# Patient Record
Sex: MALE | Race: WHITE | Employment: OTHER | URBAN - METROPOLITAN AREA
[De-identification: names, ages, dates, MRNs, and addresses within clinical notes are randomized per-mention and may not be internally consistent; named-entity substitution may affect disease eponyms.]

---

## 2017-08-10 ENCOUNTER — OP HISTORICAL/CONVERTED ENCOUNTER (OUTPATIENT)
Dept: OTHER | Age: 67
End: 2017-08-10

## 2018-05-29 ENCOUNTER — ED HISTORICAL/CONVERTED ENCOUNTER (OUTPATIENT)
Dept: OTHER | Age: 68
End: 2018-05-29

## 2018-08-16 ENCOUNTER — OFFICE VISIT (OUTPATIENT)
Dept: ENDOCRINOLOGY | Age: 68
End: 2018-08-16

## 2018-08-16 VITALS
TEMPERATURE: 98.7 F | OXYGEN SATURATION: 95 % | SYSTOLIC BLOOD PRESSURE: 139 MMHG | HEIGHT: 68 IN | HEART RATE: 56 BPM | BODY MASS INDEX: 34.57 KG/M2 | DIASTOLIC BLOOD PRESSURE: 69 MMHG | WEIGHT: 228.1 LBS | RESPIRATION RATE: 16 BRPM

## 2018-08-16 DIAGNOSIS — D35.02 ADRENAL ADENOMA, LEFT: Primary | ICD-10-CM

## 2018-08-16 RX ORDER — POLYETHYLENE GLYCOL 3350 17 G/17G
34 POWDER, FOR SOLUTION ORAL DAILY
COMMUNITY
End: 2020-06-20

## 2018-08-16 RX ORDER — FLUOXETINE HYDROCHLORIDE 40 MG/1
40 CAPSULE ORAL DAILY
COMMUNITY

## 2018-08-16 RX ORDER — PANTOPRAZOLE SODIUM 40 MG/1
40 TABLET, DELAYED RELEASE ORAL DAILY
COMMUNITY

## 2018-08-16 RX ORDER — BISMUTH SUBSALICYLATE 262 MG
1 TABLET,CHEWABLE ORAL DAILY
Status: ON HOLD | COMMUNITY
End: 2020-10-23

## 2018-08-16 RX ORDER — TRAZODONE HYDROCHLORIDE 100 MG/1
200 TABLET ORAL
COMMUNITY

## 2018-08-16 NOTE — PROGRESS NOTES
[facility]               Adam Crespo MD        [address] 78 444 81 66 Fax [phone]       Patient Information  Date:8/18/2018  Name : Lorraine Alvarez 77 y.o.     YOB: 1950         Referred by: Meggan Kelley DO       Chief Complaint   Patient presents with   Klondike Shaker New Patient     referred by Dr. Umair Garza for Adrenal problem       History of Present Illness: Lorraine Alvarez is a 77 y.o. male   He was referred for evaluation of left adrenal nodule with was found incidentally on CT scan. He also was found to have aortic aneurysm. He has hypertension which is controlled on medications, sleep apnea and uses CPAP machine  No hypokalemia, has palpitations intermittently but no spells of severe hypertension, chest pain, sweating and headaches. Has difficulty losing weight, gradually been gaining weight. Reports low libido    Complains of joint pain, sciatica, no chest pain, shortness of breath, blurred vision    Past Medical History:   Diagnosis Date    CAD (coronary artery disease)     COPD (chronic obstructive pulmonary disease) (HCC)     Hypertension     Lung nodule     DIONISIO (obstructive sleep apnea)     Sciatica      Past Surgical History:   Procedure Laterality Date    HX ANGIOPLASTY      HX CHOLECYSTECTOMY      HX COLONOSCOPY  02/2016    HX REFRACTIVE SURGERY  12/2016     Current Outpatient Prescriptions   Medication Sig    lisinopril (PRINIVIL, ZESTRIL) 40 mg tablet TAKE 1 TABLET BY MOUTH EVERY DAY    polyethylene glycol (MIRALAX) 17 gram packet Take 34 g by mouth daily.  FLUoxetine (PROZAC) 40 mg capsule Take  by mouth daily.  pantoprazole (PROTONIX) 40 mg tablet Take 40 mg by mouth daily.  traZODone (DESYREL) 100 mg tablet Take 100 mg by mouth nightly.  vitamin e (E GEMS) 1,000 unit capsule Take 1,000 Units by mouth daily.  multivitamin (ONE A DAY) tablet Take 1 Tab by mouth daily.     spironolactone (ALDACTONE) 25 mg tablet Take 1 Tab by mouth two (2) times a day.  pravastatin (PRAVACHOL) 40 mg tablet TAKE 1 TABLET BY MOUTH EVERY DAY    omeprazole (PRILOSEC) 10 mg capsule TAKE 2 CAPSULES BY MOUTH TWICE DAILY    omega-3 fatty acids-vitamin e (FISH OIL) 1,000 mg Cap Take 2 Caps by mouth two (2) times a day.  ASPIRIN CHILDRENS PO Take  by mouth.  temazepam (RESTORIL) 30 mg capsule Take 1 Cap by mouth nightly. No current facility-administered medications for this visit. Allergies   Allergen Reactions    Hydrochlorothiazide Other (comments)     kidney problem    Zestril [Lisinopril] Cough         Review of Systems:  All 10 systems reviewed and are negative other than mentioned in HPI    Physical Examination:  Blood pressure 139/69, pulse (!) 56, temperature 98.7 °F (37.1 °C), temperature source Oral, resp. rate 16, height 5' 8.25\" (1.734 m), weight 228 lb 1.6 oz (103.5 kg), SpO2 95 %. Body mass index is 34.43 kg/(m^2). - General: pleasant, no distress, good eye contact  - HEENT: no exopthalmos, no periorbital edema, no scleral/conjunctival injection, EOMI, no lid lag or stare  - Neck: supple, no thyromegaly, lymph nodes, or carotid bruits, no supraclavicular or dorsocervical fat pads  - Cardiovascular: regular, normal rate, normal S1 and S2, no murmurs  - Respiratory: clear to auscultation bilaterally  - Gastrointestinal: soft, nontender, nondistended,BS +  - Musculoskeletal: no proximal muscle weakness in upper or lower extremities  - Integumentary: no acanthosis nigricans, no abnormal abdominal striae, no rashes, no edema  - Neurological: Alert and oriented, no tremor  - Psychiatric: normal mood and affect    Data Reviewed:     Assessment/Plan:     1. Adrenal adenoma, left        Left adrenal adenoma incidentally found on CT scan.   3 cm per PCPs notes  Could not get the CT report     We will screen for 3 potential endocrine conditions that occur due to excess hormone production from the adrenal gland: hyperaldosteronism, Cushings syndrome, and pheochromocytoma. He is off spironolactone for the last 4 weeks    - will need a repeat CT scan in 6-12 months to document stability in size of adenoma, if no change at that time, no need for further repeat imaging. Excess hormone secretion may develop in 20% of patients with previously documented nonfunctional adrenal tumors  during follow up. Annual biochemical evaluation for hormone hypersecretion for upto to 5 yrs is recommended especially if the tumor size is > 3 cm. A routine follow up imaging is not required if non contrast CT attenuation value is < 10 HU,although a one time follow up scan in 6 - 12 month is reassuring. If the size is < 4 cm and the noncontrast attenuation is > 10 HU a repeat CT study in 3-6 months and then yearly for 2 years is recommended. There is no good evidence supporting continued radiological survelliance. About 25% - 30% of adenomas are lipid poor and have attenuation values > 10 HU. In that case contrast wash out can be used to evaluate the nature of the adrenal mass. Thank you for allowing me to participate in the care of this patient. Janna Simpson MD      Patient Instructions   Instructions for salivary cortisol test    1. Do not brush teeth or floss  before collecting specimen. 2. Do not eat or drink for 30 minutes prior to specimen collection. 3. 24 hours before collection - do not use any creams or lotion that contains steroids such as         hydrocortisone or use any steroid inhalers. These products may contaminate the absorbent. 4. Avoid activities that may cause gum bleeding before collection      Night 1- Put the absorbent pad under your tongue between 11p-midnight for four (4) minutes, label with name, date of birth, collection date and collection time.  Place pad in the freezer.     Night 5- Put the absorbent pad under your tongue between 11p-midnight for four (4) minutes, label with name, date of birth, collection date and collection time. Place pad in the freezer.      Absorbent pads are to remain frozen until you [facility] orders or our lab in the office. Follow-up Disposition:  Return in about 9 months (around 5/16/2019). Patient /caregiver verbalized understanding . Voice-recognition software was used to generate this report, which may result in some phonetic-based errors in the grammar and contents. Even though attempts were made to correct all the mistakes, some may have been missed and remained in the body of the report.

## 2018-08-16 NOTE — MR AVS SNAPSHOT
[address] [address] 
[phone] Patient: Heri Mckay MRN:  [id number] Visit Information Date & Time Provider Department Dept. Phone Encounter #  
 8/16/2018 11:00 AM Nilo Carcamo MD [facility] Diabetes & Endocrinology [phone] [id number] Follow-up Instructions Return in about 9 months (around 5/16/2019). Upcoming Health Maintenance Date Due Hepatitis C Screening 1950 DTaP/Tdap/Td series (1 - Tdap) 6/1/1969 ZOSTER VACCINE AGE 60> 4/1/2008 FOBT Q 1 YEAR AGE 50-75 1/10/2012 GLAUCOMA SCREENING Q2Y 6/1/2013 Pneumococcal 65+ Low/Medium Risk (1 of 2 - PCV13) 6/1/2013 Influenza Age 5 to Adult 8/1/2018 Allergies as of 8/16/2018  Review Complete On: 8/16/2018 By: Nilo Carcamo MD  
  
 Severity Noted Reaction Type Reactions Hydrochlorothiazide  08/09/2010    Other (comments)  
 kidney problem Zestril [Lisinopril]  08/09/2010    Cough Current Immunizations  Never Reviewed No immunizations on file. Not reviewed this visit You Were Diagnosed With   
  
 Codes Comments Adrenal adenoma, left    -  Primary ICD-10-CM: D35.02 
ICD-9-CM: 227.0 Vitals BP Pulse Temp Resp Height(growth percentile) Weight(growth percentile) 139/69 (BP 1 Location: Left arm, BP Patient Position: Sitting) (!) 56 98.7 °F (37.1 °C) (Oral) 16 5' 8.25\" (1.734 m) 228 lb 1.6 oz (103.5 kg) SpO2 BMI Smoking Status 95% 34.43 kg/m2 Current Every Day Smoker Vitals History BMI and BSA Data Body Mass Index Body Surface Area 34.43 kg/m 2 2.23 m 2 Preferred Pharmacy Pharmacy Name Phone [address] [facility] [address] Your Updated Medication List  
  
   
This list is accurate as of 8/16/18 12:00 PM.  Always use your most recent med list.  
  
  
  
  
 ASPIRIN CHILDRENS PO Take  by mouth. FISH OIL 1,000 mg Cap Generic drug:  omega-3 fatty acids-vitamin e Take 2 Caps by mouth two (2) times a day. FLUoxetine 40 mg capsule Commonly known as:  PROzac Take  by mouth daily. MIRALAX 17 gram packet Generic drug:  polyethylene glycol Take 34 g by mouth daily. multivitamin tablet Commonly known as:  ONE A DAY Take 1 Tab by mouth daily. omeprazole 10 mg capsule Commonly known as:  PRILOSEC  
TAKE 2 CAPSULES BY MOUTH TWICE DAILY pantoprazole 40 mg tablet Commonly known as:  PROTONIX Take 40 mg by mouth daily. pravastatin 40 mg tablet Commonly known as:  PRAVACHOL  
TAKE 1 TABLET BY MOUTH EVERY DAY  
  
 spironolactone 25 mg tablet Commonly known as:  ALDACTONE Take 1 Tab by mouth two (2) times a day. temazepam 30 mg capsule Commonly known as:  RESTORIL Take 1 Cap by mouth nightly. traZODone 100 mg tablet Commonly known as:  Chema Awkward Take 100 mg by mouth nightly. vitamin e 1,000 unit capsule Commonly known as:  E GEMS Take 1,000 Units by mouth daily. Follow-up Instructions Return in about 9 months (around 5/16/2019). Patient Instructions Instructions for salivary cortisol test 
 
1. Do not brush teeth or floss  before collecting specimen. 2. Do not eat or drink for 30 minutes prior to specimen collection. 3. 24 hours before collection - do not use any creams or lotion that contains steroids such as         hydrocortisone or use any steroid inhalers. These products may contaminate the absorbent. 4. Avoid activities that may cause gum bleeding before collection Night 1- Put the absorbent pad under your tongue between 11p-midnight for four (4) minutes, label with name, date of birth, collection date and collection time.  Place pad in the freezer. 
  
Night 5- Put the absorbent pad under your tongue between 11p-midnight for four (4) minutes, label with name, date of birth, collection date and collection time. Place pad in the freezer.  
  
Absorbent pads are to remain frozen until you can drop them off to [facility] with orders or our lab in the office. Introducing [facility]! [facility] introduces [employer] patient portal. Now you can access parts of your medical record, email your doctor's office, and request medication refills online. 1. In your internet browser, go to [email or web address] 2. Click on the First Time User? Click Here link in the Sign In box. You will see the New Member Sign Up page. 3. Enter your [employer] Access Code exactly as it appears below. You will not need to use this code after youve completed the sign-up process. If you do not sign up before the expiration date, you must request a new code. · [employer] Access Code: [email or web address] Expires: 11/14/2018 12:00 PM 
 
4. Enter the last four digits of your Social Security Number (xxxx) and Date of Birth (mm/dd/yyyy) as indicated and click Submit. You will be taken to the next sign-up page. 5. Create a [employer] ID. This will be your [employer] login ID and cannot be changed, so think of one that is secure and easy to remember. 6. Create a [employer] password. You can change your password at any time. 7. Enter your Password Reset Question and Answer. This can be used at a later time if you forget your password. 8. Enter your e-mail address. You will receive e-mail notification when new information is available in [address] 9. Click Sign Up. You can now view and download portions of your medical record. 10. Click the Download Summary menu link to download a portable copy of your medical information. If you have questions, please visit the Frequently Asked Questions section of the [employer] website. Remember, [employer] is NOT to be used for urgent needs. For medical emergencies, dial 911. Now available from your iPhone and Android! Please provide this summary of care documentation to your next provider. Your primary care clinician is listed as Jakob Alfonso. If you have any questions after today's visit, please call [phone].

## 2018-08-16 NOTE — PATIENT INSTRUCTIONS
Instructions for salivary cortisol test    1. Do not brush teeth or floss  before collecting specimen. 2. Do not eat or drink for 30 minutes prior to specimen collection. 3. 24 hours before collection - do not use any creams or lotion that contains steroids such as         hydrocortisone or use any steroid inhalers. These products may contaminate the absorbent. 4. Avoid activities that may cause gum bleeding before collection      Night 1- Put the absorbent pad under your tongue between 11p-midnight for four (4) minutes, label with name, date of birth, collection date and collection time. Place pad in the freezer.     Night 5- Put the absorbent pad under your tongue between 11p-midnight for four (4) minutes, label with name, date of birth, collection date and collection time. Place pad in the freezer.      Absorbent pads are to remain frozen until you can drop them off to [facility] with orders or our lab in the office.

## 2018-08-16 NOTE — LETTER
8/18/2018 5:47 PM 
 
Patient:  Brooke Loza YOB: 1950 Date of Visit: 8/16/2018 Dear Michael Chaparro, [address] [place], [place] [address]Katalina Curiel [address] [address] VIA Facsimile: [phone] 
 : Thank you for referring Mr. Hiral Ritchie to me for evaluation/treatment. Below are the relevant portions of my assessment and plan of care. If you have questions, please do not hesitate to call me. I look forward to following Mr. Chrissie Kang along with you. Sincerely, Denise Raza MD

## 2018-08-18 RX ORDER — LISINOPRIL 40 MG/1
TABLET ORAL
Qty: 90 TAB | Refills: 3 | COMMUNITY
Start: 2018-08-16

## 2018-08-24 ENCOUNTER — TELEPHONE (OUTPATIENT)
Dept: ENDOCRINOLOGY | Age: 68
End: 2018-08-24

## 2018-08-24 NOTE — TELEPHONE ENCOUNTER
----- Message from Bear Courtney MD sent at 8/23/2018  9:43 PM EDT -----  Tests are all normal except one level which is cortisol  Need late-night salivary cortisol ×2

## 2018-08-24 NOTE — TELEPHONE ENCOUNTER
Per Dr. Otto Matias, informed pt of result note, as noted above. Pt verbalized understanding and stated he has started the salivary cortisol test and will bring it in next week. No further questions or concerns at this time.

## 2018-09-06 ENCOUNTER — OP HISTORICAL/CONVERTED ENCOUNTER (OUTPATIENT)
Dept: OTHER | Age: 68
End: 2018-09-06

## 2018-09-27 ENCOUNTER — OP HISTORICAL/CONVERTED ENCOUNTER (OUTPATIENT)
Dept: OTHER | Age: 68
End: 2018-09-27

## 2019-05-15 ENCOUNTER — OP HISTORICAL/CONVERTED ENCOUNTER (OUTPATIENT)
Dept: OTHER | Age: 69
End: 2019-05-15

## 2019-12-02 ENCOUNTER — ED HISTORICAL/CONVERTED ENCOUNTER (OUTPATIENT)
Dept: OTHER | Age: 69
End: 2019-12-02

## 2020-06-18 ENCOUNTER — OFFICE VISIT (OUTPATIENT)
Dept: ENDOCRINOLOGY | Age: 70
End: 2020-06-18

## 2020-06-18 VITALS
DIASTOLIC BLOOD PRESSURE: 78 MMHG | RESPIRATION RATE: 18 BRPM | HEART RATE: 56 BPM | HEIGHT: 68 IN | SYSTOLIC BLOOD PRESSURE: 159 MMHG | OXYGEN SATURATION: 95 % | TEMPERATURE: 96.9 F | BODY MASS INDEX: 31.37 KG/M2 | WEIGHT: 207 LBS

## 2020-06-18 DIAGNOSIS — I10 ESSENTIAL HYPERTENSION: ICD-10-CM

## 2020-06-18 DIAGNOSIS — G47.33 OSA (OBSTRUCTIVE SLEEP APNEA): Primary | ICD-10-CM

## 2020-06-18 DIAGNOSIS — D35.02 ADRENAL ADENOMA, LEFT: ICD-10-CM

## 2020-06-18 RX ORDER — CLOPIDOGREL BISULFATE 75 MG/1
75 TABLET ORAL DAILY
COMMUNITY

## 2020-06-18 RX ORDER — DILTIAZEM HYDROCHLORIDE 180 MG/1
180 CAPSULE, COATED, EXTENDED RELEASE ORAL DAILY
COMMUNITY

## 2020-06-18 NOTE — PATIENT INSTRUCTIONS
Instructions for salivary cortisol test 
 
1. Do not brush teeth or floss  before collecting specimen. 2. Do not eat or drink for 30 minutes prior to specimen collection. 3. 24 hours before collection - do not use any creams or lotion that contains steroids such as         hydrocortisone or use any steroid inhalers. These products may contaminate the absorbent. 4. Avoid activities that may cause gum bleeding before collection Night 1- Put the absorbent pad under your tongue between 11p-midnight for four (4) minutes, label with name, date of birth, collection date and collection time. Place pad in the freezer. 
  
Night 5- Put the absorbent pad under your tongue between 11p-midnight for four (4) minutes, label with name, date of birth, collection date and collection time. Place pad in the freezer.  
  
Absorbent pads are to remain frozen until you can drop them off to [facility] with orders or our lab in the office.  
 
 
 
 
Blood test in the morning before 9 AM

## 2020-06-18 NOTE — PROGRESS NOTES
Jacob Mays MD      Patient Information  Date:6/18/2020  Name : Oracio Wise 65 y.o.     YOB: 1950         Referred by: Lee Hall DO       Chief Complaint   Patient presents with    Adrenal Problem     LV 2018       History of Present Illness: Oracio Wise is a 65 y.o. male   He was initially referred for evaluation of left adrenal nodule with was found incidentally on CT scan. He was last seen in 2018, had some labs, did not complete the work-up    He has hypertension which is controlled on medications, sleep apnea and uses CPAP machine  No hypokalemia, has palpitations intermittently but no spells of severe hypertension, chest pain, sweating and headaches. Joint pain      No chest pain, shortness of breath, fever, cough    Past Medical History:   Diagnosis Date    CAD (coronary artery disease)     COPD [facility]chronic obstructive pulmonary disease) ([facility] [address]     Hypertension     Lung nodule     DIONISIO (obstructive sleep apnea)     Sciatica      Past Surgical History:   Procedure Laterality Date    HX ANGIOPLASTY      HX BACK SURGERY  02/19/2020    lower back    HX CHOLECYSTECTOMY      HX COLONOSCOPY  02/2016    HX REFRACTIVE SURGERY  12/2016     Current Outpatient Medications   Medication Sig    lisinopril (PRINIVIL, ZESTRIL) 40 mg tablet TAKE 1 TABLET BY MOUTH EVERY DAY    polyethylene glycol (MIRALAX) 17 gram packet Take 34 g by mouth daily.  FLUoxetine (PROZAC) 40 mg capsule Take  by mouth daily.  pantoprazole (PROTONIX) 40 mg tablet Take 40 mg by mouth daily.  traZODone (DESYREL) 100 mg tablet Take 100 mg by mouth nightly.  vitamin e (E GEMS) 1,000 unit capsule Take 1,000 Units by mouth daily.  multivitamin (ONE A DAY) tablet Take 1 Tab by mouth daily.  temazepam (RESTORIL) 30 mg capsule Take 1 Cap by mouth nightly.  spironolactone (ALDACTONE) 25 mg tablet Take 1 Tab by mouth two (2) times a day.     pravastatin (PRAVACHOL) 40 mg tablet TAKE 1 TABLET BY MOUTH EVERY DAY    omeprazole (PRILOSEC) 10 mg capsule TAKE 2 CAPSULES BY MOUTH TWICE DAILY    omega-3 fatty acids-vitamin e (FISH OIL) 1,000 mg Cap Take 2 Caps by mouth two (2) times a day.  ASPIRIN CHILDRENS PO Take  by mouth. No current facility-administered medications for this visit. Allergies   Allergen Reactions    Hydrochlorothiazide Other (comments)     kidney problem    Zestril [Lisinopril] Cough         Review of Systems: Per HPI    Physical Examination:  Blood pressure 173/83, pulse (!) 56, temperature (!) 96.6 °F (35.9 °C), temperature source Oral, resp. rate 18, height 5' 8.25\" (1.734 m), weight 207 lb (93.9 kg), SpO2 95 %. Body mass index is 31.24 kg/m². - General: pleasant, no distress, good eye contact  - HEENT: no exophthalmos, no periorbital edema, EOMI  - Neck: No visible thyromegaly  - RS: Normal respiratory effort  - Musculoskeletal: no tremors  - Neurological: alert and oriented  - Psychiatric: normal mood and affect  - Skin: Normal color  -     Data Reviewed:     Assessment/Plan:     1. Adrenal adenoma, left        Left adrenal adenoma incidentally found on CT scan-3 cm in 2018 2018 plasma metanephrines, aldosterone, renin normal, cortisol was elevated, did not complete late-night salivary cortisol  No excess weight gain    Labs soon  Discussed need for follow-up CT abdomen    Excess hormone secretion may develop in 20% of patients with previously documented nonfunctional adrenal tumors  during follow up. Annual biochemical evaluation for hormone hypersecretion for upto to 5 yrs is recommended especially if the tumor size is > 3 cm. Hypertension: Not at goal        Obstructive sleep apnea: On CPAP          Thank you for allowing me to participate in the care of this patient. Chuckie Gaines MD      There are no Patient Instructions on file for this visit.           Patient /caregiver verbalized understanding [employer] Voice-recognition software was used to generate this report, which may result in some phonetic-based errors in the grammar and contents. Even though attempts were made to correct all the mistakes, some may have been missed and remained in the body of the report.
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
## 2020-06-18 NOTE — LETTER
6/20/20 Patient: Rena Juarez YOB: 1950 Date of Visit: 6/18/2020 Natasha Pedersen, [address] [address] VIA Facsimile: [phone] Dear Natasha Pedersen, DO, Thank you for referring Mr. Aidee Larson to [address] [place] [address] for evaluation. My notes for this consultation are attached. If you have questions, please do not hesitate to call me. I look forward to following your patient along with you. Sincerely, Janell Estes MD

## 2020-06-20 PROBLEM — G47.33 OSA (OBSTRUCTIVE SLEEP APNEA): Status: ACTIVE | Noted: 2020-06-20

## 2020-07-16 NOTE — PROGRESS NOTES
Adrenal hormones are all normal except 1 which is very minimally off which is the saliva test.  1 of the saliva test is normal 1 is abnormal, so we need to do a different kind of blood test to rule out    Take Dexamethasone 1 tablet at 11pm the night before and go to the lab for blood  draw between 7:30-8:30 the next morning. It  is very important for the blood to be drawn before 8:30 AM    A.m. cortisol, dexamethasone levels

## 2020-07-17 ENCOUNTER — TELEPHONE (OUTPATIENT)
Dept: ENDOCRINOLOGY | Age: 70
End: 2020-07-17

## 2020-07-17 DIAGNOSIS — D35.02 ADRENAL ADENOMA, LEFT: Primary | ICD-10-CM

## 2020-07-17 DIAGNOSIS — D35.02 ADRENAL ADENOMA, LEFT: ICD-10-CM

## 2020-07-17 RX ORDER — DEXAMETHASONE 1 MG/1
TABLET ORAL
Qty: 1 TAB | Refills: 0 | Status: ON HOLD | OUTPATIENT
Start: 2020-07-17 | End: 2020-10-23

## 2020-07-17 NOTE — TELEPHONE ENCOUNTER
----- Message from Raimundo Menjivar MD sent at 7/16/2020  3:16 PM EDT -----  Adrenal hormones are all normal except 1 which is very minimally off which is the saliva test.  1 of the saliva test is normal 1 is abnormal, so we need to do a different kind of blood test to rule out    Take Dexamethasone 1 tablet at 11pm the night before and go to the lab for blood  draw between 7:30-8:30 the next morning. It  is very important for the blood to be drawn before 8:30 AM    A.m. cortisol, dexamethasone levels

## 2020-07-17 NOTE — TELEPHONE ENCOUNTER
Per Dr. Geraldine Olsen, informed pt of result note, as noted above. Pt verbalized understanding with no further questions or concerns at this time. Order placed for pt per verbal order with read back from Dr. Geraldine Olsen 07/17/20    Lab slips mailed with instructions.

## 2020-07-30 LAB
CORTIS AM PEAK SERPL-MCNC: 1.6 UG/DL (ref 6.2–19.4)
DEXAMETHASONE SERPL-MCNC: 533 NG/DL

## 2020-08-03 ENCOUNTER — TELEPHONE (OUTPATIENT)
Dept: ENDOCRINOLOGY | Age: 70
End: 2020-08-03

## 2020-08-03 NOTE — TELEPHONE ENCOUNTER
----- Message from Jaquan Kumar MD sent at 7/31/2020  9:20 AM EDT -----  No evidence of excess secretion from the nodule    We monitor next year again

## 2020-08-03 NOTE — TELEPHONE ENCOUNTER
Per Dr. Lori Solano, informed pt of result note, as noted above. Pt verbalized understanding with no further questions or concerns at this time.

## 2020-10-15 ENCOUNTER — HOSPITAL ENCOUNTER (OUTPATIENT)
Dept: GENERAL RADIOLOGY | Age: 70
Discharge: HOME | End: 2020-10-15
Attending: ORTHOPAEDIC SURGERY
Payer: MEDICARE

## 2020-10-15 ENCOUNTER — HOSPITAL ENCOUNTER (OUTPATIENT)
Dept: PREADMISSION TESTING | Age: 70
Discharge: HOME | End: 2020-10-15
Payer: MEDICARE

## 2020-10-15 VITALS
HEIGHT: 67 IN | WEIGHT: 199.08 LBS | RESPIRATION RATE: 16 BRPM | OXYGEN SATURATION: 96 % | DIASTOLIC BLOOD PRESSURE: 77 MMHG | HEART RATE: 51 BPM | BODY MASS INDEX: 31.25 KG/M2 | SYSTOLIC BLOOD PRESSURE: 166 MMHG | TEMPERATURE: 98 F

## 2020-10-15 LAB
ABO + RH BLD: NORMAL
ANION GAP SERPL CALC-SCNC: 2 MMOL/L (ref 5–15)
APPEARANCE UR: ABNORMAL
APTT PPP: 29.3 SEC (ref 23–35.7)
ATRIAL RATE: 54 BPM
BACTERIA URNS QL MICRO: NEGATIVE /HPF
BILIRUB UR QL: NEGATIVE
BLOOD GROUP ANTIBODIES SERPL: NEGATIVE
BUN SERPL-MCNC: 12 MG/DL (ref 6–20)
BUN/CREAT SERPL: 11 (ref 12–20)
CA-I BLD-MCNC: 9.8 MG/DL (ref 8.5–10.1)
CALCULATED P AXIS, ECG09: 77 DEGREES
CALCULATED R AXIS, ECG10: 40 DEGREES
CALCULATED T AXIS, ECG11: 32 DEGREES
CHLORIDE SERPL-SCNC: 105 MMOL/L (ref 97–108)
CO2 SERPL-SCNC: 33 MMOL/L (ref 21–32)
COLOR UR: ABNORMAL
CREAT SERPL-MCNC: 1.06 MG/DL (ref 0.7–1.3)
DIAGNOSIS, 93000: NORMAL
ERYTHROCYTE [DISTWIDTH] IN BLOOD BY AUTOMATED COUNT: 14 % (ref 11.5–14.5)
GLUCOSE SERPL-MCNC: 96 MG/DL (ref 65–100)
GLUCOSE UR STRIP.AUTO-MCNC: NEGATIVE MG/DL
HCT VFR BLD AUTO: 45.4 % (ref 36.6–50.3)
HGB BLD-MCNC: 15.3 G/DL (ref 12.1–17)
HGB UR QL STRIP: NEGATIVE
INR PPP: 0.9 (ref 0.9–1.1)
KETONES UR QL STRIP.AUTO: NEGATIVE MG/DL
LEUKOCYTE ESTERASE UR QL STRIP.AUTO: NEGATIVE
MCH RBC QN AUTO: 34.5 PG (ref 26–34)
MCHC RBC AUTO-ENTMCNC: 33.7 G/DL (ref 30–36.5)
MCV RBC AUTO: 102.3 FL (ref 80–99)
MUCOUS THREADS URNS QL MICRO: ABNORMAL /LPF
NITRITE UR QL STRIP.AUTO: NEGATIVE
P-R INTERVAL, ECG05: 328 MS
PH UR STRIP: 5 [PH] (ref 5–8)
PLATELET # BLD AUTO: 243 K/UL (ref 150–400)
PMV BLD AUTO: 9.9 FL (ref 8.9–12.9)
POTASSIUM SERPL-SCNC: 4 MMOL/L (ref 3.5–5.1)
PROT UR STRIP-MCNC: NEGATIVE MG/DL
PROTHROMBIN TIME: 12.5 SEC (ref 11.9–14.7)
Q-T INTERVAL, ECG07: 446 MS
QRS DURATION, ECG06: 88 MS
QTC CALCULATION (BEZET), ECG08: 422 MS
RBC # BLD AUTO: 4.44 M/UL (ref 4.1–5.7)
RBC #/AREA URNS HPF: ABNORMAL /HPF (ref 0–5)
SODIUM SERPL-SCNC: 140 MMOL/L (ref 136–145)
SP GR UR REFRACTOMETRY: 1.03 (ref 1–1.03)
SPECIMEN EXP DATE BLD: NORMAL
THERAPEUTIC RANGE,PTTT: NORMAL SEC (ref 68–109)
UROBILINOGEN UR QL STRIP.AUTO: 2 EU/DL (ref 0.1–1)
VENTRICULAR RATE, ECG03: 54 BPM
WBC # BLD AUTO: 8.4 K/UL (ref 4.1–11.1)
WBC URNS QL MICRO: ABNORMAL /HPF (ref 0–4)

## 2020-10-15 PROCEDURE — 36415 COLL VENOUS BLD VENIPUNCTURE: CPT

## 2020-10-15 PROCEDURE — 71046 X-RAY EXAM CHEST 2 VIEWS: CPT

## 2020-10-15 PROCEDURE — 85027 COMPLETE CBC AUTOMATED: CPT

## 2020-10-15 PROCEDURE — 93005 ELECTROCARDIOGRAM TRACING: CPT

## 2020-10-15 PROCEDURE — 85610 PROTHROMBIN TIME: CPT

## 2020-10-15 PROCEDURE — 87086 URINE CULTURE/COLONY COUNT: CPT

## 2020-10-15 PROCEDURE — 85730 THROMBOPLASTIN TIME PARTIAL: CPT

## 2020-10-15 PROCEDURE — 81001 URINALYSIS AUTO W/SCOPE: CPT

## 2020-10-15 PROCEDURE — 80048 BASIC METABOLIC PNL TOTAL CA: CPT

## 2020-10-15 PROCEDURE — 86900 BLOOD TYPING SEROLOGIC ABO: CPT

## 2020-10-15 NOTE — PERIOP NOTES
36 Dr. Nunn Left office called, spoke with Nilay Kumar, surgical scheduler, made aware of urinalysis , urobilinogen 2.0, and urine culture not ready as yet and also re: previous note of patient taking aspirin and plavix daily. Karl Haas communicated with Dr. Shin Green, stated patient can continue aspirin as usual and is to hold plavix for 7 days prior to surgery and she will call patient to instruct.

## 2020-10-15 NOTE — PERIOP NOTES
1000 Dr. Camacho [place] office called, with permission given by patient during PAT visit, requested most recent office note, ekg and any cardiac test results to be faxed to PAT dept. 12 Dr. Keenan Cisneros office, left a message for Vicente Bo, surgical scheduler re: pt stated takes plavix 75mg and 1/2 of 81mg aspirin daily, to make Dr. Kayce Mensah aware and call patient with any instructions. 18 Dr. Toma Ahumada called, made aware of patient's history, ekg done today in PAT , ekg done 2/6/2020 by Dr. Katy Benítez, stress test report done 4/1/2019 by Dr. Katy Benítez and office note by Dr. Katy Benítez done 8/25/2020. No new orders given, stated ok to proceed with surgery as planned.

## 2020-10-16 LAB
BACTERIA SPEC CULT: ABNORMAL
BACTERIA SPEC CULT: ABNORMAL
BACTERIA SPEC CULT: NORMAL
SPECIAL REQUESTS,SREQ: ABNORMAL
SPECIAL REQUESTS,SREQ: NORMAL

## 2020-10-19 ENCOUNTER — HOSPITAL ENCOUNTER (OUTPATIENT)
Dept: PREADMISSION TESTING | Age: 70
Discharge: HOME | End: 2020-10-19
Payer: MEDICARE

## 2020-10-19 LAB — SARS-COV-2, COV2: NORMAL

## 2020-10-19 PROCEDURE — 87635 SARS-COV-2 COVID-19 AMP PRB: CPT

## 2020-10-21 LAB — SARS-COV-2, COV2NT: NOT DETECTED

## 2020-10-21 RX ORDER — CEFAZOLIN SODIUM IN 0.9 % NACL 2 G/100 ML
2 PLASTIC BAG, INJECTION (ML) INTRAVENOUS ONCE
Status: CANCELLED | OUTPATIENT
Start: 2020-10-22 | End: 2020-10-22

## 2020-10-23 ENCOUNTER — APPOINTMENT (OUTPATIENT)
Dept: GENERAL RADIOLOGY | Age: 70
DRG: 460 | End: 2020-10-23
Attending: ORTHOPAEDIC SURGERY
Payer: MEDICARE

## 2020-10-23 ENCOUNTER — ANESTHESIA (OUTPATIENT)
Dept: SURGERY | Age: 70
DRG: 460 | End: 2020-10-23
Payer: MEDICARE

## 2020-10-23 ENCOUNTER — HOSPITAL ENCOUNTER (INPATIENT)
Age: 70
LOS: 1 days | Discharge: HOME | DRG: 460 | End: 2020-10-24
Attending: ORTHOPAEDIC SURGERY | Admitting: ORTHOPAEDIC SURGERY
Payer: MEDICARE

## 2020-10-23 ENCOUNTER — ANESTHESIA EVENT (OUTPATIENT)
Dept: SURGERY | Age: 70
DRG: 460 | End: 2020-10-23
Payer: MEDICARE

## 2020-10-23 DIAGNOSIS — M54.16 LUMBAR RADICULOPATHY: Primary | ICD-10-CM

## 2020-10-23 PROBLEM — M71.38 SYNOVIAL CYST OF LUMBAR FACET JOINT: Status: ACTIVE | Noted: 2020-10-23

## 2020-10-23 PROBLEM — M43.17 ACQUIRED SPONDYLOLISTHESIS OF LUMBOSACRAL REGION: Status: ACTIVE | Noted: 2020-10-23

## 2020-10-23 LAB
GLUCOSE BLD STRIP.AUTO-MCNC: 186 MG/DL (ref 65–100)
GLUCOSE BLD STRIP.AUTO-MCNC: 88 MG/DL (ref 65–100)
PERFORMED BY, TECHID: ABNORMAL
PERFORMED BY, TECHID: NORMAL

## 2020-10-23 PROCEDURE — 76060000037 HC ANESTHESIA 3 TO 3.5 HR: Performed by: ORTHOPAEDIC SURGERY

## 2020-10-23 PROCEDURE — 76000 FLUOROSCOPY <1 HR PHYS/QHP: CPT

## 2020-10-23 PROCEDURE — 0QH004Z INSERTION OF INTERNAL FIXATION DEVICE INTO LUMBAR VERTEBRA, OPEN APPROACH: ICD-10-PCS | Performed by: ORTHOPAEDIC SURGERY

## 2020-10-23 PROCEDURE — 36415 COLL VENOUS BLD VENIPUNCTURE: CPT

## 2020-10-23 PROCEDURE — 77010033678 HC OXYGEN DAILY

## 2020-10-23 PROCEDURE — 74011250636 HC RX REV CODE- 250/636: Performed by: NURSE ANESTHETIST, CERTIFIED REGISTERED

## 2020-10-23 PROCEDURE — 77030038156 HC CRD BPLR DISP CARF -A: Performed by: ORTHOPAEDIC SURGERY

## 2020-10-23 PROCEDURE — C1713 ANCHOR/SCREW BN/BN,TIS/BN: HCPCS | Performed by: ORTHOPAEDIC SURGERY

## 2020-10-23 PROCEDURE — 0SG00AJ FUSION OF LUMBAR VERTEBRAL JOINT WITH INTERBODY FUSION DEVICE, POSTERIOR APPROACH, ANTERIOR COLUMN, OPEN APPROACH: ICD-10-PCS | Performed by: ORTHOPAEDIC SURGERY

## 2020-10-23 PROCEDURE — 77030041699: Performed by: ORTHOPAEDIC SURGERY

## 2020-10-23 PROCEDURE — 77030039464 HC TU IRR ENDO DISP MSNX -B: Performed by: ORTHOPAEDIC SURGERY

## 2020-10-23 PROCEDURE — 77030031139 HC SUT VCRL2 J&J -A: Performed by: ORTHOPAEDIC SURGERY

## 2020-10-23 PROCEDURE — 82962 GLUCOSE BLOOD TEST: CPT

## 2020-10-23 PROCEDURE — 77030011265 HC ELECTRD BLD HEX COVD -A: Performed by: ORTHOPAEDIC SURGERY

## 2020-10-23 PROCEDURE — 94762 N-INVAS EAR/PLS OXIMTRY CONT: CPT

## 2020-10-23 PROCEDURE — 77030018673: Performed by: ORTHOPAEDIC SURGERY

## 2020-10-23 PROCEDURE — 76010000173 HC OR TIME 3 TO 3.5 HR INTENSV-TIER 1: Performed by: ORTHOPAEDIC SURGERY

## 2020-10-23 PROCEDURE — 76210000016 HC OR PH I REC 1 TO 1.5 HR: Performed by: ORTHOPAEDIC SURGERY

## 2020-10-23 PROCEDURE — 77030028271 HC SRGFL HEMSTAT MTRX KT J&J -C: Performed by: ORTHOPAEDIC SURGERY

## 2020-10-23 PROCEDURE — 77030034475 HC MISC IMPL SPN: Performed by: ORTHOPAEDIC SURGERY

## 2020-10-23 PROCEDURE — 00NY0ZZ RELEASE LUMBAR SPINAL CORD, OPEN APPROACH: ICD-10-PCS | Performed by: ORTHOPAEDIC SURGERY

## 2020-10-23 PROCEDURE — 77030041703 HC SLV COMPR DVT ARJO -B: Performed by: ORTHOPAEDIC SURGERY

## 2020-10-23 PROCEDURE — 74011250636 HC RX REV CODE- 250/636: Performed by: ORTHOPAEDIC SURGERY

## 2020-10-23 PROCEDURE — 74011000250 HC RX REV CODE- 250: Performed by: NURSE ANESTHETIST, CERTIFIED REGISTERED

## 2020-10-23 PROCEDURE — 01NB0ZZ RELEASE LUMBAR NERVE, OPEN APPROACH: ICD-10-PCS | Performed by: ORTHOPAEDIC SURGERY

## 2020-10-23 PROCEDURE — 72100 X-RAY EXAM L-S SPINE 2/3 VWS: CPT

## 2020-10-23 PROCEDURE — 77030039461 HC BLD SURG BN MSNX -E: Performed by: ORTHOPAEDIC SURGERY

## 2020-10-23 PROCEDURE — 74011250637 HC RX REV CODE- 250/637: Performed by: ORTHOPAEDIC SURGERY

## 2020-10-23 PROCEDURE — 77030011264 HC ELECTRD BLD EXT COVD -A: Performed by: ORTHOPAEDIC SURGERY

## 2020-10-23 PROCEDURE — 74011250636 HC RX REV CODE- 250/636: Performed by: ANESTHESIOLOGY

## 2020-10-23 PROCEDURE — 77030005515 HC CATH URETH FOL14 BARD -B: Performed by: ORTHOPAEDIC SURGERY

## 2020-10-23 PROCEDURE — 0SB20ZZ EXCISION OF LUMBAR VERTEBRAL DISC, OPEN APPROACH: ICD-10-PCS | Performed by: ORTHOPAEDIC SURGERY

## 2020-10-23 PROCEDURE — 74011000250 HC RX REV CODE- 250: Performed by: ORTHOPAEDIC SURGERY

## 2020-10-23 PROCEDURE — 2709999900 HC NON-CHARGEABLE SUPPLY: Performed by: ORTHOPAEDIC SURGERY

## 2020-10-23 PROCEDURE — 74011250636 HC RX REV CODE- 250/636

## 2020-10-23 PROCEDURE — 77030034479 HC ADH SKN CLSR PRINEO J&J -B: Performed by: ORTHOPAEDIC SURGERY

## 2020-10-23 PROCEDURE — 77030008480 HC STYL W/CAP J&J -C: Performed by: ORTHOPAEDIC SURGERY

## 2020-10-23 PROCEDURE — 88304 TISSUE EXAM BY PATHOLOGIST: CPT

## 2020-10-23 PROCEDURE — 65270000029 HC RM PRIVATE

## 2020-10-23 DEVICE — GRAFT BNE SUB 5ML HCT P IN CORTICOCANCELLOUS CELLULAR BNE: Type: IMPLANTABLE DEVICE | Site: SPINE LUMBAR | Status: FUNCTIONAL

## 2020-10-23 DEVICE — SCREW SPNL L45MM DIA7MM TI POLYAX EXT TAB FOR MINIMALLY: Type: IMPLANTABLE DEVICE | Site: SPINE LUMBAR | Status: FUNCTIONAL

## 2020-10-23 DEVICE — SCREW SPNL L45MM DIA6MM TI POLYAX EXT TAB FOR MINIMALLY: Type: IMPLANTABLE DEVICE | Site: SPINE LUMBAR | Status: FUNCTIONAL

## 2020-10-23 DEVICE — ROD SPNL L45MM POST PEDCL TI LORDOSED VIPER 2: Type: IMPLANTABLE DEVICE | Site: SPINE LUMBAR | Status: FUNCTIONAL

## 2020-10-23 DEVICE — SET SCR SPNL TI SGL INNR FOR VIPER 2 MINIMALLY INVASIVE: Type: IMPLANTABLE DEVICE | Site: SPINE LUMBAR | Status: FUNCTIONAL

## 2020-10-23 RX ORDER — OXYCODONE HYDROCHLORIDE 5 MG/1
5 TABLET ORAL
Status: DISCONTINUED | OUTPATIENT
Start: 2020-10-23 | End: 2020-10-24 | Stop reason: HOSPADM

## 2020-10-23 RX ORDER — PROPOFOL 10 MG/ML
INJECTION, EMULSION INTRAVENOUS AS NEEDED
Status: DISCONTINUED | OUTPATIENT
Start: 2020-10-23 | End: 2020-10-23 | Stop reason: HOSPADM

## 2020-10-23 RX ORDER — FACIAL-BODY WIPES
10 EACH TOPICAL DAILY PRN
Status: DISCONTINUED | OUTPATIENT
Start: 2020-10-25 | End: 2020-10-24 | Stop reason: HOSPADM

## 2020-10-23 RX ORDER — NEOSTIGMINE METHYLSULFATE 5 MG/5 ML
SYRINGE (ML) INTRAVENOUS AS NEEDED
Status: DISCONTINUED | OUTPATIENT
Start: 2020-10-23 | End: 2020-10-23 | Stop reason: HOSPADM

## 2020-10-23 RX ORDER — BUPIVACAINE HYDROCHLORIDE 2.5 MG/ML
INJECTION, SOLUTION EPIDURAL; INFILTRATION; INTRACAUDAL AS NEEDED
Status: DISCONTINUED | OUTPATIENT
Start: 2020-10-23 | End: 2020-10-23 | Stop reason: HOSPADM

## 2020-10-23 RX ORDER — OXYCODONE AND ACETAMINOPHEN 5; 325 MG/1; MG/1
2 TABLET ORAL AS NEEDED
Status: DISCONTINUED | OUTPATIENT
Start: 2020-10-23 | End: 2020-10-23 | Stop reason: HOSPADM

## 2020-10-23 RX ORDER — DIAZEPAM 5 MG/1
5 TABLET ORAL
Status: CANCELLED | OUTPATIENT
Start: 2020-10-23

## 2020-10-23 RX ORDER — CLOPIDOGREL BISULFATE 75 MG/1
75 TABLET ORAL DAILY
Status: DISCONTINUED | OUTPATIENT
Start: 2020-10-23 | End: 2020-10-24 | Stop reason: HOSPADM

## 2020-10-23 RX ORDER — ONDANSETRON 2 MG/ML
4 INJECTION INTRAMUSCULAR; INTRAVENOUS AS NEEDED
Status: DISCONTINUED | OUTPATIENT
Start: 2020-10-23 | End: 2020-10-23 | Stop reason: HOSPADM

## 2020-10-23 RX ORDER — DEXAMETHASONE SODIUM PHOSPHATE 4 MG/ML
INJECTION, SOLUTION INTRA-ARTICULAR; INTRALESIONAL; INTRAMUSCULAR; INTRAVENOUS; SOFT TISSUE AS NEEDED
Status: DISCONTINUED | OUTPATIENT
Start: 2020-10-23 | End: 2020-10-23 | Stop reason: HOSPADM

## 2020-10-23 RX ORDER — ROCURONIUM BROMIDE 10 MG/ML
INJECTION, SOLUTION INTRAVENOUS AS NEEDED
Status: DISCONTINUED | OUTPATIENT
Start: 2020-10-23 | End: 2020-10-23 | Stop reason: HOSPADM

## 2020-10-23 RX ORDER — GUAIFENESIN 100 MG/5ML
40.5 LIQUID (ML) ORAL DAILY
Status: DISCONTINUED | OUTPATIENT
Start: 2020-10-23 | End: 2020-10-24 | Stop reason: HOSPADM

## 2020-10-23 RX ORDER — B-COMPLEX WITH VITAMIN C
1 TABLET ORAL DAILY
COMMUNITY

## 2020-10-23 RX ORDER — AMOXICILLIN 250 MG
1 CAPSULE ORAL 2 TIMES DAILY
Status: DISCONTINUED | OUTPATIENT
Start: 2020-10-23 | End: 2020-10-24 | Stop reason: HOSPADM

## 2020-10-23 RX ORDER — ONDANSETRON 4 MG/1
4 TABLET, ORALLY DISINTEGRATING ORAL
Status: DISCONTINUED | OUTPATIENT
Start: 2020-10-23 | End: 2020-10-24 | Stop reason: HOSPADM

## 2020-10-23 RX ORDER — SODIUM CHLORIDE 0.9 % (FLUSH) 0.9 %
5-40 SYRINGE (ML) INJECTION AS NEEDED
Status: DISCONTINUED | OUTPATIENT
Start: 2020-10-23 | End: 2020-10-24 | Stop reason: HOSPADM

## 2020-10-23 RX ORDER — ONDANSETRON 2 MG/ML
INJECTION INTRAMUSCULAR; INTRAVENOUS AS NEEDED
Status: DISCONTINUED | OUTPATIENT
Start: 2020-10-23 | End: 2020-10-23 | Stop reason: HOSPADM

## 2020-10-23 RX ORDER — CEFAZOLIN SODIUM 2 G/100ML
2 INJECTION, SOLUTION INTRAVENOUS ONCE
Status: COMPLETED | OUTPATIENT
Start: 2020-10-23 | End: 2020-10-23

## 2020-10-23 RX ORDER — NABUMETONE 750 MG/1
750 TABLET, FILM COATED ORAL 2 TIMES DAILY
COMMUNITY

## 2020-10-23 RX ORDER — POLYETHYLENE GLYCOL 3350 17 G/17G
17 POWDER, FOR SOLUTION ORAL DAILY
Status: DISCONTINUED | OUTPATIENT
Start: 2020-10-24 | End: 2020-10-24 | Stop reason: HOSPADM

## 2020-10-23 RX ORDER — SODIUM CHLORIDE, SODIUM LACTATE, POTASSIUM CHLORIDE, CALCIUM CHLORIDE 600; 310; 30; 20 MG/100ML; MG/100ML; MG/100ML; MG/100ML
INJECTION, SOLUTION INTRAVENOUS
Status: DISCONTINUED | OUTPATIENT
Start: 2020-10-23 | End: 2020-10-23

## 2020-10-23 RX ORDER — EPHEDRINE SULFATE/0.9% NACL/PF 50 MG/5 ML
SYRINGE (ML) INTRAVENOUS AS NEEDED
Status: DISCONTINUED | OUTPATIENT
Start: 2020-10-23 | End: 2020-10-23 | Stop reason: HOSPADM

## 2020-10-23 RX ORDER — SODIUM CHLORIDE 0.9 % (FLUSH) 0.9 %
5-40 SYRINGE (ML) INJECTION EVERY 8 HOURS
Status: DISCONTINUED | OUTPATIENT
Start: 2020-10-23 | End: 2020-10-24 | Stop reason: HOSPADM

## 2020-10-23 RX ORDER — SODIUM CHLORIDE 0.9 % (FLUSH) 0.9 %
5-40 SYRINGE (ML) INJECTION AS NEEDED
Status: DISCONTINUED | OUTPATIENT
Start: 2020-10-23 | End: 2020-10-23 | Stop reason: HOSPADM

## 2020-10-23 RX ORDER — FLUOXETINE HYDROCHLORIDE 20 MG/1
40 CAPSULE ORAL DAILY
Status: DISCONTINUED | OUTPATIENT
Start: 2020-10-23 | End: 2020-10-24 | Stop reason: HOSPADM

## 2020-10-23 RX ORDER — FENTANYL CITRATE 50 UG/ML
INJECTION, SOLUTION INTRAMUSCULAR; INTRAVENOUS AS NEEDED
Status: DISCONTINUED | OUTPATIENT
Start: 2020-10-23 | End: 2020-10-23 | Stop reason: HOSPADM

## 2020-10-23 RX ORDER — ACETAMINOPHEN 325 MG/1
650 TABLET ORAL EVERY 6 HOURS
Status: DISCONTINUED | OUTPATIENT
Start: 2020-10-23 | End: 2020-10-24 | Stop reason: HOSPADM

## 2020-10-23 RX ORDER — PANTOPRAZOLE SODIUM 40 MG/1
40 TABLET, DELAYED RELEASE ORAL DAILY
Status: DISCONTINUED | OUTPATIENT
Start: 2020-10-23 | End: 2020-10-24 | Stop reason: HOSPADM

## 2020-10-23 RX ORDER — NALOXONE HYDROCHLORIDE 0.4 MG/ML
0.4 INJECTION, SOLUTION INTRAMUSCULAR; INTRAVENOUS; SUBCUTANEOUS AS NEEDED
Status: DISCONTINUED | OUTPATIENT
Start: 2020-10-23 | End: 2020-10-24 | Stop reason: HOSPADM

## 2020-10-23 RX ORDER — DILTIAZEM HYDROCHLORIDE 180 MG/1
180 CAPSULE, EXTENDED RELEASE ORAL DAILY
Status: DISCONTINUED | OUTPATIENT
Start: 2020-10-23 | End: 2020-10-24 | Stop reason: HOSPADM

## 2020-10-23 RX ORDER — VITAMIN E CAP 100 UNIT 100 UNIT
200 CAP ORAL DAILY
Status: DISCONTINUED | OUTPATIENT
Start: 2020-10-24 | End: 2020-10-24 | Stop reason: HOSPADM

## 2020-10-23 RX ORDER — B-COMPLEX WITH VITAMIN C
1 TABLET ORAL DAILY
Status: DISCONTINUED | OUTPATIENT
Start: 2020-10-23 | End: 2020-10-24 | Stop reason: HOSPADM

## 2020-10-23 RX ORDER — HYDROMORPHONE HYDROCHLORIDE 1 MG/ML
0.5 INJECTION, SOLUTION INTRAMUSCULAR; INTRAVENOUS; SUBCUTANEOUS
Status: DISCONTINUED | OUTPATIENT
Start: 2020-10-23 | End: 2020-10-23 | Stop reason: HOSPADM

## 2020-10-23 RX ORDER — GLYCOPYRROLATE 0.2 MG/ML
INJECTION INTRAMUSCULAR; INTRAVENOUS AS NEEDED
Status: DISCONTINUED | OUTPATIENT
Start: 2020-10-23 | End: 2020-10-23 | Stop reason: HOSPADM

## 2020-10-23 RX ORDER — DEXMEDETOMIDINE HYDROCHLORIDE 100 UG/ML
INJECTION, SOLUTION INTRAVENOUS AS NEEDED
Status: DISCONTINUED | OUTPATIENT
Start: 2020-10-23 | End: 2020-10-23 | Stop reason: HOSPADM

## 2020-10-23 RX ORDER — MORPHINE SULFATE 2 MG/ML
2 INJECTION, SOLUTION INTRAMUSCULAR; INTRAVENOUS
Status: DISPENSED | OUTPATIENT
Start: 2020-10-23 | End: 2020-10-24

## 2020-10-23 RX ORDER — LIDOCAINE HYDROCHLORIDE 20 MG/ML
INJECTION, SOLUTION EPIDURAL; INFILTRATION; INTRACAUDAL; PERINEURAL AS NEEDED
Status: DISCONTINUED | OUTPATIENT
Start: 2020-10-23 | End: 2020-10-23 | Stop reason: HOSPADM

## 2020-10-23 RX ORDER — DIPHENHYDRAMINE HYDROCHLORIDE 50 MG/ML
12.5 INJECTION, SOLUTION INTRAMUSCULAR; INTRAVENOUS AS NEEDED
Status: DISCONTINUED | OUTPATIENT
Start: 2020-10-23 | End: 2020-10-23 | Stop reason: HOSPADM

## 2020-10-23 RX ORDER — SUCCINYLCHOLINE CHLORIDE 20 MG/ML
INJECTION INTRAMUSCULAR; INTRAVENOUS AS NEEDED
Status: DISCONTINUED | OUTPATIENT
Start: 2020-10-23 | End: 2020-10-23 | Stop reason: HOSPADM

## 2020-10-23 RX ORDER — CHLORPHENIRAMINE MALEATE 4 MG
1 TABLET ORAL DAILY
Status: DISCONTINUED | OUTPATIENT
Start: 2020-10-24 | End: 2020-10-24 | Stop reason: HOSPADM

## 2020-10-23 RX ORDER — DIAZEPAM 10 MG/2ML
INJECTION INTRAMUSCULAR
Status: COMPLETED
Start: 2020-10-23 | End: 2020-10-23

## 2020-10-23 RX ORDER — NABUMETONE 750 MG/1
750 TABLET, FILM COATED ORAL 2 TIMES DAILY
Status: DISCONTINUED | OUTPATIENT
Start: 2020-10-23 | End: 2020-10-24 | Stop reason: HOSPADM

## 2020-10-23 RX ORDER — PRAVASTATIN SODIUM 40 MG/1
40 TABLET ORAL DAILY
Status: DISCONTINUED | OUTPATIENT
Start: 2020-10-23 | End: 2020-10-24 | Stop reason: HOSPADM

## 2020-10-23 RX ORDER — SODIUM CHLORIDE, SODIUM LACTATE, POTASSIUM CHLORIDE, CALCIUM CHLORIDE 600; 310; 30; 20 MG/100ML; MG/100ML; MG/100ML; MG/100ML
25 INJECTION, SOLUTION INTRAVENOUS CONTINUOUS
Status: DISCONTINUED | OUTPATIENT
Start: 2020-10-23 | End: 2020-10-23 | Stop reason: HOSPADM

## 2020-10-23 RX ORDER — BISMUTH SUBSALICYLATE 262 MG
1 TABLET,CHEWABLE ORAL
Status: DISCONTINUED | OUTPATIENT
Start: 2020-10-24 | End: 2020-10-23 | Stop reason: SDUPTHER

## 2020-10-23 RX ORDER — DIAZEPAM 10 MG/2ML
5 INJECTION INTRAMUSCULAR
Status: DISPENSED | OUTPATIENT
Start: 2020-10-23 | End: 2020-10-24

## 2020-10-23 RX ORDER — SODIUM CHLORIDE 9 MG/ML
125 INJECTION, SOLUTION INTRAVENOUS CONTINUOUS
Status: DISPENSED | OUTPATIENT
Start: 2020-10-23 | End: 2020-10-24

## 2020-10-23 RX ORDER — MIDAZOLAM HYDROCHLORIDE 1 MG/ML
INJECTION, SOLUTION INTRAMUSCULAR; INTRAVENOUS AS NEEDED
Status: DISCONTINUED | OUTPATIENT
Start: 2020-10-23 | End: 2020-10-23 | Stop reason: HOSPADM

## 2020-10-23 RX ORDER — FENTANYL CITRATE 50 UG/ML
25 INJECTION, SOLUTION INTRAMUSCULAR; INTRAVENOUS
Status: DISCONTINUED | OUTPATIENT
Start: 2020-10-23 | End: 2020-10-23 | Stop reason: HOSPADM

## 2020-10-23 RX ORDER — OXYCODONE HYDROCHLORIDE 10 MG/1
10 TABLET ORAL
Status: DISCONTINUED | OUTPATIENT
Start: 2020-10-23 | End: 2020-10-24 | Stop reason: HOSPADM

## 2020-10-23 RX ORDER — LISINOPRIL 40 MG/1
40 TABLET ORAL DAILY
Status: DISCONTINUED | OUTPATIENT
Start: 2020-10-23 | End: 2020-10-24 | Stop reason: HOSPADM

## 2020-10-23 RX ADMIN — PROPOFOL 150 MG: 10 INJECTION, EMULSION INTRAVENOUS at 07:38

## 2020-10-23 RX ADMIN — PRAVASTATIN SODIUM 40 MG: 40 TABLET ORAL at 12:33

## 2020-10-23 RX ADMIN — ASPIRIN 81 MG CHEWABLE TABLET 40.5 MG: 81 TABLET CHEWABLE at 12:32

## 2020-10-23 RX ADMIN — SODIUM CHLORIDE, POTASSIUM CHLORIDE, SODIUM LACTATE AND CALCIUM CHLORIDE: 600; 310; 30; 20 INJECTION, SOLUTION INTRAVENOUS at 09:05

## 2020-10-23 RX ADMIN — PANTOPRAZOLE SODIUM 40 MG: 40 TABLET, DELAYED RELEASE ORAL at 12:32

## 2020-10-23 RX ADMIN — HYDROMORPHONE HYDROCHLORIDE 0.5 MG: 1 INJECTION, SOLUTION INTRAMUSCULAR; INTRAVENOUS; SUBCUTANEOUS at 11:13

## 2020-10-23 RX ADMIN — MIDAZOLAM HYDROCHLORIDE 2 MG: 2 INJECTION, SOLUTION INTRAMUSCULAR; INTRAVENOUS at 07:31

## 2020-10-23 RX ADMIN — DILTIAZEM HYDROCHLORIDE 180 MG: 180 CAPSULE, EXTENDED RELEASE ORAL at 14:59

## 2020-10-23 RX ADMIN — FENTANYL CITRATE 50 MCG: 50 INJECTION, SOLUTION INTRAMUSCULAR; INTRAVENOUS at 10:05

## 2020-10-23 RX ADMIN — OXYCODONE 5 MG: 5 TABLET ORAL at 20:05

## 2020-10-23 RX ADMIN — DIAZEPAM 5 MG: 5 INJECTION, SOLUTION INTRAMUSCULAR; INTRAVENOUS at 20:07

## 2020-10-23 RX ADMIN — MORPHINE SULFATE 2 MG: 2 INJECTION, SOLUTION INTRAMUSCULAR; INTRAVENOUS at 14:58

## 2020-10-23 RX ADMIN — ROCURONIUM BROMIDE 30 MG: 10 SOLUTION INTRAVENOUS at 08:27

## 2020-10-23 RX ADMIN — LIDOCAINE HYDROCHLORIDE 40 MG: 20 INJECTION, SOLUTION EPIDURAL; INFILTRATION; INTRACAUDAL; PERINEURAL at 07:37

## 2020-10-23 RX ADMIN — SENNOSIDES AND DOCUSATE SODIUM 1 TABLET: 8.6; 5 TABLET ORAL at 12:32

## 2020-10-23 RX ADMIN — Medication 10 ML: at 14:58

## 2020-10-23 RX ADMIN — SODIUM CHLORIDE 125 ML/HR: 9 INJECTION, SOLUTION INTRAVENOUS at 12:36

## 2020-10-23 RX ADMIN — DEXMEDETOMIDINE HYDROCHLORIDE 10 MCG: 100 INJECTION, SOLUTION INTRAVENOUS at 07:50

## 2020-10-23 RX ADMIN — CEFAZOLIN SODIUM 2 G: 2 INJECTION, SOLUTION INTRAVENOUS at 08:04

## 2020-10-23 RX ADMIN — DIAZEPAM 5 MG: 5 INJECTION, SOLUTION INTRAMUSCULAR; INTRAVENOUS at 11:30

## 2020-10-23 RX ADMIN — DEXMEDETOMIDINE HYDROCHLORIDE 10 MCG: 100 INJECTION, SOLUTION INTRAVENOUS at 10:24

## 2020-10-23 RX ADMIN — Medication 10 MG: at 09:00

## 2020-10-23 RX ADMIN — ACETAMINOPHEN 650 MG: 325 TABLET, FILM COATED ORAL at 12:32

## 2020-10-23 RX ADMIN — FENTANYL CITRATE 100 MCG: 50 INJECTION, SOLUTION INTRAMUSCULAR; INTRAVENOUS at 07:36

## 2020-10-23 RX ADMIN — ACETAMINOPHEN 650 MG: 325 TABLET, FILM COATED ORAL at 18:12

## 2020-10-23 RX ADMIN — NABUMETONE 750 MG: 750 TABLET, FILM COATED ORAL at 21:00

## 2020-10-23 RX ADMIN — CLOPIDOGREL BISULFATE 75 MG: 75 TABLET ORAL at 12:32

## 2020-10-23 RX ADMIN — FENTANYL CITRATE 50 MCG: 50 INJECTION, SOLUTION INTRAMUSCULAR; INTRAVENOUS at 10:27

## 2020-10-23 RX ADMIN — DEXAMETHASONE SODIUM PHOSPHATE 4 MG: 4 INJECTION, SOLUTION INTRA-ARTICULAR; INTRALESIONAL; INTRAMUSCULAR; INTRAVENOUS; SOFT TISSUE at 08:20

## 2020-10-23 RX ADMIN — GLYCOPYRROLATE 0.4 MG: 0.2 INJECTION, SOLUTION INTRAMUSCULAR; INTRAVENOUS at 10:22

## 2020-10-23 RX ADMIN — SENNOSIDES AND DOCUSATE SODIUM 1 TABLET: 8.6; 5 TABLET ORAL at 20:05

## 2020-10-23 RX ADMIN — ROCURONIUM BROMIDE 20 MG: 10 SOLUTION INTRAVENOUS at 07:47

## 2020-10-23 RX ADMIN — Medication 1 TABLET: at 09:00

## 2020-10-23 RX ADMIN — ONDANSETRON 4 MG: 2 INJECTION INTRAMUSCULAR; INTRAVENOUS at 08:20

## 2020-10-23 RX ADMIN — SODIUM CHLORIDE, POTASSIUM CHLORIDE, SODIUM LACTATE AND CALCIUM CHLORIDE 25 ML/HR: 600; 310; 30; 20 INJECTION, SOLUTION INTRAVENOUS at 06:42

## 2020-10-23 RX ADMIN — DEXMEDETOMIDINE HYDROCHLORIDE 10 MCG: 100 INJECTION, SOLUTION INTRAVENOUS at 09:08

## 2020-10-23 RX ADMIN — LISINOPRIL 40 MG: 40 TABLET ORAL at 14:59

## 2020-10-23 RX ADMIN — Medication 10 MG: at 09:06

## 2020-10-23 RX ADMIN — Medication 10 MG: at 08:55

## 2020-10-23 RX ADMIN — SUCCINYLCHOLINE CHLORIDE 100 MG: 20 INJECTION, SOLUTION INTRAMUSCULAR; INTRAVENOUS at 07:39

## 2020-10-23 RX ADMIN — SUGAMMADEX 200 MG: 100 INJECTION, SOLUTION INTRAVENOUS at 10:48

## 2020-10-23 RX ADMIN — Medication 3 MG: at 10:22

## 2020-10-23 RX ADMIN — FLUOXETINE 40 MG: 20 CAPSULE ORAL at 12:32

## 2020-10-23 NOTE — PERIOP NOTES
Skin assessment completed with Dianne HODGE RN on patient's arrival to [facility].   No open wounds or sores noted, patient does have a bruise on right lower leg and BUE

## 2020-10-23 NOTE — ANESTHESIA PREPROCEDURE EVALUATION
Relevant Problems   No relevant active problems       Anesthetic History   No history of anesthetic complications            Review of Systems / Medical History  Patient summary reviewed, nursing notes reviewed and pertinent labs reviewed    Pulmonary    COPD    Sleep apnea           Neuro/Psych         Psychiatric history     Cardiovascular    Hypertension          Past MI, CAD, cardiac stents and hyperlipidemia      Comments: 10/15/2020 11:43 AM - Cory, Card Result In     Component  Value  Ref Range & Units  Status   Ventricular Rate  54  BPM  Final   Atrial Rate  54  BPM  Final   P-R Interval  328  ms  Final   QRS Duration  88  ms  Final   Q-T Interval  446  ms  Final   QTC Calculation (Bezet)  422  ms  Final   Calculated P Axis  77  degrees  Final   Calculated R Axis  40  degrees  Final   Calculated T Axis  32  degrees  Final   Diagnosis    Final   Sinus bradycardia with 1st degree A-V block   Otherwise normal ECG   No previous ECGs available   Confirmed by JESSICA CASTRO, María Ferrera ([id number]) on 10/15/2020 11:43:28 AM   Result History     EKG, 12 LEAD, INITIAL on 10/15/2020        GI/Hepatic/Renal     GERD           Endo/Other    Diabetes: type 2    Obesity     Other Findings   Comments: Allergies  Hydrochlorothiazide, Zestril (Lisinopril)  Ht: 5' 6.93\" (170 cm)  Weight: 91.2 kg (201 lb)  BMI: 31.55 kg/m²  CrCl:   67.7 mL/min    Procedure  Right L4 - L5 Minimally Invasive Transforaminal Interbody Fusion w/Cage, Percutaneous Pedicle Screw Instrumentation      CAD (coronary artery disease)  HTN (hypertension)  COPD (chronic obstructive pulmonary disease) (HCC)  Sleep apnea  MI (myocardial infarction) ([facility] [address]  DIONISIO (obstructive sleep apnea)  High cholesterol    Depression    Obesity    Tobacco dependence    Adrenal adenoma, left               Physical Exam    Airway  Mallampati: II  TM Distance: 4 - 6 cm  Neck ROM: normal range of motion   Mouth opening: Normal     Cardiovascular    Rhythm: regular  Rate: normal Dental  No notable dental hx       Pulmonary  Breath sounds clear to auscultation               Abdominal  GI exam deferred       Other Findings   Comments: Results for Francisco Torre (MRN [id number]) as of 10/23/2020 07:11    10/15/2020 10:10  WBC: 8.4  RBC: 4.44  HGB: 15.3  HCT: 45.4  MCV: 102.3 (H)  MCH: 34.5 (H)  MCHC: 33.7  RDW: 14.0  PLATELET: 777  MPV: 9.9    INR: 0.9  Prothrombin time: 12.5  aPTT: 29.3    Sodium: 140  Potassium: 4.0  Chloride: 105  CO2: 33 (H)  Anion gap: 2 (L)  Glucose: 96  BUN: 12  Creatinine: 1.06  BUN/Creatinine ratio: 11 (L)  Calcium: 9.8  GFR est non-AA: >60  GFR est AA: >60         Anesthetic Plan    ASA: 3  Anesthesia type: general          Induction: Intravenous  Anesthetic plan and risks discussed with: Patient and Family

## 2020-10-23 NOTE — OP NOTES
PREOPERATIVE DIAGNOSIS  L4-5 spondylolisthesis and right lateral recess facet cyst with stenosis, right leg radiculopathy    POSTOPERATIVE DIAGNOSIS  L4-5 spondylolisthesis and right lateral recess facet cyst with stenosis, right leg radiculopathy    PROCEDURE PERFORMED  1. L4-5 right facetectomy, resection of L4-5 facet cyst (28914)  2. Mini-invasive transforaminal interbody fusion with autograft and allograft (56282)  3. L4-5 cage reconstruction (75861)  4. L4-5 percutaneous non-segmental pedicle screw instrumentation (42736)    Cheryl Hsu MD    ASSISTANT  Dennie Ken, PA-C    ESTIMATED BLOOD LOSS  004 cc     COMPLICATIONS  None    DISPOSITION  Stable to the recovery room after extubation    IMPLANTS  1. DePuy Synthes Concorde Bullet 11 mm x 12 mm x 27 mm lordotic cage at L4-5  2. Vivigen 5 cc allograft  3. DePuy Viper Prime screws, 6 mm x 45 mm at right L4 and left L5, 7 mm x 45 mm at left L4 and right L5    INDICATIONS FOR PROCEDURE  Mr. Gabriel Shields is a 55-year-old man with right L4-5 lateral recess stenosis due to large facet cyst and new spondylolisthesis that occurred several months after L4-5 laminectomy. He failed conservative treatment for his issues including low back pain and right leg radicular symptoms with weakness. I recommended mini-invasive right transforaminal interbody fusion at this level with percutaneous screws. After a thorough discussion of the attendant benefits and risks of surgery, the patient consented to the proposed procedure. DETAILS OF OPERATIVE INTERVENTION  The patient was identified in the holding area and the operative site was marked. He was taken to the OR and placed in the prone position on the Antonio table after intubation. All bony prominences were adequately padded. His back was prepped and draped in the usual sterile fashion. The appropriate time out procedure was called and carried out.  The patient received Ancef 2 g IV within 1 hour before incision. The paramedian incisions were centered over the pedicles of L4 and L5 bilaterally and taken through skin and subcutaneous tissue. The fascia was incised in line with the skin incision on each side and the wounds were packed to obtain hemostasis. The sequential dilators were used to place the spotlight retractor at the right L4-5 level. Facetectomy was then carried out with an osteotome to remove the inferior articular process of L4 and then the superior articular process of L5 after identifying the superior edge of the L5 pedicle. The disc space was identified and the thecal sac was carefully retracted. The exiting L4 root and thecal sac were decompressed thoroughly by undercutting the facet hypertrophy. The medial edge of the L5 superior articular process was then carefully dissected away from the thecal sac and facet cyst.  The facet cyst was identified in the right lateral recess and trimmed away from the thecal sac in pieces. The medial wall of the cyst was very adherent to the thecal sac so it was thin but not completely removed. Adequate decompression was felt to be achieved. The facet cyst material and wall were sent for pathologic evaluation. The disc space was entered with a knife followed by a dilator. Sequential dilation and shaving was then carried out. The debris from the disc shaving was removed with pituitary rongeurs. The disc space was prepared with sizers to select the above indicated cage and a rasp to decorticate the endplates. The cage was packed with morselized local bone autograft. The disc space was packed with Brigido Mailman followed by the remaining local bone. The cage was impacted into place. X-rays confirmed acceptable placement of the cage. Pedicle cannulation was carried out using fluoroscopic visualization with the DePuy Viper Prime system to place the screws into each pedicle without violating the medial or inferior walls.  After confirming adequate cannulation of the pedicles, the screws were seated into the pedicles. During insertion of the left L4 screw, it was felt that the purchase was not as robust so a 7 mm screw was applied in the same hole. The left L5 pedicle was somewhat smaller so a 6 mm x 45 mm screw was used. The right L5 pedicle was again large so a 7 mm screw was used. Viper Prime screws from DePuy were applied bilaterally. Shine length was measured and appropriate length rods were reduced into the screw heads. End caps were applied. The end caps were final tightened into place. The screw tabs were then broken off using standard technique. All hardware position was confirmed to be appropriate on final fluoroscopic views. The wounds were irrigated with sterile saline and closed after establishing strict hemostasis. Closure was accomplished with #1 Vicryl for fascial closure, 2-0 Vicryl for subcutaneous tissue closure and 3-0 Monocryl subcuticular stitch followed by Dermabond Prineo for skin closure and sealing. My PA assisted with patient positioning, prepping, draping, root retraction and application of screws as well as cleaning and passing of instruments. He assisted with cage packing and application of the screws on his side as well as shine placement and endcap tightening. He accomplished the irrigation and closure of the wounds independently. The patient was then turned to the supine position and transported to recovery room in stable condition after extubation. He tolerated the procedure well. No complications were encountered.

## 2020-10-23 NOTE — PROGRESS NOTES
Skin assessment performed with Shiraz Toth RN. Bruise noted on right lower leg anterior and bilateral arms. Skin is intact. No mepliex applied.

## 2020-10-24 VITALS
TEMPERATURE: 98.3 F | OXYGEN SATURATION: 97 % | BODY MASS INDEX: 31.55 KG/M2 | SYSTOLIC BLOOD PRESSURE: 139 MMHG | RESPIRATION RATE: 20 BRPM | HEART RATE: 58 BPM | WEIGHT: 201 LBS | DIASTOLIC BLOOD PRESSURE: 64 MMHG

## 2020-10-24 LAB
ANION GAP SERPL CALC-SCNC: 3 MMOL/L (ref 5–15)
BUN SERPL-MCNC: 11 MG/DL (ref 6–20)
BUN/CREAT SERPL: 12 (ref 12–20)
CA-I BLD-MCNC: 9.2 MG/DL (ref 8.5–10.1)
CHLORIDE SERPL-SCNC: 104 MMOL/L (ref 97–108)
CO2 SERPL-SCNC: 31 MMOL/L (ref 21–32)
CREAT SERPL-MCNC: 0.9 MG/DL (ref 0.7–1.3)
GLUCOSE SERPL-MCNC: 117 MG/DL (ref 65–100)
HGB BLD-MCNC: 12.7 G/DL (ref 12.1–17)
POTASSIUM SERPL-SCNC: 3.9 MMOL/L (ref 3.5–5.1)
SODIUM SERPL-SCNC: 138 MMOL/L (ref 136–145)

## 2020-10-24 PROCEDURE — 97161 PT EVAL LOW COMPLEX 20 MIN: CPT | Performed by: PHYSICAL THERAPIST

## 2020-10-24 PROCEDURE — 74011250636 HC RX REV CODE- 250/636: Performed by: ORTHOPAEDIC SURGERY

## 2020-10-24 PROCEDURE — 36415 COLL VENOUS BLD VENIPUNCTURE: CPT

## 2020-10-24 PROCEDURE — 85018 HEMOGLOBIN: CPT

## 2020-10-24 PROCEDURE — 97530 THERAPEUTIC ACTIVITIES: CPT

## 2020-10-24 PROCEDURE — 80048 BASIC METABOLIC PNL TOTAL CA: CPT

## 2020-10-24 PROCEDURE — 97116 GAIT TRAINING THERAPY: CPT | Performed by: PHYSICAL THERAPIST

## 2020-10-24 PROCEDURE — 74011250637 HC RX REV CODE- 250/637: Performed by: ORTHOPAEDIC SURGERY

## 2020-10-24 PROCEDURE — 97165 OT EVAL LOW COMPLEX 30 MIN: CPT

## 2020-10-24 RX ORDER — CYCLOBENZAPRINE HCL 10 MG
5 TABLET ORAL
Qty: 30 TAB | Refills: 0 | Status: SHIPPED | OUTPATIENT
Start: 2020-10-24

## 2020-10-24 RX ORDER — OXYCODONE HYDROCHLORIDE 5 MG/1
5 TABLET ORAL
Qty: 40 TAB | Refills: 0 | Status: SHIPPED | OUTPATIENT
Start: 2020-10-24 | End: 2020-11-07

## 2020-10-24 RX ADMIN — SODIUM CHLORIDE 125 ML/HR: 9 INJECTION, SOLUTION INTRAVENOUS at 06:23

## 2020-10-24 RX ADMIN — MORPHINE SULFATE 2 MG: 2 INJECTION, SOLUTION INTRAMUSCULAR; INTRAVENOUS at 00:12

## 2020-10-24 RX ADMIN — OXYCODONE 5 MG: 5 TABLET ORAL at 09:16

## 2020-10-24 RX ADMIN — PRAVASTATIN SODIUM 40 MG: 40 TABLET ORAL at 09:16

## 2020-10-24 RX ADMIN — Medication 1 TABLET: at 09:16

## 2020-10-24 RX ADMIN — CLOPIDOGREL BISULFATE 75 MG: 75 TABLET ORAL at 09:16

## 2020-10-24 RX ADMIN — ACETAMINOPHEN 650 MG: 325 TABLET, FILM COATED ORAL at 00:12

## 2020-10-24 RX ADMIN — PANTOPRAZOLE SODIUM 40 MG: 40 TABLET, DELAYED RELEASE ORAL at 09:16

## 2020-10-24 RX ADMIN — LISINOPRIL 40 MG: 40 TABLET ORAL at 09:16

## 2020-10-24 RX ADMIN — ASPIRIN 81 MG CHEWABLE TABLET 40.5 MG: 81 TABLET CHEWABLE at 09:16

## 2020-10-24 RX ADMIN — SENNOSIDES AND DOCUSATE SODIUM 1 TABLET: 8.6; 5 TABLET ORAL at 09:16

## 2020-10-24 RX ADMIN — FLUOXETINE 40 MG: 20 CAPSULE ORAL at 09:16

## 2020-10-24 RX ADMIN — TRAZODONE HYDROCHLORIDE 200 MG: 150 TABLET ORAL at 00:11

## 2020-10-24 RX ADMIN — Medication 10 ML: at 00:14

## 2020-10-24 RX ADMIN — POLYETHYLENE GLYCOL 3350 17 G: 17 POWDER, FOR SOLUTION ORAL at 09:15

## 2020-10-24 RX ADMIN — OXYCODONE 5 MG: 5 TABLET ORAL at 06:22

## 2020-10-24 RX ADMIN — ACETAMINOPHEN 650 MG: 325 TABLET, FILM COATED ORAL at 06:21

## 2020-10-24 NOTE — PROGRESS NOTES
Problem: Mobility Impaired (Adult and Pediatric)  Goal: *Acute Goals and Plan of Care (Insert Text)  Description: Pt will be I with LE HEP in 7 days. Pt will perform bed mobility with SBA in 7 days. Pt will perform transfers with SBA  in 7 days. Pt will amb 50 feet with LRAD safely with mod I in 7 days. Outcome: Not Met     Problem: Patient Education: Go to Patient Education Activity  Goal: Patient/Family Education  Outcome: Not Met     PHYSICAL THERAPY EVALUATION  Patient: Gorden Kawasaki (32 y.o. male)  Date: 10/24/2020  Primary Diagnosis: Lumbar radiculopathy [M54.16]  Closed wedge compression fracture of T12 vertebra with routine healing, subsequent encounter [S22.080D]  Acquired spondylolisthesis of lumbosacral region [M43.17]  Synovial cyst of lumbar facet joint [M71.38]  Lumbar radiculopathy [M54.16]  Procedure(s) (LRB):  Right L4 - L5 Minimally Invasive Transforaminal Interbody Fusion w/Cage, Percutaneous Pedicle Screw Instrumentation, Autograft, & Allograft (Right) 1 Day Post-Op   Precautions: Spinal       ASSESSMENT  Pt is 66 y/o male came to [facility]  and adm 10/23/2020 for L4-L5 right facectomy, restionof L4-L5 facet cyst, fusion with allograft and cage reconstruction with percutaneous nonsegmental pedicle screw instrumentation (10/23/2020 by Dr. Gretchen Quiñones). Pt has hx of COPD, sleep apnea, HTN, MI, CAD s/p cardiac stent placement, HLD, GERD, Dm type II. Per pt report, pt lives with family in 1 story home with ramp to enter and is independent for self care and functional transfers/mobility (has shower chair, rollator and cane however does not use). Pt educated on back precautions and log rolling technique with pt demonstrating and verbalizing understanding. Pt currently functioning at baseline independence for self care and functional transfers/mobility thus will D/C after PT eval. Recommend discharge to home independently when medically appropriate.   He did report that he has some baseline weakness in R knee but is happy with how he functions at this time. PT edu him that in the future if he would like therapy to increase the strength of R knee, he is welcomed to speak to MD.      PLAN :  Recommendations and Planned Interventions:Does not need therapy at this time. Frequency/Duration: Patient will be followed by physical therapy: No therapy needed after today's eval    Recommendation for discharge: (in order for the patient to meet his/her long term goals)  Home Ind withotu therapy    This discharge recommendation:  Has not yet been discussed the attending provider and/or case management    IF patient discharges home will need the following DME: none         SUBJECTIVE:   Patient stated I feel like my normal self.     OBJECTIVE DATA SUMMARY:   HISTORY:    Past Medical History:   Diagnosis Date    Anxiety and depression     Back pain     pt states for several years    CAD (coronary artery disease)     pt states several MI's 1st [address] and last MI 2008     COPD (chronic obstructive pulmonary disease) ([facility] [address]     Diabetes ([facility] [address]     pt states not currently on meds for diabetes     Elevated cholesterol     GERD (gastroesophageal reflux disease)     Hip pain, right     Hypertension     Lung nodule     DIONISIO (obstructive sleep apnea)     pt states not using CPAP    Sciatica      Past Surgical History:   Procedure Laterality Date    HX ANGIOPLASTY      HX BACK SURGERY  02/19/2020    lower back    HX CHOLECYSTECTOMY      HX COLONOSCOPY  02/2016    HX HEART CATHETERIZATION      pt states has stents in heart , pt states unsure how many    HX REFRACTIVE SURGERY  12/2016       Personal factors and/or comorbidities impacting plan of care: see pt chart    Home Situation  Home Environment: Private residence  Wheelchair Ramp: Yes  One/Two Story Residence: One story  Living Alone: No  Support Systems: Family member(s)  Patient Expects to be Discharged to[de-identified] Private residence  Current DME Used/Available at Home: Vicki Avery straight, Shower chair, Walker, rollator    PLOF: Pt Ind for ADLS/IADLS, Ind with mobility prior to admission. EXAMINATION/PRESENTATION/DECISION MAKING:   Critical Behavior:  Neurologic State: Alert  Orientation Level: Oriented X4  Cognition: Appropriate decision making, Appropriate for age attention/concentration, Appropriate safety awareness       Range Of Motion:  AROM: Within functional limits            Strength:    Strength: Within functional limits                 Functional Mobility:  Bed Mobility:  Rolling: Independent  Supine to Sit: Independent     Scooting: Independent  Transfers:  Sit to Stand: Independent  Stand to Sit: Independent        Bed to Chair: Independent              Balance:   Sitting: Intact; Without support  Standing: Intact; Without support  Ambulation/Gait Training:            Functional Measure:    [address] [place] [id number] AM-PAC 6 Clicks         Basic Mobility Inpatient Short Form  How much difficulty does the patient currently have. .. Unable A Lot A Little None   1. Turning over in bed (including adjusting bedclothes, sheets and blankets)? [] 1   [] 2   [] 3   [x] 4   2. Sitting down on and standing up from a chair with arms ( e.g., wheelchair, bedside commode, etc.)   [] 1   [] 2   [] 3   [x] 4   3. Moving from lying on back to sitting on the side of the bed? [] 1   [] 2   [] 3   [x] 4          How much help from another person does the patient currently need. .. Total A Lot A Little None   4. Moving to and from a bed to a chair (including a wheelchair)? [] 1   [] 2   [] 3   [x] 4   5. Need to walk in hospital room? [] 1   [] 2   [] 3   [x] 4   6. Climbing 3-5 steps with a railing? [] 1   [] 2   [] 3   [x] 4   © 2007, Trustees of [address] [place] [address] [address] [address], under license to [employer].  All rights reserved     Score:  Initial: MS Most Recent: X (Date: -- )   Interpretation of Tool:  Represents activities that are increasingly more difficult (i.e. Bed mobility, Transfers, Gait).  Score 24 23 22-20 19-15 14-10 9-7 6   Modifier CH CI CJ CK CL CM CN          Physical Therapy Evaluation Charge Determination   History Examination Presentation Decision-Making   LOW Complexity : Zero comorbidities / personal factors that will impact the outcome / POC LOW Complexity : 1-2 Standardized tests and measures addressing body structure, function, activity limitation and / or participation in recreation  LOW Complexity : Stable, uncomplicated  Other Functional Measure AMPA 6 LOW      Based on the above components, the patient evaluation is determined to be of the following complexity level: LOW     Pain Ratin/10    Activity Tolerance: WNL  Please refer to the flowsheet for vital signs taken during this treatment. After treatment patient left in no apparent distress:   Sitting in chair and Call bell within reach    COMMUNICATION/EDUCATION:   The patients plan of care was discussed with: Occupational therapist.     Fall prevention education was provided and the patient/caregiver indicated understanding. PT/OT sessions occurred together for increased safety of pt and clinician.       Thank you for this referral.  Tonia Wakefield   Time Calculation: 25 mins

## 2020-10-24 NOTE — PROGRESS NOTES
OCCUPATIONAL THERAPY EVALUATION/DISCHARGE  Patient: Lashawn Osorio (89 y.o. male)  Date: 10/24/2020  Primary Diagnosis: Lumbar radiculopathy [M54.16]  Closed wedge compression fracture of T12 vertebra with routine healing, subsequent encounter [S22.080D]  Acquired spondylolisthesis of lumbosacral region [M43.17]  Synovial cyst of lumbar facet joint [M71.38]  Lumbar radiculopathy [M54.16]  Procedure(s) (LRB):  Right L4 - L5 Minimally Invasive Transforaminal Interbody Fusion w/Cage, Percutaneous Pedicle Screw Instrumentation, Autograft, & Allograft (Right) 1 Day Post-Op   Precautions: back precautions       ASSESSMENT  Pt is 64 y/o male came to [facility]  and adm 10/23/2020 for L4-L5 right facectomy, restionof L4-L5 facet cyst, fusion with allograft and cage reconstruction with percutaneous nonsegmental pedicle screw instrumentation (10/23/2020 by Dr. Cele Guerra). Pt has hx of COPD, sleep apnea, HTN, MI, CAD s/p cardiac stent placement, HLD, GERD, Dm type II. Per pt report, pt lives with family in 1 story home with ramp to enter and is independent for self care and functional transfers/mobility (has shower chair, rollator and cane however does not use). Pt educated on back precautions and log rolling technique with pt demonstrating and verbalizing understanding. Pt currently functioning at baseline independence for self care and functional transfers/mobility thus will D/C after OT eval. Recommend discharge to home independently when medically appropriate. PLAN :  Recommend with staff: independent    Recommendation for discharge: (in order for the patient to meet his/her long term goals)  No skilled occupational therapy/ follow up rehabilitation needs identified at this time. This discharge recommendation:  Has been made in collaboration with the attending provider and/or case management    IF patient discharges home will need the following DME: none       SUBJECTIVE:   Patient stated Cheli Penaloza I try on my own?  when initially completing sit<->stand    OBJECTIVE DATA SUMMARY:   HISTORY:   Past Medical History:   Diagnosis Date    Anxiety and depression     Back pain     pt states for several years    CAD (coronary artery disease)     pt states several MI's 1st 0 and last MI 2008     COPD (chronic obstructive pulmonary disease) ([facility] [address]     Diabetes ([facility] [address]     pt states not currently on meds for diabetes     Elevated cholesterol     GERD (gastroesophageal reflux disease)     Hip pain, right     Hypertension     Lung nodule     DIONISIO (obstructive sleep apnea)     pt states not using CPAP    Sciatica      Past Surgical History:   Procedure Laterality Date    HX ANGIOPLASTY      HX BACK SURGERY  02/19/2020    lower back    HX CHOLECYSTECTOMY      HX COLONOSCOPY  02/2016    HX HEART CATHETERIZATION      pt states has stents in heart , pt states unsure how many    HX REFRACTIVE SURGERY  12/2016       Prior Level of Function/Environment/Context: independent  Expanded or extensive additional review of patient history:   Home Situation  Home Environment: Private residence  Wheelchair Ramp: Yes  One/Two Story Residence: One story  Living Alone: No  Support Systems: Family member(s)  Patient Expects to be Discharged to[de-identified] Private residence  Current DME Used/Available at Home: Ashely Bears, straight, Shower chair, Walker, rollator    EXAMINATION OF PERFORMANCE DEFICITS:  Cognitive/Behavioral Status:  Neurologic State: Alert  Orientation Level: Oriented X4  Cognition: Appropriate decision making; Appropriate for age attention/concentration; Appropriate safety awareness     Range of Motion:  AROM: Within functional limits     Strength:  Strength: Within functional limits     Balance:  Sitting: Intact; Without support  Standing: Intact; Without support    Functional Mobility and Transfers for ADLs:  Bed Mobility:  Rolling: Independent  Supine to Sit: Independent  Scooting: Independent    Transfers:  Sit to Stand: Independent  Stand to Sit: Independent  Bed to Chair: Independent  Bathroom Mobility: Independent  Toilet Transfer : Independent    ADL Assessment:     Oral Facial Hygiene/Grooming: Independent    Lower Body Dressing: Independent    Toileting: Independent     ADL Intervention and task modifications:    Grooming  Grooming Assistance: Independent  Position Performed: Standing  Washing Face: Independent  Washing Hands: Independent  Brushing Teeth: Independent    Lower Body Dressing Assistance  Socks: Independent    Toileting  Toileting Assistance: Independent  Bladder Hygiene: Independent  Bowel Hygiene: Independent  Clothing Management: [facility] AM-PACTM \"6 Clicks\"                                                       Daily Activity Inpatient Short Form  How much help from another person does the patient currently need. .. Total; A Lot A Little None   1. Putting on and taking off regular lower body clothing? []  1 []  2 []  3 [x]  4   2. Bathing (including washing, rinsing, drying)? []  1 []  2 []  3 [x]  4   3. Toileting, which includes using toilet, bedpan or urinal? [] 1 []  2 []  3 [x]  4   4. Putting on and taking off regular upper body clothing? []  1 []  2 []  3 [x]  4   5. Taking care of personal grooming such as brushing teeth? []  1 []  2 []  3 [x]  4   6. Eating meals? []  1 []  2 []  3 [x]  4   © 2007, Trustees of [address] [place] [address] [address] [address], under license to [employer]. All rights reserved     Score: 24/24     Interpretation of Tool:  Represents clinically-significant functional categories (i.e. Activities of daily living).   Percentage of Impairment CH    0%   CI    1-19% CJ    20-39% CK    40-59% CL    60-79% CM    80-99% CN     100%   [facility]  Score 6-24 24 23 20-22 15-19 10-14 7-9 6       Occupational Therapy Evaluation Charge Determination   History Examination Decision-Making   LOW Complexity : Brief history review  LOW Complexity : 1-3 performance deficits relating to physical, cognitive , or psychosocial skils that result in activity limitations and / or participation restrictions  LOW Complexity : No comorbidities that affect functional and no verbal or physical assistance needed to complete eval tasks       Based on the above components, the patient evaluation is determined to be of the following complexity level: LOW   Pain Rating:  3/10 back pain    Activity Tolerance: WNL  Please refer to the flowsheet for vital signs taken during this treatment. After treatment patient left in no apparent distress:    Sitting in chair    COMMUNICATION/EDUCATION:   The patients plan of care was discussed with: Physical therapist.   PT/OT sessions occurred together for increased safety of pt and clinician.       Thank you for this referral.  Jeannine Search  Time Calculation: 25 mins

## 2020-10-24 NOTE — PROGRESS NOTES
Discharged patient to home/self care with written and verbal instructions  Patient indicated understanding   Removed all telemetry and IV equipment  Wheeled patient terry personal belongings to the ER enterance of the hospital to a waiting private vehicle at [phone]

## 2020-10-28 NOTE — ANESTHESIA POSTPROCEDURE EVALUATION
Procedure(s):  Right L4 - L5 Minimally Invasive Transforaminal Interbody Fusion w/Cage, Percutaneous Pedicle Screw Instrumentation, Autograft, & Allograft.     general    Anesthesia Post Evaluation      Multimodal analgesia: multimodal analgesia used between 6 hours prior to anesthesia start to PACU discharge  Patient location during evaluation: PACU  Patient participation: complete - patient participated  Level of consciousness: awake  Pain score: 0  Pain management: adequate  Airway patency: patent  Anesthetic complications: no  Cardiovascular status: acceptable  Respiratory status: acceptable  Hydration status: acceptable  Post anesthesia nausea and vomiting:  controlled  Final Post Anesthesia Temperature Assessment:  Normothermia (36.0-37.5 degrees C)      INITIAL Post-op Vital signs:   Vitals Value Taken Time   BP 99/59 10/23/2020 11:46 AM   Temp 36.3 °C (97.4 °F) 10/23/2020 10:58 AM   Pulse 50 10/23/2020 11:46 AM   Resp 16 10/23/2020 11:46 AM   SpO2 100 % 10/23/2020 11:46 AM

## 2021-03-15 ENCOUNTER — TRANSCRIBE ORDER (OUTPATIENT)
Dept: SCHEDULING | Age: 71
End: 2021-03-15

## 2021-03-15 DIAGNOSIS — I71.40 ABDOMINAL AORTIC ANEURYSM WITHOUT RUPTURE: Primary | ICD-10-CM

## 2021-03-16 NOTE — DISCHARGE SUMMARY
Discharge Summary     Patient: Mehdi Lynne MRN: [id number]  SSN: [id number]    YOB: 1950  Age: 65 y.o.   Sex: male       Admit Date: 10/23/2020    Discharge Date: 3/16/2021      Admission Diagnoses: Lumbar radiculopathy [M54.16]  Closed wedge compression fracture of T12 vertebra with routine healing, subsequent encounter [S22.080D]  Acquired spondylolisthesis of lumbosacral region [M43.17]  Synovial cyst of lumbar facet joint [M71.38]  Lumbar radiculopathy [M54.16]    Discharge Diagnoses:   Problem List as of 10/24/2020 Date Reviewed: 10/23/2020          Codes Class Noted - Resolved    Lumbar radiculopathy ICD-10-CM: M54.16  ICD-9-CM: 724.4  10/23/2020 - Present        Acquired spondylolisthesis of lumbosacral region ICD-10-CM: M43.17  ICD-9-CM: 738.4  10/23/2020 - Present        Synovial cyst of lumbar facet joint ICD-10-CM: M71.38  ICD-9-CM: 727.40  10/23/2020 - Present        DIONISIO (obstructive sleep apnea) ICD-10-CM: G47.33  ICD-9-CM: 327.23  6/20/2020 - Present        Adrenal adenoma, left ICD-10-CM: D35.02  ICD-9-CM: 227.0  8/16/2018 - Present        CAD (coronary artery disease) ICD-10-CM: I25.10  ICD-9-CM: 414.00  8/9/2010 - Present        HTN (hypertension) ICD-10-CM: I10  ICD-9-CM: 401.9  8/9/2010 - Present        High cholesterol ICD-10-CM: E78.00  ICD-9-CM: 272.0  8/9/2010 - Present        Depression ICD-10-CM: F32.9  ICD-9-CM: 733  8/9/2010 - Present        COPD (chronic obstructive pulmonary disease) ([facility] ICD-10-CM: J44.9  ICD-9-CM: 974  8/9/2010 - Present        Sleep apnea ICD-10-CM: G47.30  ICD-9-CM: 780.57  8/9/2010 - Present    Overview Signed 8/9/2010  2:52 PM by Beka Luis     on CPAP             Obesity ICD-10-CM: E66.9  ICD-9-CM: 278.00  8/9/2010 - Present        Tobacco dependence ICD-10-CM: F17.200  ICD-9-CM: 305.1  8/9/2010 - Present        MI (myocardial infarction) ([facility] ICD-10-CM: I21.9  ICD-9-CM: 410.90  8/9/1998 - Present               Discharge Condition: Good    Hospital Course: Patient was admitted via same-day surgery and taken to the operating room for a L4-5 ,MINI TLIF. Intraoperative blood loss was 500 cc. There were no intraoperative complications. Postoperatively physical therapy and Occupational Therapy were consulted for ambulation. Patient progressed nicely with recovery had no postoperative complications. When he was medically stable on 10/24/2020 he was discharged home with appropriate follow-up instructions for 2 weeks after surgery. He was given pain medications along with instructions on the use. He had restrictions of no bending, twisting, or lifting more than 5 pounds. He is permitted to shower postop day 5. He expressed understanding discussion of follow-up instructions. He was advised to contact Dr. Eric Guevara myself or present to the emergency department if noting fevers or chills or increasing pain. Consults: None    Significant Diagnostic Studies: labs: I have reviewed all the lab results. There are some abnormalities that are not critical to the patient's health, but I would like to discuss these in person at an office appointment. Please ask him to schedule a follow up visit with me at his convenience. Disposition: home    Discharge Medications:   Cannot display discharge medications since this patient is not currently admitted. Activity: No lifting, Driving, or Strenuous exercise for 2 weeks  Diet: Regular Diet  Wound Care: Keep wound clean and dry    Follow-up Appointments   Procedures    FOLLOW UP VISIT Appointment in: Two Weeks     Standing Status:   Standing     Number of Occurrences:   1     Order Specific Question:   Appointment in     Answer:    Two Weeks       Signed By: Amy Gutierrez PA-C     March 16, 2021

## 2021-03-17 LAB
ACTH PLAS-MCNC: 21 PG/ML (ref 7.2–63.3)
ALDOST SERPL-MCNC: 2 NG/DL (ref 0–30)
BUN SERPL-MCNC: 12 MG/DL (ref 8–27)
BUN/CREAT SERPL: 10 (ref 10–24)
CALCIUM SERPL-MCNC: 9.6 MG/DL (ref 8.6–10.2)
CHLORIDE SERPL-SCNC: 103 MMOL/L (ref 96–106)
CO2 SERPL-SCNC: 24 MMOL/L (ref 20–29)
CORTIS AM PEAK SERPL-MCNC: 9.9 UG/DL (ref 6.2–19.4)
CORTIS BS SAL-MCNC: 0.51 UG/DL
CORTIS SP1 P CHAL SAL-MCNC: 0.07 UG/DL
CREAT SERPL-MCNC: 1.2 MG/DL (ref 0.76–1.27)
GLUCOSE SERPL-MCNC: 105 MG/DL (ref 65–99)
METANEPH FREE SERPL-MCNC: 32.8 PG/ML (ref 0–88)
NORMETANEPHRINE SERPL-MCNC: 78.3 PG/ML (ref 0–191.8)
POTASSIUM SERPL-SCNC: 4.2 MMOL/L (ref 3.5–5.2)
RENIN PLAS-CCNC: 0.92 NG/ML/HR (ref 0.17–5.38)
SODIUM SERPL-SCNC: 139 MMOL/L (ref 134–144)

## 2021-05-14 ENCOUNTER — TRANSCRIBE ORDER (OUTPATIENT)
Dept: SCHEDULING | Age: 71
End: 2021-05-14

## 2021-05-14 DIAGNOSIS — R06.02 SHORTNESS OF BREATH: Primary | ICD-10-CM

## 2021-05-24 ENCOUNTER — HOSPITAL ENCOUNTER (OUTPATIENT)
Dept: CT IMAGING | Age: 71
Discharge: HOME | End: 2021-05-24
Attending: INTERNAL MEDICINE
Payer: MEDICARE

## 2021-05-24 DIAGNOSIS — R06.02 SHORTNESS OF BREATH: ICD-10-CM

## 2021-05-24 PROCEDURE — 71250 CT THORAX DX C-: CPT

## 2021-05-27 PROBLEM — M47.819 SPONDYLOSIS WITHOUT MYELOPATHY: Status: ACTIVE | Noted: 2021-05-27

## 2021-05-31 ENCOUNTER — HOSPITAL ENCOUNTER (EMERGENCY)
Age: 71
Discharge: HOME | End: 2021-06-01
Payer: MEDICARE

## 2021-05-31 ENCOUNTER — APPOINTMENT (OUTPATIENT)
Dept: GENERAL RADIOLOGY | Age: 71
End: 2021-05-31
Attending: EMERGENCY MEDICINE
Payer: MEDICARE

## 2021-05-31 DIAGNOSIS — S92.424A CLOSED NONDISPLACED FRACTURE OF DISTAL PHALANX OF RIGHT GREAT TOE, INITIAL ENCOUNTER: Primary | ICD-10-CM

## 2021-05-31 PROCEDURE — 73660 X-RAY EXAM OF TOE(S): CPT

## 2021-05-31 PROCEDURE — 99283 EMERGENCY DEPT VISIT LOW MDM: CPT

## 2021-05-31 PROCEDURE — 74011250637 HC RX REV CODE- 250/637: Performed by: PHYSICIAN ASSISTANT

## 2021-05-31 RX ORDER — CEPHALEXIN 500 MG/1
500 CAPSULE ORAL 3 TIMES DAILY
Qty: 21 CAPSULE | Refills: 0 | Status: SHIPPED | OUTPATIENT
Start: 2021-05-31 | End: 2021-06-07

## 2021-05-31 RX ORDER — HYDROCODONE BITARTRATE AND ACETAMINOPHEN 5; 325 MG/1; MG/1
1 TABLET ORAL
Status: COMPLETED | OUTPATIENT
Start: 2021-05-31 | End: 2021-05-31

## 2021-05-31 RX ORDER — HYDROCODONE BITARTRATE AND ACETAMINOPHEN 5; 325 MG/1; MG/1
1 TABLET ORAL
Qty: 10 TABLET | Refills: 0 | Status: SHIPPED | OUTPATIENT
Start: 2021-05-31 | End: 2021-06-03

## 2021-05-31 RX ORDER — CEPHALEXIN 500 MG/1
500 CAPSULE ORAL ONCE
Status: COMPLETED | OUTPATIENT
Start: 2021-05-31 | End: 2021-05-31

## 2021-05-31 RX ADMIN — HYDROCODONE BITARTRATE AND ACETAMINOPHEN 1 TABLET: 5; 325 TABLET ORAL at 23:11

## 2021-05-31 RX ADMIN — CEPHALEXIN 500 MG: 500 CAPSULE ORAL at 23:11

## 2021-06-01 VITALS
BODY MASS INDEX: 30.66 KG/M2 | WEIGHT: 207 LBS | DIASTOLIC BLOOD PRESSURE: 81 MMHG | TEMPERATURE: 98.1 F | OXYGEN SATURATION: 98 % | HEIGHT: 69 IN | RESPIRATION RATE: 16 BRPM | SYSTOLIC BLOOD PRESSURE: 167 MMHG | HEART RATE: 63 BPM

## 2021-06-01 NOTE — DISCHARGE INSTRUCTIONS
Thank you! Thank you for allowing me to care for you in the emergency department. I sincerely hope that you are satisfied with your visit today. It is my goal to provide you with excellent care. Below you will find a list of your labs and imaging from your visit today. Should you have any questions regarding these results please do not hesitate to call the emergency department. Labs -   No results found for this or any previous visit (from the past 12 hour(s)). Radiologic Studies -   XR GREAT TOE RT MIN 2 V   Final Result   Findings/impression: The bones are demineralized. Nondisplaced fracture   involving the first distal phalanx. Suspected extension to the articular   surface. Negative for dislocation. CT Results  (Last 48 hours)      None          CXR Results  (Last 48 hours)      None               If you feel that you have not received excellent quality care or timely care, please ask to speak to the nurse manager. Please choose us in the future for your continued health care needs. ------------------------------------------------------------------------------------------------------------  The exam and treatment you received in the Emergency Department were for an urgent problem and are not intended as complete care. It is important that you follow-up with a doctor, nurse practitioner, or physician assistant to:  (1) confirm your diagnosis,  (2) re-evaluation of changes in your illness and treatment, and  (3) for ongoing care. If your symptoms become worse or you do not improve as expected and you are unable to reach your usual health care provider, you should return to the Emergency Department. We are available 24 hours a day. Please take your discharge instructions with you when you go to your follow-up appointment. If you have any problem arranging a follow-up appointment, contact the Emergency Department immediately.     If a prescription has been provided, please have it filled as soon as possible to prevent a delay in treatment. Read the entire medication instruction sheet provided to you by the pharmacy. If you have any questions or reservations about taking the medication due to side effects or interactions with other medications, please call your primary care physician or contact the ER to speak with the charge nurse. Make an appointment with your family doctor or the physician you were referred to for follow-up of this visit as instructed on your discharge paperwork, as this is a mandatory follow-up. Return to the ER if you are unable to be seen or if you are unable to be seen in a timely manner. If you have any problem arranging the follow-up visit, contact the Emergency Department immediately.

## 2021-06-01 NOTE — ED PROVIDER NOTES
EMERGENCY DEPARTMENT HISTORY AND PHYSICAL EXAM      Date: 5/31/2021  Patient Name: Amilcar Castro    History of Presenting Illness     Chief Complaint   Patient presents with    Toe Injury       History Provided By: Patient    HPI: Amilcar Castro, 65 y.o. male with a past medical history significant for diabetes, hypertension, COPD, CAD, DIONISIO, GERD who presents to the ED with cc of sudden onset, gradually worsening, constant R great toe pain, swelling, and bruising which started yesterday after the base of an outdoor umbrella fell out of a box and landed on his R great toe. Symptoms exacerbated with movement and to palpation. He states he had a large area of bruising and swelling and took a sterilized needle and popped it yesterday. He has taken Tylenol, with no relief. States he is currently only on aspirin, no other anticoagulants. No history of diabetes. Denies any other injuries. Tetanus is up to date. There are no other complaints, changes, or physical findings at this time. PCP: Hugh Vences, DO    No current facility-administered medications on file prior to encounter. Current Outpatient Medications on File Prior to Encounter   Medication Sig Dispense Refill    cyclobenzaprine (FLEXERIL) 10 mg tablet Take 0.5 Tabs by mouth three (3) times daily as needed for Muscle Spasm(s). 30 Tab 0    nabumetone (RELAFEN) 750 mg tablet Take 750 mg by mouth two (2) times a day.  multivitamin/iron/folic acid (SENTRY PO) Take 1 Tab by mouth daily.  docosahexaenoic acid/epa (FISH OIL PO) Take 1 Tab by mouth daily. 1200 mg 360mg of omega 3      b-complex with vitamin c (Super B Complex-Vitamin C) tablet Take 1 Tab by mouth daily.  clopidogreL (PLAVIX) 75 mg tab Take 75 mg by mouth daily.  dilTIAZem ER (CARDIZEM CD) 180 mg capsule Take 180 mg by mouth daily.       lisinopril (PRINIVIL, ZESTRIL) 40 mg tablet TAKE 1 TABLET BY MOUTH EVERY DAY 90 Tab 3    FLUoxetine (PROZAC) 40 mg capsule Take 40 mg by mouth daily.  pantoprazole (PROTONIX) 40 mg tablet Take 40 mg by mouth daily.  traZODone (DESYREL) 100 mg tablet Take 200 mg by mouth nightly.  vitamin E acetate (VITAMIN E PO) Take 180 Units by mouth daily.  pravastatin (PRAVACHOL) 40 mg tablet TAKE 1 TABLET BY MOUTH EVERY DAY 30 Tab 6    aspirin (Aspirin Childrens) 81 mg chewable tablet Take 0.5 Tabs by mouth daily. Past History     Past Medical History:  Past Medical History:   Diagnosis Date    Anxiety and depression     Back pain     pt states for several years    CAD (coronary artery disease)     pt states several MI's 1st 0 and last MI 2008     [facility]) [facility]     Diabetes ([facility]     pt states not currently on meds for diabetes     Elevated cholesterol     GERD (gastroesophageal reflux disease)     Hip pain, right     Hypertension     Lung nodule     DIONISIO (obstructive sleep apnea)     pt states not using CPAP    Sciatica        Past Surgical History:  Past Surgical History:   Procedure Laterality Date    HX ANGIOPLASTY      HX BACK SURGERY  02/19/2020    lower back    HX CHOLECYSTECTOMY      HX COLONOSCOPY  02/2016    HX HEART CATHETERIZATION      pt states has stents in heart , pt states unsure how many    HX REFRACTIVE SURGERY  12/2016       Family History:  Family History   Problem Relation Age of Onset    Diabetes Father     Cancer Father     Hypertension Son     Parkinson's Disease Mother        Social History:  Social History     Tobacco Use    Smoking status: Current Every Day Smoker     Packs/day: 1.00    Smokeless tobacco: Never Used   Substance Use Topics    Alcohol use: Yes     Comment: occasional    Drug use: No       Allergies:   Allergies   Allergen Reactions    Hydrochlorothiazide Other (comments)     kidney problem    Zestril [Lisinopril] Cough         Review of Systems     Review of Systems   Constitutional: Negative for chills, fatigue and fever.   HENT: Negative. Respiratory: Negative for cough, chest tightness, shortness of breath and wheezing. Cardiovascular: Negative for chest pain and palpitations. Gastrointestinal: Negative for abdominal pain, diarrhea, nausea and vomiting. Genitourinary: Negative for frequency and urgency. Musculoskeletal: Positive for arthralgias (R great toe) and joint swelling (R great toe). Negative for back pain, neck pain and neck stiffness. Skin: Negative for rash. Neurological: Negative for dizziness, weakness, light-headedness and headaches. Psychiatric/Behavioral: Negative. All other systems reviewed and are negative. Physical Exam     Physical Exam  Vitals and nursing note reviewed. Constitutional:       General: He is not in acute distress. Appearance: Normal appearance. He is not ill-appearing or toxic-appearing. HENT:      Head: Normocephalic and atraumatic. Nose: Nose normal.      Mouth/Throat:      Mouth: Mucous membranes are moist.   Eyes:      General: Lids are normal.      Conjunctiva/sclera:      Right eye: Right conjunctiva is not injected. Left eye: Left conjunctiva is not injected. Cardiovascular:      Rate and Rhythm: Normal rate and regular rhythm. Heart sounds: No murmur heard. No friction rub. No gallop. Pulmonary:      Effort: Pulmonary effort is normal. No tachypnea or respiratory distress. Breath sounds: Normal breath sounds. No stridor or decreased air movement. Musculoskeletal:         General: Swelling, tenderness and signs of injury present. No deformity. Cervical back: Normal range of motion and neck supple. Right lower leg: No edema. Left lower leg: No edema. Comments: R great toe: pictured below: diffuse ecchymosis to R great toe. Swelling. Decreased ROM secondary to pain and swelling. Sensation intact distally. Capillary refill < 3 seconds. Skin:     General: Skin is warm.       Capillary Refill: Capillary refill takes less than 2 seconds. Findings: Ecchymosis present. Comments: Ecchymotic lesions diffusely BL upper extremities from aspirin use   Neurological:      General: No focal deficit present. Mental Status: He is alert and oriented to person, place, and time. Mental status is at baseline. Psychiatric:         Mood and Affect: Mood normal.         Behavior: Behavior is cooperative. Thought Content: Thought content normal.         Judgment: Judgment normal.             Lab and Diagnostic Study Results     Labs -   No results found for this or any previous visit (from the past 12 hour(s)). Radiologic Studies -   XR Results (most recent):  Results from Hospital Encounter encounter on 05/31/21    XR GREAT TOE RT MIN 2 V    Narrative  XR GREAT TOE RT MIN 2 V    Comparison: None available    Impression  Findings/impression: The bones are demineralized. Nondisplaced fracture  involving the first distal phalanx. Suspected extension to the articular  surface. Negative for dislocation. Medical Decision Making   - I am the first provider for this patient. - I reviewed the vital signs, available nursing notes, past medical history, past surgical history, family history and social history. - Initial assessment performed. The patients presenting problems have been discussed, and they are in agreement with the care plan formulated and outlined with them. I have encouraged them to ask questions as they arise throughout their visit. Vital Signs-Reviewed the patient's vital signs.   Patient Vitals for the past 12 hrs:   Temp Pulse Resp BP SpO2   05/31/21 2159 98.1 °F (36.7 °C) 63 16 (!) 183/85 98 %       Records Reviewed: Nursing Notes and Old Medical Records    The patient presents with R great toe pain with a differential diagnosis of fracture, contusion, hematoma      ED Course:          Provider Notes (Medical Decision Making):     MDM  Number of Diagnoses or Management Options  Closed nondisplaced fracture of distal phalanx of right great toe, initial encounter  Diagnosis management comments:     65year old male with R great toe pain after injury. Also punctured a hematoma with a sterilized needle. Patient NVI distally. XR positive for fracture of the distal phalanx of R great toe. Will place in bulky dressing, post-op shoe, and have patient f/u with podiatry. Will also start on Keflex given patient broke the skin in the area with needle at home. Tetanus is up to date. Recommend RICE at home as well for symptomatic relief. Return precautions discussed. Amount and/or Complexity of Data Reviewed  Tests in the radiology section of CPT®: ordered and reviewed  Review and summarize past medical records: yes  Independent visualization of images, tracings, or specimens: yes    Patient Progress  Patient progress: stable           Disposition   Disposition: DC- Adult Discharges: All of the diagnostic tests were reviewed and questions answered. Diagnosis, care plan and treatment options were discussed. The patient understands the instructions and will follow up as directed. The patients results have been reviewed with them. They have been counseled regarding their diagnosis. The patient verbally convey understanding and agreement of the signs, symptoms, diagnosis, treatment and prognosis and additionally agrees to follow up as recommended with their PCP in 24 - 48 hours. They also agree with the care-plan and convey that all of their questions have been answered. I have also put together some discharge instructions for them that include: 1) educational information regarding their diagnosis, 2) how to care for their diagnosis at home, as well a 3) list of reasons why they would want to return to the ED prior to their follow-up appointment, should their condition change. DISCHARGE PLAN:  1.    Current Discharge Medication List      CONTINUE these medications which have NOT CHANGED    Details   cyclobenzaprine (FLEXERIL) 10 mg tablet Take 0.5 Tabs by mouth three (3) times daily as needed for Muscle Spasm(s). Qty: 30 Tab, Refills: 0      nabumetone (RELAFEN) 750 mg tablet Take 750 mg by mouth two (2) times a day. multivitamin/iron/folic acid (SENTRY PO) Take 1 Tab by mouth daily. docosahexaenoic acid/epa (FISH OIL PO) Take 1 Tab by mouth daily. 1200 mg 360mg of omega 3      b-complex with vitamin c (Super B Complex-Vitamin C) tablet Take 1 Tab by mouth daily. clopidogreL (PLAVIX) 75 mg tab Take 75 mg by mouth daily. dilTIAZem ER (CARDIZEM CD) 180 mg capsule Take 180 mg by mouth daily. lisinopril (PRINIVIL, ZESTRIL) 40 mg tablet TAKE 1 TABLET BY MOUTH EVERY DAY  Qty: 90 Tab, Refills: 3    Associated Diagnoses: Adrenal adenoma, left      FLUoxetine (PROZAC) 40 mg capsule Take 40 mg by mouth daily. Associated Diagnoses: Adrenal adenoma, left      pantoprazole (PROTONIX) 40 mg tablet Take 40 mg by mouth daily. Associated Diagnoses: Adrenal adenoma, left      traZODone (DESYREL) 100 mg tablet Take 200 mg by mouth nightly. Associated Diagnoses: Adrenal adenoma, left      vitamin E acetate (VITAMIN E PO) Take 180 Units by mouth daily. Associated Diagnoses: Adrenal adenoma, left      pravastatin (PRAVACHOL) 40 mg tablet TAKE 1 TABLET BY MOUTH EVERY DAY  Qty: 30 Tab, Refills: 6      aspirin (Aspirin Childrens) 81 mg chewable tablet Take 0.5 Tabs by mouth daily. 2.   Follow-up Information     Follow up With Specialties Details Why Contact Info    Vaughn Pandey DPM Podiatry Schedule an appointment as soon as possible for a visit   [address] [place] [id number] [phone]      [address] [place] [address] DEPT Emergency Medicine  As needed, If symptoms worsen [address] [facility] [address] [phone]        3. Return to ED if worse   4.    Current Discharge Medication List      START taking these medications Details   HYDROcodone-acetaminophen (NORCO) 5-325 mg per tablet Take 1 Tablet by mouth every six (6) hours as needed for Pain for up to 3 days. Max Daily Amount: 4 Tablets. Qty: 10 Tablet, Refills: 0  Start date: 5/31/2021, End date: 6/3/2021    Associated Diagnoses: Closed nondisplaced fracture of distal phalanx of right great toe, initial encounter      cephALEXin (Keflex) 500 mg capsule Take 1 Capsule by mouth three (3) times daily for 7 days. Qty: 21 Capsule, Refills: 0  Start date: 5/31/2021, End date: 6/7/2021               Diagnosis     Clinical Impression:   1. Closed nondisplaced fracture of distal phalanx of right great toe, initial encounter        Attestations:    CORY Vasquez    Please note that this dictation was completed with [employer], the computer voice recognition software. Quite often unanticipated grammatical, syntax, homophones, and other interpretive errors are inadvertently transcribed by the computer software. Please disregard these errors. Please excuse any errors that have escaped final proofreading. Thank you.

## 2021-06-01 NOTE — ED TRIAGE NOTES
Pt reports crushed right big toe yesterday. States base of umbrella fell out of box and landed on foot. C/o pain and swelling.

## 2021-06-16 ENCOUNTER — HOSPITAL ENCOUNTER (OUTPATIENT)
Dept: ULTRASOUND IMAGING | Age: 71
Discharge: HOME | End: 2021-06-16
Attending: FAMILY MEDICINE
Payer: MEDICARE

## 2021-06-16 ENCOUNTER — HOSPITAL ENCOUNTER (OUTPATIENT)
Dept: ULTRASOUND IMAGING | Age: 71
End: 2021-06-16
Attending: FAMILY MEDICINE
Payer: MEDICARE

## 2021-06-16 ENCOUNTER — TRANSCRIBE ORDER (OUTPATIENT)
Dept: SCHEDULING | Age: 71
End: 2021-06-16

## 2021-06-16 DIAGNOSIS — I71.40 ABDOMINAL AORTIC ANEURYSM WITHOUT RUPTURE: ICD-10-CM

## 2021-06-16 DIAGNOSIS — I71.40 ABDOMINAL AORTIC ANEURYSM WITHOUT RUPTURE: Primary | ICD-10-CM

## 2021-06-16 PROCEDURE — 76775 US EXAM ABDO BACK WALL LIM: CPT

## 2021-06-17 ENCOUNTER — OFFICE VISIT (OUTPATIENT)
Dept: ENDOCRINOLOGY | Age: 71
End: 2021-06-17
Payer: MEDICARE

## 2021-06-17 VITALS
TEMPERATURE: 98.3 F | OXYGEN SATURATION: 96 % | DIASTOLIC BLOOD PRESSURE: 71 MMHG | BODY MASS INDEX: 31.52 KG/M2 | WEIGHT: 208 LBS | HEART RATE: 58 BPM | HEIGHT: 68 IN | SYSTOLIC BLOOD PRESSURE: 142 MMHG

## 2021-06-17 DIAGNOSIS — I10 ESSENTIAL HYPERTENSION: ICD-10-CM

## 2021-06-17 DIAGNOSIS — D35.02 ADRENAL ADENOMA, LEFT: Primary | ICD-10-CM

## 2021-06-17 LAB
ANION GAP SERPL CALC-SCNC: 4 MMOL/L (ref 5–15)
BUN SERPL-MCNC: 16 MG/DL (ref 6–20)
BUN/CREAT SERPL: 15 (ref 12–20)
CALCIUM SERPL-MCNC: 10.1 MG/DL (ref 8.5–10.1)
CHLORIDE SERPL-SCNC: 107 MMOL/L (ref 97–108)
CO2 SERPL-SCNC: 29 MMOL/L (ref 21–32)
CREAT SERPL-MCNC: 1.07 MG/DL (ref 0.7–1.3)
GLUCOSE SERPL-MCNC: 106 MG/DL (ref 65–100)
POTASSIUM SERPL-SCNC: 4.4 MMOL/L (ref 3.5–5.1)
SODIUM SERPL-SCNC: 140 MMOL/L (ref 136–145)

## 2021-06-17 PROCEDURE — 99214 OFFICE O/P EST MOD 30 MIN: CPT | Performed by: INTERNAL MEDICINE

## 2021-06-17 PROCEDURE — G8417 CALC BMI ABV UP PARAM F/U: HCPCS | Performed by: INTERNAL MEDICINE

## 2021-06-17 PROCEDURE — G8427 DOCREV CUR MEDS BY ELIG CLIN: HCPCS | Performed by: INTERNAL MEDICINE

## 2021-06-17 PROCEDURE — G8753 SYS BP > OR = 140: HCPCS | Performed by: INTERNAL MEDICINE

## 2021-06-17 PROCEDURE — G8536 NO DOC ELDER MAL SCRN: HCPCS | Performed by: INTERNAL MEDICINE

## 2021-06-17 PROCEDURE — 3017F COLORECTAL CA SCREEN DOC REV: CPT | Performed by: INTERNAL MEDICINE

## 2021-06-17 PROCEDURE — 1101F PT FALLS ASSESS-DOCD LE1/YR: CPT | Performed by: INTERNAL MEDICINE

## 2021-06-17 PROCEDURE — G8754 DIAS BP LESS 90: HCPCS | Performed by: INTERNAL MEDICINE

## 2021-06-17 PROCEDURE — G9717 DOC PT DX DEP/BP F/U NT REQ: HCPCS | Performed by: INTERNAL MEDICINE

## 2021-06-17 RX ORDER — LACTULOSE 10 G/15ML
SOLUTION ORAL; RECTAL 3 TIMES DAILY
COMMUNITY

## 2021-06-17 RX ORDER — DEXAMETHASONE 1 MG/1
TABLET ORAL
Qty: 1 TABLET | Refills: 0 | Status: SHIPPED | OUTPATIENT
Start: 2021-06-17

## 2021-06-17 NOTE — PROGRESS NOTES
Rashida Frias MD      Patient Information  Date:6/17/2021  Name : Micheline Osman 66 y.o.     YOB: 1950         Referred by: Robynn Gottron, DO       Chief Complaint   Patient presents with    Adrenal Problem       History of Present Illness: Micheline Osman is a 66 y.o. male   He was initially referred for evaluation of left adrenal nodule with was found incidentally on CT scan. He was last seen in 2020  Needs screening labs for adrenal nodule      He has hypertension which is usually well controlled on medications, sleep apnea and uses CPAP machine  No hypokalemia, has palpitations intermittently but no spells of severe hypertension, chest pain, sweating and headaches. Joint pain      No chest pain, shortness of breath, fever, cough    Past Medical History:   Diagnosis Date    Anxiety and depression     Back pain     pt states for several years    CAD (coronary artery disease)     pt states several MI's 1st 0 and last MI 2008     [facility]     Diabetes ([facility]     pt states not currently on meds for diabetes     Elevated cholesterol     GERD (gastroesophageal reflux disease)     Hip pain, right     Hypertension     Lung nodule     DIONISIO (obstructive sleep apnea)     pt states not using CPAP    Sciatica      Past Surgical History:   Procedure Laterality Date    HX ANGIOPLASTY      HX BACK SURGERY  02/19/2020    lower back    HX CHOLECYSTECTOMY      HX COLONOSCOPY  02/2016    HX HEART CATHETERIZATION      pt states has stents in heart , pt states unsure how many    HX REFRACTIVE SURGERY  12/2016     Current Outpatient Medications   Medication Sig    lactulose (CHRONULAC) 10 gram/15 mL solution Take  by mouth three (3) times daily.  multivitamin/iron/folic acid (SENTRY PO) Take 1 Tab by mouth daily.  docosahexaenoic acid/epa (FISH OIL PO) Take 1 Tab by mouth daily.  1200 mg 360mg of omega 3    b-complex with vitamin c (Super B Complex-Vitamin C) tablet Take 1 Tab by mouth daily.  clopidogreL (PLAVIX) 75 mg tab Take 75 mg by mouth daily.  dilTIAZem ER (CARDIZEM CD) 180 mg capsule Take 180 mg by mouth daily.  lisinopril (PRINIVIL, ZESTRIL) 40 mg tablet TAKE 1 TABLET BY MOUTH EVERY DAY    FLUoxetine (PROZAC) 40 mg capsule Take 40 mg by mouth daily.  pantoprazole (PROTONIX) 40 mg tablet Take 40 mg by mouth daily.  traZODone (DESYREL) 100 mg tablet Take 200 mg by mouth nightly.  vitamin E acetate (VITAMIN E PO) Take 180 Units by mouth daily.  aspirin (Aspirin Childrens) 81 mg chewable tablet Take 0.5 Tabs by mouth daily.  dexAMETHasone (DECADRON) 1 mg tablet Take 1 tab at 11 PM the night before AM labs. Have labs drawn between 7:30 - 8:30 AM.    cyclobenzaprine (FLEXERIL) 10 mg tablet Take 0.5 Tabs by mouth three (3) times daily as needed for Muscle Spasm(s). (Patient not taking: Reported on 6/17/2021)    nabumetone (RELAFEN) 750 mg tablet Take 750 mg by mouth two (2) times a day. (Patient not taking: Reported on 6/17/2021)    pravastatin (PRAVACHOL) 40 mg tablet TAKE 1 TABLET BY MOUTH EVERY DAY (Patient not taking: Reported on 6/17/2021)     No current facility-administered medications for this visit. Allergies   Allergen Reactions    Hydrochlorothiazide Other (comments)     kidney problem    Zestril [Lisinopril] Cough         Review of Systems: Per HPI    Physical Examination:  Blood pressure (!) 142/71, pulse (!) 58, temperature 98.3 °F (36.8 °C), height 5' 8\" (1.727 m), weight 208 lb (94.3 kg), SpO2 96 %. Body mass index is 31.63 kg/m².   - General: pleasant, no distress, good eye contact  - HEENT: no exophthalmos, no periorbital edema, EOMI  - Neck: No thyromegaly  - CVS: S1-S2 regular  - RS: Normal respiratory effort  - Musculoskeletal: no tremors  - Neurological: alert and oriented  - Psychiatric: normal mood and affect  - Skin: Normal color  -     Data Reviewed:     Assessment/Plan:     1. Adrenal adenoma, left    2. Essential hypertension        Left adrenal adenoma incidentally found on CT scan-3 cm in 2018  2018 plasma metanephrines, aldosterone, renin normal, cortisol was elevated, 1 saliva test normal  late-night salivary cortisol, 1 abnormal  No excess weight gain  2021: Screening labs ordered, low-dose dexamethasone suppression test  Discussed need for follow-up CT abdomen    Excess hormone secretion may develop in 20% of patients with previously documented nonfunctional adrenal tumors  during follow up. Annual biochemical evaluation for hormone hypersecretion for upto to 5 yrs is recommended especially if the tumor size is > 3 cm. Hypertension: Slightly elevated        Obstructive sleep apnea: On CPAP          Thank you for allowing me to participate in the care of this patient. Prince Montez MD      Patient Instructions   Take Dexamethasone 1 tablet at 11pm the night before and go to the lab for blood  draw between 7:30-8:30 the next morning. It  is very important for the blood to be drawn before 8:30 AM      I have ordered scan/test and if you do not hear from the hospital in 3- 4 business days  please call the number [phone] to speak with the scheduling team directly. Follow-up and Dispositions    · Return in about 1 year (around 6/17/2022) for AM labs soon. Patient /caregiver verbalized understanding . Voice-recognition software was used to generate this report, which may result in some phonetic-based errors in the grammar and contents. Even though attempts were made to correct all the mistakes, some may have been missed and remained in the body of the report.

## 2021-06-17 NOTE — LETTER
6/17/2021 Patient: Radhames Osborn YOB: 1950 Date of Visit: 6/17/2021 Luke Dowell, [address] [address] Via Fax: [phone] Mejia Hayden MD 
Narne Mcfarlane [address] [address] Via Fax: [phone] Dear DO Mejia Kimbrough MD, Thank you for referring Mr. Cindy Diamond to [id number] [place] [address] for evaluation. My notes for this consultation are attached. If you have questions, please do not hesitate to call me. I look forward to following your patient along with you. Sincerely, Bala Feng MD

## 2021-06-17 NOTE — PATIENT INSTRUCTIONS
Take Dexamethasone 1 tablet at 11pm the night before and go to the lab for blood  draw between 7:30-8:30 the next morning. It  is very important for the blood to be drawn before 8:30 AM 
 
 
I have ordered scan/test and if you do not hear from the hospital in 3- 4 business days  please call the number [phone] to speak with the scheduling team directly.

## 2021-06-21 LAB
ALDOST SERPL-MCNC: 3.2 NG/DL (ref 0–30)
ALDOST/RENIN PLAS-RTO: 0.2 {RATIO} (ref 0–30)
RENIN PLAS-CCNC: 14.59 NG/ML/HR (ref 0.17–5.38)

## 2021-06-22 LAB
METANEPH FREE SERPL-MCNC: 17.6 PG/ML (ref 0–88)
NORMETANEPHRINE SERPL-MCNC: 116.3 PG/ML (ref 0–191.8)

## 2021-06-25 ENCOUNTER — HOSPITAL ENCOUNTER (OUTPATIENT)
Dept: CT IMAGING | Age: 71
Discharge: HOME | End: 2021-06-25
Payer: MEDICARE

## 2021-06-25 DIAGNOSIS — D35.02 ADRENAL ADENOMA, LEFT: ICD-10-CM

## 2021-06-25 PROCEDURE — 74170 CT ABD WO CNTRST FLWD CNTRST: CPT

## 2021-06-25 PROCEDURE — 74011000636 HC RX REV CODE- 636: Performed by: INTERNAL MEDICINE

## 2021-06-25 RX ADMIN — IOPAMIDOL 100 ML: 755 INJECTION, SOLUTION INTRAVENOUS at 09:54

## 2021-06-26 NOTE — PROGRESS NOTES
Adrenal growth is the same size and no concern    He has enlarged aorta ( which he should be aware of ) and continue to follow up with PCP

## 2021-06-28 ENCOUNTER — TELEPHONE (OUTPATIENT)
Dept: ENDOCRINOLOGY | Age: 71
End: 2021-06-28

## 2021-06-28 NOTE — TELEPHONE ENCOUNTER
Informed pt of Dr. Gemma Jeronimo note. Pt verbalized understanding and asked for results to be faxed to PCP. Results faxed.

## 2021-06-28 NOTE — TELEPHONE ENCOUNTER
----- Message from Bala Feng MD sent at 6/26/2021 11:43 AM EDT -----  Adrenal growth is the same size and no concern    He has enlarged aorta ( which he should be aware of ) and continue to follow up with PCP

## 2021-07-06 LAB
CORTIS AM PEAK SERPL-MCNC: 1.9 UG/DL (ref 6.2–19.4)
DEXAMETHASONE SERPL-MCNC: 477 NG/DL

## 2021-07-08 ENCOUNTER — TRANSCRIBE ORDER (OUTPATIENT)
Dept: SCHEDULING | Age: 71
End: 2021-07-08

## 2021-07-08 DIAGNOSIS — R06.02 SHORTNESS OF BREATH: Primary | ICD-10-CM

## 2021-07-08 DIAGNOSIS — R91.8 LUNG NODULES: ICD-10-CM

## 2021-11-22 ENCOUNTER — HOSPITAL ENCOUNTER (OUTPATIENT)
Dept: CT IMAGING | Age: 71
Discharge: HOME | End: 2021-11-22
Attending: INTERNAL MEDICINE
Payer: MEDICARE

## 2021-11-22 DIAGNOSIS — R91.8 LUNG NODULES: ICD-10-CM

## 2021-11-22 DIAGNOSIS — R06.02 SHORTNESS OF BREATH: ICD-10-CM

## 2021-11-22 PROCEDURE — 71250 CT THORAX DX C-: CPT

## 2022-03-18 PROBLEM — M43.17 ACQUIRED SPONDYLOLISTHESIS OF LUMBOSACRAL REGION: Status: ACTIVE | Noted: 2020-10-23

## 2022-03-18 PROBLEM — D35.02 ADRENAL ADENOMA, LEFT: Status: ACTIVE | Noted: 2018-08-16

## 2022-03-19 PROBLEM — M47.819 SPONDYLOSIS WITHOUT MYELOPATHY: Status: ACTIVE | Noted: 2021-05-27

## 2022-03-19 PROBLEM — M54.16 LUMBAR RADICULOPATHY: Status: ACTIVE | Noted: 2020-10-23

## 2022-03-19 PROBLEM — G47.33 OSA (OBSTRUCTIVE SLEEP APNEA): Status: ACTIVE | Noted: 2020-06-20

## 2022-03-19 PROBLEM — M71.38 SYNOVIAL CYST OF LUMBAR FACET JOINT: Status: ACTIVE | Noted: 2020-10-23

## 2022-07-19 DIAGNOSIS — D35.02 ADRENAL ADENOMA, LEFT: Primary | ICD-10-CM

## 2022-07-19 RX ORDER — DEXAMETHASONE 1 MG/1
TABLET ORAL
Qty: 1 TABLET | Refills: 0 | Status: SHIPPED | OUTPATIENT
Start: 2022-07-19 | End: 2022-07-20 | Stop reason: SDUPTHER

## 2022-07-20 ENCOUNTER — TELEPHONE (OUTPATIENT)
Dept: ENDOCRINOLOGY | Age: 72
End: 2022-07-20

## 2022-07-20 DIAGNOSIS — D35.02 ADRENAL ADENOMA, LEFT: ICD-10-CM

## 2022-07-20 RX ORDER — DEXAMETHASONE 1 MG/1
TABLET ORAL
Qty: 1 TABLET | Refills: 0 | Status: SHIPPED | OUTPATIENT
Start: 2022-07-20

## 2022-07-20 NOTE — TELEPHONE ENCOUNTER
Attempted to call. Unsuccessful. Left msg for Lina Medrano to give us a call back at the office. A callback number was left.     Need to separate sets of AM labs, second set being dexamethasone suppression test.

## 2022-08-02 ENCOUNTER — OFFICE VISIT (OUTPATIENT)
Dept: ENDOCRINOLOGY | Age: 72
End: 2022-08-02
Payer: MEDICARE

## 2022-08-02 VITALS
BODY MASS INDEX: 33.95 KG/M2 | DIASTOLIC BLOOD PRESSURE: 80 MMHG | WEIGHT: 224 LBS | OXYGEN SATURATION: 99 % | HEART RATE: 66 BPM | SYSTOLIC BLOOD PRESSURE: 167 MMHG | TEMPERATURE: 98.1 F | HEIGHT: 68 IN

## 2022-08-02 DIAGNOSIS — D35.02 ADRENAL ADENOMA, LEFT: Primary | ICD-10-CM

## 2022-08-02 DIAGNOSIS — I10 ESSENTIAL HYPERTENSION: ICD-10-CM

## 2022-08-02 PROCEDURE — G8754 DIAS BP LESS 90: HCPCS | Performed by: INTERNAL MEDICINE

## 2022-08-02 PROCEDURE — 3017F COLORECTAL CA SCREEN DOC REV: CPT | Performed by: INTERNAL MEDICINE

## 2022-08-02 PROCEDURE — G8427 DOCREV CUR MEDS BY ELIG CLIN: HCPCS | Performed by: INTERNAL MEDICINE

## 2022-08-02 PROCEDURE — G8753 SYS BP > OR = 140: HCPCS | Performed by: INTERNAL MEDICINE

## 2022-08-02 PROCEDURE — G8536 NO DOC ELDER MAL SCRN: HCPCS | Performed by: INTERNAL MEDICINE

## 2022-08-02 PROCEDURE — G9717 DOC PT DX DEP/BP F/U NT REQ: HCPCS | Performed by: INTERNAL MEDICINE

## 2022-08-02 PROCEDURE — G8417 CALC BMI ABV UP PARAM F/U: HCPCS | Performed by: INTERNAL MEDICINE

## 2022-08-02 PROCEDURE — 1123F ACP DISCUSS/DSCN MKR DOCD: CPT | Performed by: INTERNAL MEDICINE

## 2022-08-02 PROCEDURE — 1101F PT FALLS ASSESS-DOCD LE1/YR: CPT | Performed by: INTERNAL MEDICINE

## 2022-08-02 PROCEDURE — 99214 OFFICE O/P EST MOD 30 MIN: CPT | Performed by: INTERNAL MEDICINE

## 2022-08-02 NOTE — LETTER
8/6/2022    Patient: Rena Juarez   YOB: 1950   Date of Visit: 8/2/2022     Chad Chase MD  [address] [place] [id number]  Via Fax: [phone]    Dear Chad Chase MD,      Thank you for referring Mr. Aidee Larson to [address] [place] [address] for evaluation. My notes for this consultation are attached. If you have questions, please do not hesitate to call me. I look forward to following your patient along with you.       Sincerely,    Janell Estes MD

## 2022-08-02 NOTE — PROGRESS NOTES
Lindsey Strong MD      Patient Information  Date:8/2/2022  Name : Rayo Randolph 74 y.o.     YOB: 1950         Referred by: Guilherme Jernigan       Chief Complaint   Patient presents with    Follow-up       History of Present Illness: Raoy Randolph is a 74 y.o. male   He was initially referred for evaluation of left adrenal nodule with was found incidentally on CT scan. No spells    He has hypertension which is usually well controlled on medications, sleep apnea and uses CPAP machine  No hypokalemia, has palpitations intermittently but no spells of severe hypertension, chest pain, sweating and headaches. Joint pain      No chest pain, shortness of breath, fever, cough    Past Medical History:   Diagnosis Date    Anxiety and depression     Back pain     pt states for several years    CAD (coronary artery disease)     pt states several MI's 1st 0 and last MI 2008     COPD (chronic obstructive [facility] disease) ([facility]     Diabetes ([facility]     pt states not currently on meds for diabetes     Elevated cholesterol     GERD (gastroesophageal reflux disease)     Hip pain, right     Hypertension     Lung nodule     DIONISIO (obstructive sleep apnea)     pt states not using CPAP    Sciatica      Past Surgical History:   Procedure Laterality Date    HX ANGIOPLASTY      HX BACK SURGERY  02/19/2020    lower back    HX CHOLECYSTECTOMY      HX COLONOSCOPY  02/2016    HX HEART CATHETERIZATION      pt states has stents in heart , pt states unsure how many    HX REFRACTIVE SURGERY  12/2016     Current Outpatient Medications   Medication Sig    lactulose (CHRONULAC) 10 gram/15 mL solution Take  by mouth three (3) times daily. multivitamin/iron/folic acid (SENTRY PO) Take 1 Tab by mouth daily. docosahexaenoic acid/epa (FISH OIL PO) Take 1 Tab by mouth daily. 1200 mg 360mg of omega 3    b-complex with vitamin c tablet Take 1 Tab by mouth daily.     clopidogreL (PLAVIX) 75 mg tab Take 75 mg by mouth daily. dilTIAZem ER (CARDIZEM CD) 180 mg capsule Take 180 mg by mouth daily. lisinopril (PRINIVIL, ZESTRIL) 40 mg tablet TAKE 1 TABLET BY MOUTH EVERY DAY    FLUoxetine (PROZAC) 40 mg capsule Take 40 mg by mouth daily. pantoprazole (PROTONIX) 40 mg tablet Take 40 mg by mouth daily. traZODone (DESYREL) 100 mg tablet Take 200 mg by mouth nightly. vitamin E acetate (VITAMIN E PO) Take 180 Units by mouth daily. aspirin 81 mg chewable tablet Take 0.5 Tabs by mouth daily. dexAMETHasone (DECADRON) 1 mg tablet Please take the pill at 11 PM, and go to the lab to have the blood drawn before 9 AM (Patient not taking: Reported on 8/2/2022)    dexAMETHasone (DECADRON) 1 mg tablet Take 1 tab at 11 PM the night before AM labs. Have labs drawn between 7:30 - 8:30 AM. (Patient not taking: Reported on 8/2/2022)    cyclobenzaprine (FLEXERIL) 10 mg tablet Take 0.5 Tabs by mouth three (3) times daily as needed for Muscle Spasm(s). (Patient not taking: No sig reported)    nabumetone (RELAFEN) 750 mg tablet Take 750 mg by mouth two (2) times a day. (Patient not taking: No sig reported)    pravastatin (PRAVACHOL) 40 mg tablet TAKE 1 TABLET BY MOUTH EVERY DAY (Patient not taking: No sig reported)     No current facility-administered medications for this visit. Allergies   Allergen Reactions    Hydrochlorothiazide Other (comments)     kidney problem    Zestril [Lisinopril] Cough         Review of Systems: Per HPI    Physical Examination:  Blood pressure (!) 167/80, pulse 66, temperature 98.1 °F (36.7 °C), temperature source Temporal, height 5' 8\" (1.727 m), weight 224 lb (101.6 kg), SpO2 99 %. Body mass index is 34.06 kg/m².   General: pleasant, no distress, good eye contact  HEENT: no exophthalmos, no periorbital edema, EOMI  Neck: No thyromegaly  CVS: S1-S2 regular  RS: Normal respiratory effort  Musculoskeletal: no tremors  Neurological: alert and oriented  Psychiatric: normal mood and affect  Skin: Normal color      Data Reviewed:     Assessment/Plan:     1. Adrenal adenoma, left        Left adrenal adenoma incidentally found on CT scan-3 cm in 2018  2018 plasma metanephrines, aldosterone, renin normal, cortisol was elevated, 1 saliva test normal  late-night salivary cortisol, 1 abnormal.  Was lost for follow-up  2021: Low-dose dexamethasone suppression test, cortisol 1.9, appropriate dexamethasone level  CT abdomen for unrelated reason checked 2021: 2.2 cm left adrenal nodule, right adrenal thickening  2022: Dexamethasone suppression test, cortisol 2.1, appropriate dexamethasone levels  Late-night salivary cortisol ordered, plasma metanephrines normal  No diabetes    Excess hormone secretion may develop in 20% of patients with previously documented nonfunctional adrenal tumors  during follow up. Annual biochemical evaluation for hormone hypersecretion for upto to 5 yrs is recommended especially if the tumor size is > 3 cm. Hypertension: Not controlled        Obstructive sleep apnea: On CPAP          Thank you for allowing me to participate in the care of this patient. Daniele Ch MD      There are no Patient Instructions on file for this visit. Patient /caregiver verbalized understanding . Voice-recognition software was used to generate this report, which may result in some phonetic-based errors in the grammar and contents. Even though attempts were made to correct all the mistakes, some may have been missed and remained in the body of the report. Alert and oriented to person, place and time

## 2022-08-02 NOTE — PROGRESS NOTES
Identified pt with two pt identifiers. Reviewed record in preparation for visit and have obtained necessary documentation. All patient medications has been reviewed. Chief Complaint   Patient presents with    Follow-up     Additional information about chief complaint:    Visit Vitals  BP (!) 167/80 (BP 1 Location: Left upper arm, BP Patient Position: Sitting, BP Cuff Size: Large adult)   Pulse 66   Temp 98.1 °F (36.7 °C) (Temporal)   Ht 5' 8\" (1.727 m)   Wt 224 lb (101.6 kg)   SpO2 99%   BMI 34.06 kg/m²       Health Maintenance Due   Topic    Hepatitis C Screening     COVID-19 Vaccine (1)    Pneumococcal 65+ years (1 - PCV)    Depression Monitoring     DTaP/Tdap/Td series (1 - Tdap)    Shingrix Vaccine Age 50> (1 of 2)    Colorectal Cancer Screening Combo     Lipid Screen     Medicare Yearly Exam        1. Have you been to the ER, urgent care clinic since your last visit? Hospitalized since your last visit? No    2. Have you seen or consulted any other health care providers outside of the [address] [place] [address] since your last visit? Include any pap smears or colon screening.  No

## 2022-08-03 LAB
CORTIS AM PEAK SERPL-MCNC: 2.1 UG/DL (ref 6.2–19.4)
DEXAMETHASONE SERPL-MCNC: 539 NG/DL
METANEPH FREE SERPL-MCNC: <10 PG/ML (ref 0–88)
NORMETANEPHRINE SERPL-MCNC: 37.8 PG/ML (ref 0–285.2)

## 2022-08-04 ENCOUNTER — TELEPHONE (OUTPATIENT)
Dept: ENDOCRINOLOGY | Age: 72
End: 2022-08-04

## 2022-08-04 NOTE — TELEPHONE ENCOUNTER
Per Dr. Jasbir Logan, informed pt of result note, as noted above. Pt verbalized understanding and will think about it and give us a call back. No further questions or concerns at this time.

## 2022-08-04 NOTE — TELEPHONE ENCOUNTER
----- Message from Loki Smith MD sent at 8/4/2022 12:54 PM EDT -----  2 of the hormones from adrenal gland came back normal.one hormone  Cortisol is minimally elevated, sometimes stress can increase it. Would recommend  late-night salivary cortisol.

## 2022-08-04 NOTE — PROGRESS NOTES
2 of the hormones from adrenal gland came back normal.one hormone  Cortisol is minimally elevated, sometimes stress can increase it. Would recommend  late-night salivary cortisol.

## 2022-08-05 ENCOUNTER — DOCUMENTATION ONLY (OUTPATIENT)
Dept: ENDOCRINOLOGY | Age: 72
End: 2022-08-05

## 2022-08-12 ENCOUNTER — HOSPITAL ENCOUNTER (OUTPATIENT)
Dept: GENERAL RADIOLOGY | Age: 72
Discharge: HOME | End: 2022-08-12
Payer: MEDICARE

## 2022-08-12 ENCOUNTER — TRANSCRIBE ORDER (OUTPATIENT)
Dept: REGISTRATION | Age: 72
End: 2022-08-12

## 2022-08-12 DIAGNOSIS — R06.02 SHORTNESS OF BREATH: ICD-10-CM

## 2022-08-12 DIAGNOSIS — R06.02 SHORTNESS OF BREATH: Primary | ICD-10-CM

## 2022-08-12 PROCEDURE — 71046 X-RAY EXAM CHEST 2 VIEWS: CPT

## 2022-09-03 LAB
CORTIS BS SAL-MCNC: NORMAL UG/DL
CORTIS SP1 P CHAL SAL-MCNC: 0.05 UG/DL

## 2022-09-07 NOTE — PROGRESS NOTES
The salivary cortisol they could only run it on one swab which is normal.  They could not do the second 1 as there was not enough sample.   Option 1: Recheck the salivary cortisol, if negative follow-up in a year as scheduled    Option 2-we will continue to monitor and come back in 6 months to do the salivary cortisol and the blood test

## 2022-09-08 ENCOUNTER — TELEPHONE (OUTPATIENT)
Dept: ENDOCRINOLOGY | Age: 72
End: 2022-09-08

## 2022-09-08 DIAGNOSIS — D35.02 ADRENAL ADENOMA, LEFT: Primary | ICD-10-CM

## 2022-09-08 NOTE — TELEPHONE ENCOUNTER
Pt changed his mind after being transferred. Will repeat test now. Order placed for pt per verbal order with read back from Dr. Mallory Green 09/08/22  Kit and order placed at  for .

## 2022-09-08 NOTE — TELEPHONE ENCOUNTER
Informed pt of Dr. Negar Kent note. Pt verbalized understanding and will go with the second option. Transferred pt to the  to schedule 6 mo follow up.

## 2022-09-08 NOTE — TELEPHONE ENCOUNTER
----- Message from [place] Bustamante MD sent at 9/7/2022  4:27 PM EDT -----  The salivary cortisol they could only run it on one swab which is normal.  They could not do the second 1 as there was not enough sample.   Option 1: Recheck the salivary cortisol, if negative follow-up in a year as scheduled    Option 2-we will continue to monitor and come back in 6 months to do the salivary cortisol and the blood test

## 2022-09-08 NOTE — TELEPHONE ENCOUNTER
Attempted to call. Unsuccessful. Left msg for Rosy Mckeon to give us a call back at the office. A callback number was left.

## 2022-09-16 DIAGNOSIS — D35.02 ADRENAL ADENOMA, LEFT: ICD-10-CM

## 2022-09-26 LAB
CORTIS BS SAL-MCNC: 0.02 UG/DL
CORTIS SP1 P CHAL SAL-MCNC: 0.04 UG/DL

## 2022-09-29 ENCOUNTER — TELEPHONE (OUTPATIENT)
Dept: ENDOCRINOLOGY | Age: 72
End: 2022-09-29

## 2022-09-29 NOTE — TELEPHONE ENCOUNTER
Per Dr. Alton Gonzalez,, informed pt of result note, as noted above. Pt verbalized understanding with no further questions or concerns at this time.

## 2022-09-29 NOTE — TELEPHONE ENCOUNTER
Attempted to call. Unsuccessful. Left msg for Meagan Mendiola to give us a call back at the office. A callback number was left.

## 2022-09-29 NOTE — TELEPHONE ENCOUNTER
----- Message from Kyle Melendez MD sent at 9/29/2022  1:43 PM EDT -----  Salivary cortisols  are negative - good inform pt

## 2023-05-11 ENCOUNTER — TRANSCRIBE ORDERS (OUTPATIENT)
Facility: HOSPITAL | Age: 73
End: 2023-05-11

## 2023-05-11 DIAGNOSIS — F17.210 CIGARETTE NICOTINE DEPENDENCE, UNCOMPLICATED: Primary | ICD-10-CM

## 2023-05-31 ENCOUNTER — HOSPITAL ENCOUNTER (OUTPATIENT)
Facility: HOSPITAL | Age: 73
Discharge: HOME | End: 2023-06-03
Payer: MEDICARE

## 2023-05-31 DIAGNOSIS — F17.210 CIGARETTE NICOTINE DEPENDENCE, UNCOMPLICATED: ICD-10-CM

## 2023-05-31 PROCEDURE — 71271 CT THORAX LUNG CANCER SCR C-: CPT

## 2024-05-04 ENCOUNTER — HOSPITAL ENCOUNTER (EMERGENCY)
Facility: HOSPITAL | Age: 74
Discharge: HOME | End: 2024-05-04
Attending: EMERGENCY MEDICINE
Payer: MEDICARE

## 2024-05-04 VITALS
RESPIRATION RATE: 20 BRPM | WEIGHT: 215 LBS | BODY MASS INDEX: 31.84 KG/M2 | OXYGEN SATURATION: 95 % | DIASTOLIC BLOOD PRESSURE: 78 MMHG | SYSTOLIC BLOOD PRESSURE: 143 MMHG | TEMPERATURE: 97.7 F | HEIGHT: 69 IN | HEART RATE: 79 BPM

## 2024-05-04 DIAGNOSIS — R31.9 HEMATURIA, UNSPECIFIED TYPE: Primary | ICD-10-CM

## 2024-05-04 DIAGNOSIS — N34.2 URETHRITIS: ICD-10-CM

## 2024-05-04 LAB
ABO + RH BLD: NORMAL
ALBUMIN SERPL-MCNC: 4 G/DL (ref 3.5–5)
ALBUMIN/GLOB SERPL: 0.9 (ref 1.1–2.2)
ALP SERPL-CCNC: 149 U/L (ref 45–117)
ALT SERPL-CCNC: 18 U/L (ref 12–78)
ANION GAP SERPL CALC-SCNC: 8 MMOL/L (ref 5–15)
APPEARANCE UR: ABNORMAL
AST SERPL W P-5'-P-CCNC: 18 U/L (ref 15–37)
BACTERIA URNS QL MICRO: NEGATIVE /HPF
BASOPHILS # BLD: 0 K/UL (ref 0–0.1)
BASOPHILS NFR BLD: 0 % (ref 0–1)
BILIRUB SERPL-MCNC: 0.6 MG/DL (ref 0.2–1)
BILIRUB UR QL: NEGATIVE
BLOOD GROUP ANTIBODIES SERPL: NEGATIVE
BUN SERPL-MCNC: 15 MG/DL (ref 6–20)
BUN/CREAT SERPL: 12 (ref 12–20)
CA-I BLD-MCNC: 10.4 MG/DL (ref 8.5–10.1)
CHLORIDE SERPL-SCNC: 107 MMOL/L (ref 97–108)
CO2 SERPL-SCNC: 25 MMOL/L (ref 21–32)
COLOR UR: ABNORMAL
CREAT SERPL-MCNC: 1.25 MG/DL (ref 0.7–1.3)
DIFFERENTIAL METHOD BLD: ABNORMAL
EOSINOPHIL # BLD: 0.1 K/UL (ref 0–0.4)
EOSINOPHIL NFR BLD: 1 % (ref 0–7)
EPITH CASTS URNS QL MICRO: ABNORMAL /LPF
ERYTHROCYTE [DISTWIDTH] IN BLOOD BY AUTOMATED COUNT: 12.9 % (ref 11.5–14.5)
GLOBULIN SER CALC-MCNC: 4.3 G/DL (ref 2–4)
GLUCOSE SERPL-MCNC: 121 MG/DL (ref 65–100)
GLUCOSE UR STRIP.AUTO-MCNC: 50 MG/DL
HCT VFR BLD AUTO: 45.6 % (ref 36.6–50.3)
HGB BLD-MCNC: 15.7 G/DL (ref 12.1–17)
HGB UR QL STRIP: ABNORMAL
IMM GRANULOCYTES # BLD AUTO: 0.1 K/UL (ref 0–0.04)
IMM GRANULOCYTES NFR BLD AUTO: 1 % (ref 0–0.5)
KETONES UR QL STRIP.AUTO: 5 MG/DL
LEUKOCYTE ESTERASE UR QL STRIP.AUTO: NEGATIVE
LYMPHOCYTES # BLD: 1.4 K/UL (ref 0.8–3.5)
LYMPHOCYTES NFR BLD: 14 % (ref 12–49)
MCH RBC QN AUTO: 33.5 PG (ref 26–34)
MCHC RBC AUTO-ENTMCNC: 34.4 G/DL (ref 30–36.5)
MCV RBC AUTO: 97.4 FL (ref 80–99)
MONOCYTES # BLD: 0.5 K/UL (ref 0–1)
MONOCYTES NFR BLD: 5 % (ref 5–13)
NEUTS SEG # BLD: 7.7 K/UL (ref 1.8–8)
NEUTS SEG NFR BLD: 79 % (ref 32–75)
NITRITE UR QL STRIP.AUTO: NEGATIVE
NRBC # BLD: 0 K/UL (ref 0–0.01)
NRBC BLD-RTO: 0 PER 100 WBC
PH UR STRIP: 7 (ref 5–8)
PLATELET # BLD AUTO: 211 K/UL (ref 150–400)
PMV BLD AUTO: 9.8 FL (ref 8.9–12.9)
POTASSIUM SERPL-SCNC: 3.8 MMOL/L (ref 3.5–5.1)
PROT SERPL-MCNC: 8.3 G/DL (ref 6.4–8.2)
PROT UR STRIP-MCNC: 100 MG/DL
RBC # BLD AUTO: 4.68 M/UL (ref 4.1–5.7)
RBC #/AREA URNS HPF: >100 /HPF (ref 0–5)
SODIUM SERPL-SCNC: 140 MMOL/L (ref 136–145)
SP GR UR REFRACTOMETRY: 1.02 (ref 1–1.03)
SPECIMEN EXP DATE BLD: NORMAL
UROBILINOGEN UR QL STRIP.AUTO: 0.1 EU/DL (ref 0.1–1)
WBC # BLD AUTO: 9.8 K/UL (ref 4.1–11.1)
WBC URNS QL MICRO: >100 /HPF (ref 0–4)

## 2024-05-04 PROCEDURE — 81001 URINALYSIS AUTO W/SCOPE: CPT

## 2024-05-04 PROCEDURE — 36415 COLL VENOUS BLD VENIPUNCTURE: CPT

## 2024-05-04 PROCEDURE — 86900 BLOOD TYPING SEROLOGIC ABO: CPT

## 2024-05-04 PROCEDURE — 85025 COMPLETE CBC W/AUTO DIFF WBC: CPT

## 2024-05-04 PROCEDURE — 6370000000 HC RX 637 (ALT 250 FOR IP): Performed by: EMERGENCY MEDICINE

## 2024-05-04 PROCEDURE — 87086 URINE CULTURE/COLONY COUNT: CPT

## 2024-05-04 PROCEDURE — 99283 EMERGENCY DEPT VISIT LOW MDM: CPT

## 2024-05-04 PROCEDURE — 86901 BLOOD TYPING SEROLOGIC RH(D): CPT

## 2024-05-04 PROCEDURE — 86850 RBC ANTIBODY SCREEN: CPT

## 2024-05-04 PROCEDURE — 80053 COMPREHEN METABOLIC PANEL: CPT

## 2024-05-04 RX ORDER — DOXYCYCLINE HYCLATE 100 MG/1
100 CAPSULE ORAL
Status: COMPLETED | OUTPATIENT
Start: 2024-05-04 | End: 2024-05-04

## 2024-05-04 RX ORDER — PHENAZOPYRIDINE HYDROCHLORIDE 100 MG/1
100 TABLET, FILM COATED ORAL 3 TIMES DAILY PRN
Qty: 9 TABLET | Refills: 0 | Status: SHIPPED | OUTPATIENT
Start: 2024-05-04 | End: 2024-05-07

## 2024-05-04 RX ORDER — DOXYCYCLINE HYCLATE 100 MG
100 TABLET ORAL 2 TIMES DAILY
Qty: 20 TABLET | Refills: 0 | Status: SHIPPED | OUTPATIENT
Start: 2024-05-04 | End: 2024-05-14

## 2024-05-04 RX ORDER — TAMSULOSIN HYDROCHLORIDE 0.4 MG/1
0.4 CAPSULE ORAL DAILY
Qty: 30 CAPSULE | Refills: 0 | Status: SHIPPED | OUTPATIENT
Start: 2024-05-04

## 2024-05-04 RX ORDER — PHENAZOPYRIDINE HYDROCHLORIDE 95 MG/1
95 TABLET ORAL
Status: COMPLETED | OUTPATIENT
Start: 2024-05-04 | End: 2024-05-04

## 2024-05-04 RX ADMIN — Medication 95 MG: at 21:43

## 2024-05-04 RX ADMIN — DOXYCYCLINE HYCLATE 100 MG: 100 CAPSULE ORAL at 21:43

## 2024-05-04 ASSESSMENT — LIFESTYLE VARIABLES
HOW OFTEN DO YOU HAVE A DRINK CONTAINING ALCOHOL: NEVER
HOW MANY STANDARD DRINKS CONTAINING ALCOHOL DO YOU HAVE ON A TYPICAL DAY: PATIENT DOES NOT DRINK

## 2024-05-04 ASSESSMENT — PAIN - FUNCTIONAL ASSESSMENT: PAIN_FUNCTIONAL_ASSESSMENT: 0-10

## 2024-05-04 ASSESSMENT — PAIN SCALES - GENERAL: PAINLEVEL_OUTOF10: 3

## 2024-05-04 NOTE — DISCHARGE INSTRUCTIONS
Thank you!  Thank you for allowing me to care for you in the emergency department. It is my goal to provide you with excellent care.  Please fill out the survey that will come to you by mail or email since we listen to your feedback!     Below you will find a list of your tests from today's visit.  Should you have any questions, please do not hesitate to call the emergency department.    Labs  Recent Results (from the past 12 hour(s))   CBC with Auto Differential    Collection Time: 05/04/24  5:48 PM   Result Value Ref Range    WBC 9.8 4.1 - 11.1 K/uL    RBC 4.68 4.10 - 5.70 M/uL    Hemoglobin 15.7 12.1 - 17.0 g/dL    Hematocrit 45.6 36.6 - 50.3 %    MCV 97.4 80.0 - 99.0 FL    MCH 33.5 26.0 - 34.0 PG    MCHC 34.4 30.0 - 36.5 g/dL    RDW 12.9 11.5 - 14.5 %    Platelets 211 150 - 400 K/uL    MPV 9.8 8.9 - 12.9 FL    Nucleated RBCs 0.0 0.0  WBC    nRBC 0.00 0.00 - 0.01 K/uL    Neutrophils % 79 (H) 32 - 75 %    Lymphocytes % 14 12 - 49 %    Monocytes % 5 5 - 13 %    Eosinophils % 1 0 - 7 %    Basophils % 0 0 - 1 %    Immature Granulocytes % 1 (H) 0 - 0.5 %    Neutrophils Absolute 7.7 1.8 - 8.0 K/UL    Lymphocytes Absolute 1.4 0.8 - 3.5 K/UL    Monocytes Absolute 0.5 0.0 - 1.0 K/UL    Eosinophils Absolute 0.1 0.0 - 0.4 K/UL    Basophils Absolute 0.0 0.0 - 0.1 K/UL    Immature Granulocytes Absolute 0.1 (H) 0.00 - 0.04 K/UL    Differential Type AUTOMATED     Comprehensive Metabolic Panel    Collection Time: 05/04/24  5:48 PM   Result Value Ref Range    Sodium 140 136 - 145 mmol/L    Potassium 3.8 3.5 - 5.1 mmol/L    Chloride 107 97 - 108 mmol/L    CO2 25 21 - 32 mmol/L    Anion Gap 8 5 - 15 mmol/L    Glucose 121 (H) 65 - 100 mg/dL    BUN 15 6 - 20 mg/dL    Creatinine 1.25 0.70 - 1.30 mg/dL    Bun/Cre Ratio 12 12 - 20      Est, Glom Filt Rate 60 (L) >60 ml/min/1.73m2    Calcium 10.4 (H) 8.5 - 10.1 mg/dL    Total Bilirubin 0.6 0.2 - 1.0 mg/dL    AST 18 15 - 37 U/L    ALT 18 12 - 78 U/L    Alk Phosphatase 149 (H)  provided, please have it filled as soon as possible to prevent a delay in treatment. If you have any questions or reservations about taking the medication due to side effects or interactions with other medications, please call your primary care provider or contact the ER.

## 2024-05-04 NOTE — ED TRIAGE NOTES
[facility] 15 pt's son stated that he has been having hematuria for a year that doesn't last and goes away; pt stated that he has been having worsening hematuria along with pain to his penis; pt just received a referral for a urologist this week

## 2024-05-05 NOTE — ED PROVIDER NOTES
[facility] EMERGENCY DEPT  EMERGENCY DEPARTMENT HISTORY AND PHYSICAL EXAM      Date: 5/4/2024  Patient Name: Misael Oneil  MRN: [id number]  Birthdate 1950  Date of evaluation: 5/4/2024  Provider: Liseth Deshpande MD   Note Started: 8:32 PM EDT 5/4/24    HISTORY OF PRESENT ILLNESS     Chief Complaint   Patient presents with    Hematuria       History Provided By: Patient    HPI: Misael Oneil is a 73 y.o. male patient is pain of hematuria and burning when he urinates no concern for STIs no abdominal pain or back pain.    PAST MEDICAL HISTORY   Past Medical History:  Past Medical History:   Diagnosis Date    Anxiety and depression     Back pain     pt states for several years    CAD (coronary artery disease)     pt states several MI's 1st 1998 and last MI 2008     COPD (chronic obstructive pulmonary disease) (HCC)     Diabetes (HCC)     pt states not currently on meds for diabetes     Elevated cholesterol     GERD (gastroesophageal reflux disease)     Hip pain, right     Hypertension     Lung nodule     SILVINA (obstructive sleep apnea)     pt states not using CPAP    Sciatica        Past Surgical History:  Past Surgical History:   Procedure Laterality Date    ANGIOPLASTY      BACK SURGERY  02/19/2020    lower back    CARDIAC CATHETERIZATION      pt states has stents in heart , pt states unsure how many    CHOLECYSTECTOMY      COLONOSCOPY  02/2016    REFRACTIVE SURGERY  12/2016       Family History:  Family History   Problem Relation Age of Onset    Hypertension Son     Diabetes Father     Cancer Father     Parkinson's Disease Mother        Social History:  Social History     Tobacco Use    Smoking status: Every Day     Current packs/day: 1.00     Types: Cigarettes    Smokeless tobacco: Never   Substance Use Topics    Alcohol use: Yes    Drug use: No       Allergies:  Allergies   Allergen Reactions    Hydrochlorothiazide Other (See Comments)     kidney problem    Lisinopril Cough       PCP: Ruth Kenny MD    Current

## 2024-05-06 LAB
BACTERIA SPEC CULT: NORMAL
COLONY COUNT, CNT: NORMAL
COLONY COUNT, CNT: NORMAL
Lab: NORMAL

## 2024-08-01 ENCOUNTER — HOSPITAL ENCOUNTER (OUTPATIENT)
Facility: HOSPITAL | Age: 74
Discharge: HOME | End: 2024-08-01
Payer: MEDICARE

## 2024-08-01 DIAGNOSIS — F17.200 CURRENT SMOKER: ICD-10-CM

## 2024-08-01 DIAGNOSIS — Z87.891 PERSONAL HISTORY OF TOBACCO USE: ICD-10-CM

## 2024-08-01 PROCEDURE — 71271 CT THORAX LUNG CANCER SCR C-: CPT

## 2024-08-14 ENCOUNTER — TRANSCRIBE ORDERS (OUTPATIENT)
Facility: HOSPITAL | Age: 74
End: 2024-08-14

## 2024-08-14 DIAGNOSIS — R31.9 HEMATURIA SYNDROME: Primary | ICD-10-CM

## 2024-08-29 ENCOUNTER — OFFICE VISIT (OUTPATIENT)
Age: 74
End: 2024-08-29
Payer: MEDICARE

## 2024-08-29 ENCOUNTER — PROCEDURE VISIT (OUTPATIENT)
Age: 74
End: 2024-08-29
Payer: MEDICARE

## 2024-08-29 VITALS — SYSTOLIC BLOOD PRESSURE: 132 MMHG | DIASTOLIC BLOOD PRESSURE: 82 MMHG | HEART RATE: 67 BPM | OXYGEN SATURATION: 97 %

## 2024-08-29 DIAGNOSIS — H90.6 MIXED CONDUCTIVE AND SENSORINEURAL HEARING LOSS OF BOTH EARS: Primary | ICD-10-CM

## 2024-08-29 DIAGNOSIS — H90.3 SENSORINEURAL HEARING LOSS (SNHL) OF BOTH EARS: Primary | ICD-10-CM

## 2024-08-29 PROCEDURE — 1123F ACP DISCUSS/DSCN MKR DOCD: CPT | Performed by: STUDENT IN AN ORGANIZED HEALTH CARE EDUCATION/TRAINING PROGRAM

## 2024-08-29 PROCEDURE — 92567 TYMPANOMETRY: CPT

## 2024-08-29 PROCEDURE — 3075F SYST BP GE 130 - 139MM HG: CPT | Performed by: STUDENT IN AN ORGANIZED HEALTH CARE EDUCATION/TRAINING PROGRAM

## 2024-08-29 PROCEDURE — 92557 COMPREHENSIVE HEARING TEST: CPT

## 2024-08-29 PROCEDURE — 99203 OFFICE O/P NEW LOW 30 MIN: CPT | Performed by: STUDENT IN AN ORGANIZED HEALTH CARE EDUCATION/TRAINING PROGRAM

## 2024-08-29 PROCEDURE — 3079F DIAST BP 80-89 MM HG: CPT | Performed by: STUDENT IN AN ORGANIZED HEALTH CARE EDUCATION/TRAINING PROGRAM

## 2024-08-29 NOTE — PROGRESS NOTES
Misael Oneil   1950, 74 y.o. male   [id number]       Referring Provider: Dawna Bee MD  Referral Type: In an order in Epic    Reason for Visit: Evaluation of the cause of disorders of hearing, tinnitus, or balance.    ADULT AUDIOLOGIC EVALUATION      Misael Oneil is a 74 y.o. male seen today, 8/29/2024 , for an initial audiologic evaluation.  Patient was seen by Dawna Bee MD following today's evaluation.    AUDIOLOGIC AND OTHER PERTINENT MEDICAL HISTORY:      Misael Oneil reports bilateral hearing loss for 3 years. He notes his right ear is poorer than his left ear. He indicates he had a lot of loud noise exposure many years ago.     He denied otalgia, aural fullness, otorrhea, and family history of hearing loss    Date: 8/29/2024     IMPRESSIONS:      Today's results revealed moderate sloping to moderately-severe/severe mixed hearing loss. Poor speech understanding when in quiet in the right ear and good in the left ear. Tympanometry indicates normal middle ear function. Discussed test results and implications with patient. Discussed possible benefits of amplification.  Hearing aids recommended at this time. Discussed noise exposure and the importance of appropriate hearing protection when in hazardous noise environments.    Follow medical recommendations of Dawna Bee MD.     ASSESSMENT AND FINDINGS:     Otoscopy unremarkable.    RIGHT EAR:  Hearing Sensitivity: Mild sloping to severe mixed hearing loss. 20 dB conductive component at 4000 Hz.  Speech Recognition Threshold: 60 dB HL  Word Recognition: Poor 60%, based on NU-6 25-word list at 90 dBHL using recorded speech stimuli.    Tympanometry: Normal peak pressure and compliance, Type A tympanogram, consistent with normal middle ear function.       LEFT EAR:  Hearing Sensitivity: Mild sloping to moderately-severe mixed hearing loss. 15 dB conductive component at 250 and 4000 Hz  Speech Recognition Threshold: 60 dB HL  Word Recognition:  Good 80%, based on NU-6 25-word list at 90 dBHL using recorded speech stimuli.    Tympanometry: Normal peak pressure and compliance, Type A tympanogram, consistent with normal middle ear function.       Reliability: Good   Transducer: Headphones    See scanned audiogram dated 8/29/2024 for results.        PATIENT EDUCATION:       The following items were discussed with the patient:   - Good Communication Strategies  - Hearing Loss and Hearing Aids  - Auditory Deprivation     Educational information was shared in the After Visit Summary.                                                RECOMMENDATIONS:                                                                                                                                                                                                                                                            The following items are recommended based on patient report and results from today's appointment:   - Continue medical follow-up with Dawna Bee MD.    - Retest hearing as medically indicated and/or sooner if a change in hearing is noted.  - If desired, schedule a Hearing Aid Evaluation (HAE) appointment to discuss hearing aid options.  - Utilize \"Good Communication Strategies\" as discussed to assist in speech understanding with communication partners.    - Hearing aids recommended at this time      Ila Barker  Audiologist     Chart CC'd to: Dawna Bee MD        Degree of   Hearing Sensitivity dB Range   Within Normal Limits (WNL) 0 - 25   Mild 25 - 40   Moderate 40 - 55   Moderately-Severe 55 - 70   Severe 70 - 90   Profound 90 +

## 2024-08-29 NOTE — PROGRESS NOTES
Subjective:   Misael Oneil   74 y.o.   1950     Refered by: Ruth Kenny Md  [address] [place] [address]     New Patient Visit  Chief Compliant: hearing loss    History of Present Illness:  Misael Oneil is a 74 y.o. male with past medical history of CAD, HTN, COPD, SILVINA, DM, who was referred from Dr. Ruth Kenny, for evaluation of hearing loss.     Patient reports bilateral tinnitus and hearing loss, progressive over several years.  Endorses significant loud noise exposure.  Denies otorrhea, otalgia, or vertigo.     Recent ED visit and primary care notes reviewed.    Audiogram completed and independently reviewed. Shows moderate sloping to moderate severe hearing loss.  Word recognition score poor in the right side.      Review of Systems  Consitutional: denies fever, excessive weight gain or loss.  Eyes: denies diplopia, eye pain.  Integumentary: denies new concerning skin lesions.  Ears, Nose, Mouth, Throat: denies except as per HPI.  Endocrine: denies hot or cold intolerance, increased thirst.  Respiratory: denies cough, hemoptysis, wheezing  Gastrointestinal: denies trouble swallowing, nausea, emesis, regurgitation  Musculoskeletal: denies muscle weakness or wasting  Cardiovascular: denies chest pain, shortness of breath  Neurologic: denies seizures, numbness or tingling, syncope  Hematologic: denies easy bleeding or bruising       Past Medical History:   Diagnosis Date    Anxiety and depression     Back pain     pt states for several years    CAD (coronary artery disease)     pt states several MI's 1st 1998 and last MI 2008     COPD (chronic obstructive pulmonary disease) (HCC)     Diabetes (HCC)     pt states not currently on meds for diabetes     Elevated cholesterol     GERD (gastroesophageal reflux disease)     Hip pain, right     Hypertension     Lung nodule     SILVINA (obstructive sleep apnea)     pt states not using CPAP    Sciatica      Past Surgical History:   Procedure

## 2024-08-29 NOTE — PATIENT INSTRUCTIONS
tingling, weakness, or loss of movement in your face, arm, or leg, especially on only one side of your body.  Sudden vision changes.  Sudden trouble speaking.  Sudden confusion or trouble understanding simple statements.  Sudden problems with walking or balance.  A sudden, severe headache that is different from past headaches.    Call your doctor now or seek immediate medical care if:    You develop other symptoms. These may include hearing loss (or worse hearing loss), balance problems, dizziness, nausea, or vomiting.    Watch closely for changes in your health, and be sure to contact your doctor if:    Your tinnitus moves from both ears to one ear.     Your hearing loss gets worse within 1 day after an ear injury.     Your tinnitus or hearing loss does not get better within 1 week after an ear injury.     Your tinnitus bothers you enough that you want to take medicines to help you cope with it.      If you notice changes in your tinnitus and/or your hearing, it is recommended that you have your hearing tested by your audiologist and to follow-up with your physician that manages your hearing loss (such as your ENT or Primary Care doctor).         Good Communication Strategies    Communication can be challenging for anyone, but can be especially difficult for those with some degree of hearing loss.  While we may not be able to control every factor that may lead to difficulty with communication, there are Good Communication Strategies that we can all use in our day-to-day lives.  Communication takes both parties working together for it to be successful.    Tips as a Listener:   Control your environment.  It is important to limit the amount of background noise in the room when possible.  You should also consider having a good light source in the room to best see the other person.  Ask for clarification.  Instead of saying \"What?\", you can use parts of what you heard to make a new question.  For example, if you heard  the word \"Thursday\" but not the rest of the week, you may ask \"What was that about Thursday?\" or \"What did you want to do Thursday?\".  This shows the person talking that you are listening and will help them better explain what they are saying.  Be an advocate for yourself.  If you are hearing but not understanding, tell the other person \"I can hear you, but I need you to slow down when you speak.\"  Or if someone is facing the other direction, say \"I cannot hear you when you are not looking at me when we talk.\"       Tips as a Talker:   - Sit or stand 3 to 6 feet away to maximize audibility         -- It is unrealistic to believe someone else will fully hear your message if you are speaking from across the room or in a different room in the house   - Stay at eye level to help with visual cues   - Make sure you have the person’s attention before speaking   - Use facial expressions and gestures to accentuate your message   - Raise your voice slightly (do not scream)   - Speak slowly and distinctly   - Use short, simple sentences   - Rephrase your words if the person is having a hard time understanding you    - To avoid distortion, don’t speak directly into a person’s ear      Some additional items that may be helpful:   - Remain patient - this is important for both parties   - Write down items that still cannot be heard/understood.  You may write with pen/paper or consider typing/texting on a cell phone or smart device.   - If background noise is unavoidable, try to keep yourself in a good position in the room.  By sitting at a burns on the side of the restaurant (preferably a corner), it will be easier to communicate than if you were sitting at a table in the middle with background noise surrounding you.  Keep yourself positioned away from music speakers or heavy foot traffic.

## 2024-09-04 ENCOUNTER — HOSPITAL ENCOUNTER (OUTPATIENT)
Facility: HOSPITAL | Age: 74
Discharge: HOME | End: 2024-09-07
Attending: UROLOGY
Payer: MEDICARE

## 2024-09-04 DIAGNOSIS — R31.9 HEMATURIA SYNDROME: ICD-10-CM

## 2024-09-04 LAB — CREAT BLD-MCNC: 1.2 MG/DL (ref 0.6–1.3)

## 2024-09-04 PROCEDURE — 74178 CT ABD&PLV WO CNTR FLWD CNTR: CPT

## 2024-09-04 PROCEDURE — 82565 ASSAY OF CREATININE: CPT

## 2024-09-04 PROCEDURE — 6360000004 HC RX CONTRAST MEDICATION: Performed by: UROLOGY

## 2024-09-04 RX ORDER — IOPAMIDOL 755 MG/ML
100 INJECTION, SOLUTION INTRAVASCULAR
Status: COMPLETED | OUTPATIENT
Start: 2024-09-04 | End: 2024-09-04

## 2024-09-04 RX ADMIN — IOPAMIDOL 100 ML: 755 INJECTION, SOLUTION INTRAVENOUS at 15:28

## 2024-09-24 ENCOUNTER — PROCEDURE VISIT (OUTPATIENT)
Age: 74
End: 2024-09-24

## 2024-09-24 DIAGNOSIS — H90.6 MIXED HEARING LOSS, BILATERAL: Primary | ICD-10-CM

## 2024-09-25 ENCOUNTER — OFFICE VISIT (OUTPATIENT)
Age: 74
End: 2024-09-25
Payer: MEDICARE

## 2024-09-25 VITALS
WEIGHT: 224.2 LBS | TEMPERATURE: 98.3 F | SYSTOLIC BLOOD PRESSURE: 122 MMHG | OXYGEN SATURATION: 93 % | HEIGHT: 68 IN | HEART RATE: 57 BPM | DIASTOLIC BLOOD PRESSURE: 54 MMHG | BODY MASS INDEX: 33.98 KG/M2

## 2024-09-25 DIAGNOSIS — D35.02 ADRENAL ADENOMA, LEFT: Primary | ICD-10-CM

## 2024-09-25 DIAGNOSIS — I10 ESSENTIAL HYPERTENSION: ICD-10-CM

## 2024-09-25 PROCEDURE — G2211 COMPLEX E/M VISIT ADD ON: HCPCS | Performed by: INTERNAL MEDICINE

## 2024-09-25 PROCEDURE — 3074F SYST BP LT 130 MM HG: CPT | Performed by: INTERNAL MEDICINE

## 2024-09-25 PROCEDURE — 99214 OFFICE O/P EST MOD 30 MIN: CPT | Performed by: INTERNAL MEDICINE

## 2024-09-25 PROCEDURE — 3078F DIAST BP <80 MM HG: CPT | Performed by: INTERNAL MEDICINE

## 2024-09-25 PROCEDURE — 1123F ACP DISCUSS/DSCN MKR DOCD: CPT | Performed by: INTERNAL MEDICINE

## 2024-09-25 RX ORDER — DEXAMETHASONE 1 MG
TABLET ORAL
Qty: 1 TABLET | Refills: 0 | Status: SHIPPED | OUTPATIENT
Start: 2024-09-25

## 2024-10-14 ENCOUNTER — LAB (OUTPATIENT)
Age: 74
End: 2024-10-14

## 2024-10-14 DIAGNOSIS — D35.02 ADRENAL ADENOMA, LEFT: ICD-10-CM

## 2024-10-14 DIAGNOSIS — D35.02 ADRENAL ADENOMA, LEFT: Primary | ICD-10-CM

## 2024-10-15 LAB — CORTIS AM PEAK SERPL-MCNC: 1.8 UG/DL (ref 6.2–19.4)

## 2024-10-17 ENCOUNTER — HOSPITAL ENCOUNTER (OUTPATIENT)
Facility: HOSPITAL | Age: 74
Discharge: HOME | End: 2024-10-20
Payer: MEDICARE

## 2024-10-17 VITALS
OXYGEN SATURATION: 97 % | SYSTOLIC BLOOD PRESSURE: 126 MMHG | DIASTOLIC BLOOD PRESSURE: 54 MMHG | HEIGHT: 68 IN | TEMPERATURE: 98 F | RESPIRATION RATE: 14 BRPM | WEIGHT: 224 LBS | HEART RATE: 58 BPM | BODY MASS INDEX: 33.95 KG/M2

## 2024-10-17 LAB
ALBUMIN SERPL-MCNC: 3.7 G/DL (ref 3.5–5)
ALBUMIN/GLOB SERPL: 1.1 (ref 1.1–2.2)
ALP SERPL-CCNC: 135 U/L (ref 45–117)
ALT SERPL-CCNC: 16 U/L (ref 12–78)
ANION GAP SERPL CALC-SCNC: 2 MMOL/L (ref 2–12)
APPEARANCE UR: CLEAR
AST SERPL-CCNC: 20 U/L (ref 15–37)
BACTERIA URNS QL MICRO: NEGATIVE /HPF
BASOPHILS # BLD: 0 K/UL (ref 0–0.1)
BASOPHILS NFR BLD: 1 % (ref 0–1)
BILIRUB SERPL-MCNC: 0.5 MG/DL (ref 0.2–1)
BILIRUB UR QL: NEGATIVE
BUN SERPL-MCNC: 11 MG/DL (ref 6–20)
BUN/CREAT SERPL: 9 (ref 12–20)
CALCIUM SERPL-MCNC: 10 MG/DL (ref 8.5–10.1)
CHLORIDE SERPL-SCNC: 107 MMOL/L (ref 97–108)
CO2 SERPL-SCNC: 30 MMOL/L (ref 21–32)
COLOR UR: ABNORMAL
CREAT SERPL-MCNC: 1.17 MG/DL (ref 0.7–1.3)
DIFFERENTIAL METHOD BLD: ABNORMAL
EOSINOPHIL # BLD: 0.2 K/UL (ref 0–0.4)
EOSINOPHIL NFR BLD: 3 % (ref 0–7)
EPITH CASTS URNS QL MICRO: ABNORMAL /LPF
ERYTHROCYTE [DISTWIDTH] IN BLOOD BY AUTOMATED COUNT: 13.4 % (ref 11.5–14.5)
GLOBULIN SER CALC-MCNC: 3.3 G/DL (ref 2–4)
GLUCOSE SERPL-MCNC: 108 MG/DL (ref 65–100)
GLUCOSE UR STRIP.AUTO-MCNC: NEGATIVE MG/DL
HCT VFR BLD AUTO: 42.5 % (ref 36.6–50.3)
HGB BLD-MCNC: 14.5 G/DL (ref 12.1–17)
HGB UR QL STRIP: NEGATIVE
HYALINE CASTS URNS QL MICRO: ABNORMAL /LPF (ref 0–2)
IMM GRANULOCYTES # BLD AUTO: 0 K/UL (ref 0–0.04)
IMM GRANULOCYTES NFR BLD AUTO: 0 % (ref 0–0.5)
KETONES UR QL STRIP.AUTO: NEGATIVE MG/DL
LEUKOCYTE ESTERASE UR QL STRIP.AUTO: NEGATIVE
LYMPHOCYTES # BLD: 2 K/UL (ref 0.8–3.5)
LYMPHOCYTES NFR BLD: 29 % (ref 12–49)
MCH RBC QN AUTO: 34.2 PG (ref 26–34)
MCHC RBC AUTO-ENTMCNC: 34.1 G/DL (ref 30–36.5)
MCV RBC AUTO: 100.2 FL (ref 80–99)
MONOCYTES # BLD: 0.5 K/UL (ref 0–1)
MONOCYTES NFR BLD: 7 % (ref 5–13)
NEUTS SEG # BLD: 4.1 K/UL (ref 1.8–8)
NEUTS SEG NFR BLD: 60 % (ref 32–75)
NITRITE UR QL STRIP.AUTO: NEGATIVE
NRBC # BLD: 0 K/UL (ref 0–0.01)
NRBC BLD-RTO: 0 PER 100 WBC
PH UR STRIP: 7 (ref 5–8)
PLATELET # BLD AUTO: 207 K/UL (ref 150–400)
PMV BLD AUTO: 10.4 FL (ref 8.9–12.9)
POTASSIUM SERPL-SCNC: 4.3 MMOL/L (ref 3.5–5.1)
PROT SERPL-MCNC: 7 G/DL (ref 6.4–8.2)
PROT UR STRIP-MCNC: NEGATIVE MG/DL
RBC # BLD AUTO: 4.24 M/UL (ref 4.1–5.7)
RBC #/AREA URNS HPF: ABNORMAL /HPF (ref 0–5)
SODIUM SERPL-SCNC: 139 MMOL/L (ref 136–145)
SP GR UR REFRACTOMETRY: 1.01 (ref 1–1.03)
URINE CULTURE IF INDICATED: ABNORMAL
UROBILINOGEN UR QL STRIP.AUTO: 2 EU/DL (ref 0.2–1)
WBC # BLD AUTO: 6.8 K/UL (ref 4.1–11.1)
WBC URNS QL MICRO: ABNORMAL /HPF (ref 0–4)

## 2024-10-17 PROCEDURE — 36415 COLL VENOUS BLD VENIPUNCTURE: CPT

## 2024-10-17 PROCEDURE — 85025 COMPLETE CBC W/AUTO DIFF WBC: CPT

## 2024-10-17 PROCEDURE — 93005 ELECTROCARDIOGRAM TRACING: CPT | Performed by: UROLOGY

## 2024-10-17 PROCEDURE — 80053 COMPREHEN METABOLIC PANEL: CPT

## 2024-10-17 PROCEDURE — 81001 URINALYSIS AUTO W/SCOPE: CPT

## 2024-10-17 RX ORDER — POLYETHYLENE GLYCOL 3350 17 G/17G
17 POWDER, FOR SOLUTION ORAL DAILY PRN
COMMUNITY

## 2024-10-17 RX ORDER — AMLODIPINE BESYLATE 5 MG/1
5 TABLET ORAL NIGHTLY
COMMUNITY

## 2024-10-17 RX ORDER — HYDRALAZINE HYDROCHLORIDE 50 MG/1
50 TABLET, FILM COATED ORAL 3 TIMES DAILY
COMMUNITY

## 2024-10-17 NOTE — DISCHARGE INSTRUCTIONS
[facility]                   [address] [place] [address]   MAIN OR                                  [phone]   MAIN PRE OP                          [phone]                                                                                AMBULATORY PRE OP          [phone]  PRE-ADMISSION TESTING    [phone]     Surgery Date:  October 21 (Monday)      Is surgery arrival time given by surgeon?    NO  If “NO”, [facility] staff will call you between 3 and 7pm the day before your surgery with your arrival time. (If your surgery is on a Monday, we will call you the Friday before.)    Call [phone] after 7pm Monday-Friday if you did not receive this call.    INSTRUCTIONS BEFORE YOUR SURGERY   When You  Arrive Arrive at the 2nd Floor Admitting Desk on the day of your surgery  Have your insurance card, photo ID, and any copayment (if needed)   Food   and   Drink NO food or drink after midnight the night before surgery    This means NO water, gum, mints, coffee, juice, etc.  No alcohol (beer, wine, liquor) 24 hours before and after surgery   Medications to   TAKE   Morning of Surgery MEDICATIONS TO TAKE THE MORNING OF SURGERY WITH A SIP OF WATER:     Pantoprazole  Fluoxetine  Hydralazine  Inhalers as needed    Do not take morning of surgery or night before - Lisinopril    Do not take morning of surgery - Miralax     Medications  To  STOP      7 days before surgery Non-Steroidal anti-inflammatory Drugs (NSAID's): for example, Ibuprofen (Advil, Motrin), Naproxen (Aleve)  Aspirin, if taking for pain   Herbal supplements, vitamins, and fish oil  Other:  (Pain medications not listed above, including Tylenol may be taken)   Blood  Thinners Aspirin - Hold 5-7 days before procedure.  Plavix - Hold 5-7 days before procedure.  Contact Dr. Abdi for directions.   Bathing Clothing  Jewelry  Valuables     If you shower the morning of surgery, please do not apply anything to  your skin (lotions, powders, deodorant, or makeup, especially mascara)  Begin bathing with antibacterial soap 3 days prior to surgery.  Do not shave or trim anywhere 24 hours before surgery  Wear your hair loose or down; no pony-tails, buns, or metal hair clips  Wear loose, comfortable, clean clothes  Wear glasses instead of contacts  Leave money, valuables, and jewelry, including body piercings, at home     Going Home - or Spending the Night SAME-DAY SURGERY: You must have a responsible adult drive you home and stay with you 24 hours after surgery  ADMITS: If your doctor is keeping you in the hospital after surgery, leave personal belongings/luggage in your car until you have a hospital room number.    Hospital discharge time is 12 noon  Drivers must be here before 12 noon unless you are told differently   Special Instructions        Follow all instructions so your surgery won’t be cancelled.  Please, be on time.                    If a situation occurs and you are delayed the day of surgery, call [phone].    If your physical condition changes (like a fever, cold, flu, etc.) call your surgeon.    Home medication(s) reviewed and verified via   LIST   VERBAL   during PAT appointment.    The patient was contacted by    IN-PERSON  The patient verbalizes understanding of all instructions and   DOES NOT   need reinforcement.

## 2024-10-18 LAB
CORTIS BS SAL-MCNC: 0.13 UG/DL
CORTIS SP1 P CHAL SAL-MCNC: 0.1 UG/DL

## 2024-10-18 NOTE — PERIOP NOTE
Pre-op H&P received from  [facility] requesting pulmonary and cardiac clearances prior to surgery. Surgery is scheduled for Monday 10/21/2024. Message left for Dr. Abdi's office.

## 2024-10-19 LAB
EKG ATRIAL RATE: 54 BPM
EKG DIAGNOSIS: NORMAL
EKG P AXIS: 33 DEGREES
EKG P-R INTERVAL: 312 MS
EKG Q-T INTERVAL: 456 MS
EKG QRS DURATION: 94 MS
EKG QTC CALCULATION (BAZETT): 432 MS
EKG R AXIS: 55 DEGREES
EKG T AXIS: 46 DEGREES
EKG VENTRICULAR RATE: 54 BPM

## 2024-10-19 PROCEDURE — 93010 ELECTROCARDIOGRAM REPORT: CPT | Performed by: SPECIALIST

## 2024-10-19 NOTE — RESULT ENCOUNTER NOTE
Inform patient he has mildly abnormal cortisol, however he does not have symptoms of extreme weight gain, severe diabetes and hence taking the adrenal gland might not really help him.  In the future if he notices extreme weight gain, difficult to control diabetes or hypertension as discussed during the office visit to return then we have to consider taking the adrenal gland out.  Mild abnormal cortisol can be seen with stress, sleep apnea    Please fax this result note to PCP

## 2024-10-21 ENCOUNTER — TELEPHONE (OUTPATIENT)
Age: 74
End: 2024-10-21

## 2024-10-21 NOTE — TELEPHONE ENCOUNTER
----- Message from Dr. Pat Lemos MD sent at 10/19/2024  5:12 PM EDT -----  Inform patient he has mildly abnormal cortisol, however he does not have symptoms of extreme weight gain, severe diabetes and hence taking the adrenal gland might not really help him.  In the future if he notices extreme weight gain, difficult to control diabetes or hypertension as discussed during the office visit to return then we have to consider taking the adrenal gland out.  Mild abnormal cortisol can be seen with stress, sleep apnea    Please fax this result note to PCP

## 2024-10-21 NOTE — TELEPHONE ENCOUNTER
Per Dr. Lemos, informed pt's daughter Renetta (on HIPAA) of result note, as noted above. She verbalized understanding with no further questions or concerns at this time.  Results faxed to PCP as well.

## 2024-10-26 LAB — DEXAMETHASONE SERPL-MCNC: 230 NG/DL

## 2025-04-15 NOTE — H&P
This 74 y.o. year old male presents with urinary complaints secondary to bladder cancer.          Conservative measures including medication management, activity modification, physical therapy and injection therapy have been exhausted prior to the decision for surgery. Patient has discussed the risks, alternatives, and benefits of the surgery with surgeon and has elected to proceed with surgical intervention.    Type of surgery : Procedure(s) (LRB):  TRANSURETHRAL RESECTION OF BLADDER (N/A)  Date of procedure:  5/8/25  Surgeon: Primary: Jer Abdi II, MD     PCP: Ruth Kenny MD   Specialists: Dr. Getachew Blandon (Cardiology), Dr. Gutierrez (Electrophysiology), Dr. Ramirez (Vascular), Dr. Nelson (Pulmonology), Dr. Lemos (Endocrinology),       LMP (If applicable): N/A      Hx of Psychological Disorders: No    Hx of Seizure: No      Hx of Cancer: Yes    Hx of Autoimmune Disorders: No    Hx of Anemias: No    Personal Hx of Blood Clotting Disorder/DVT/PE: No    Family Hx of Blood Clotting Disorder/DVT/PE: No    Hx of Blood Transfusions: Yes    Hx of Hormone Disorders: No    Implantable Devices: No     AICD/Pacemaker (If so, has it been interrogated and/or battery change within the last 6 months?)? No      Coronary Artery Stent Placement within last 6 months? No       Hx of Rheumatic Fever/HTN/HLD/CAD/CHF/Cardiomyopathy/Arrhythmia/Heart Valve Abnormalities/Aneurysm: Yes    Hx of TB/Asthma/COPD/SILVINA: Yes    Known Kidney Disease (including dialysis recipient)? No      Frequent UTIs: No     Abx used in the past: N/A    Requires Diflucan after abx: No     History of MRSA/MSSA: No    Recent ED/Hospitalizations/Urgent Care visits: No        Acute exacerbations/Infections: Yes    On 3/20, saw Dr. Florentino.     EKG then showed NSR with 1st AV Block. 2-week Holter Monitor ordered (do not currently have results).     Dr. Florentino cleared the patient to proceed with Transurethral Resection of Bladder.     Of note, the

## 2025-04-21 NOTE — PERIOP NOTE
S/w Marcy at Dr. Abdi's office requesting pre-op order to be faxed to PAT prior to appt tomorrow.  DOS 5/8/2025.

## 2025-04-22 ENCOUNTER — HOSPITAL ENCOUNTER (OUTPATIENT)
Facility: HOSPITAL | Age: 75
Discharge: HOME | End: 2025-04-25
Payer: MEDICARE

## 2025-04-22 VITALS
WEIGHT: 222 LBS | HEIGHT: 68 IN | SYSTOLIC BLOOD PRESSURE: 112 MMHG | DIASTOLIC BLOOD PRESSURE: 57 MMHG | RESPIRATION RATE: 16 BRPM | BODY MASS INDEX: 33.65 KG/M2 | HEART RATE: 50 BPM | TEMPERATURE: 97.5 F | OXYGEN SATURATION: 97 %

## 2025-04-22 LAB
ALBUMIN SERPL-MCNC: 3.8 G/DL (ref 3.5–5)
ALBUMIN/GLOB SERPL: 1.2 (ref 1.1–2.2)
ALP SERPL-CCNC: 134 U/L (ref 45–117)
ALT SERPL-CCNC: 22 U/L (ref 12–78)
ANION GAP SERPL CALC-SCNC: 5 MMOL/L (ref 2–12)
APPEARANCE UR: CLEAR
AST SERPL-CCNC: 22 U/L (ref 15–37)
BACTERIA URNS QL MICRO: NEGATIVE /HPF
BASOPHILS # BLD: 0.03 K/UL (ref 0–0.1)
BASOPHILS NFR BLD: 0.2 % (ref 0–1)
BILIRUB SERPL-MCNC: 0.4 MG/DL (ref 0.2–1)
BILIRUB UR QL: NEGATIVE
BUN SERPL-MCNC: 23 MG/DL (ref 6–20)
BUN/CREAT SERPL: 18 (ref 12–20)
CALCIUM SERPL-MCNC: 9.7 MG/DL (ref 8.5–10.1)
CHLORIDE SERPL-SCNC: 107 MMOL/L (ref 97–108)
CO2 SERPL-SCNC: 26 MMOL/L (ref 21–32)
COLOR UR: ABNORMAL
CREAT SERPL-MCNC: 1.27 MG/DL (ref 0.7–1.3)
DIFFERENTIAL METHOD BLD: ABNORMAL
EOSINOPHIL # BLD: 0.01 K/UL (ref 0–0.4)
EOSINOPHIL NFR BLD: 0.1 % (ref 0–7)
EPITH CASTS URNS QL MICRO: ABNORMAL /LPF
ERYTHROCYTE [DISTWIDTH] IN BLOOD BY AUTOMATED COUNT: 13.8 % (ref 11.5–14.5)
GLOBULIN SER CALC-MCNC: 3.3 G/DL (ref 2–4)
GLUCOSE SERPL-MCNC: 85 MG/DL (ref 65–100)
GLUCOSE UR STRIP.AUTO-MCNC: NEGATIVE MG/DL
HCT VFR BLD AUTO: 41.4 % (ref 36.6–50.3)
HGB BLD-MCNC: 13.8 G/DL (ref 12.1–17)
HGB UR QL STRIP: NEGATIVE
IMM GRANULOCYTES # BLD AUTO: 0.08 K/UL (ref 0–0.04)
IMM GRANULOCYTES NFR BLD AUTO: 0.6 % (ref 0–0.5)
KETONES UR QL STRIP.AUTO: ABNORMAL MG/DL
LEUKOCYTE ESTERASE UR QL STRIP.AUTO: ABNORMAL
LYMPHOCYTES # BLD: 2.28 K/UL (ref 0.8–3.5)
LYMPHOCYTES NFR BLD: 16.5 % (ref 12–49)
MCH RBC QN AUTO: 32.9 PG (ref 26–34)
MCHC RBC AUTO-ENTMCNC: 33.3 G/DL (ref 30–36.5)
MCV RBC AUTO: 98.8 FL (ref 80–99)
MONOCYTES # BLD: 0.67 K/UL (ref 0–1)
MONOCYTES NFR BLD: 4.9 % (ref 5–13)
NEUTS SEG # BLD: 10.74 K/UL (ref 1.8–8)
NEUTS SEG NFR BLD: 77.7 % (ref 32–75)
NITRITE UR QL STRIP.AUTO: NEGATIVE
NRBC # BLD: 0 K/UL (ref 0–0.01)
NRBC BLD-RTO: 0 PER 100 WBC
PH UR STRIP: 5.5 (ref 5–8)
PLATELET # BLD AUTO: 209 K/UL (ref 150–400)
PMV BLD AUTO: 10.3 FL (ref 8.9–12.9)
POTASSIUM SERPL-SCNC: 3.8 MMOL/L (ref 3.5–5.1)
PROT SERPL-MCNC: 7.1 G/DL (ref 6.4–8.2)
PROT UR STRIP-MCNC: ABNORMAL MG/DL
RBC # BLD AUTO: 4.19 M/UL (ref 4.1–5.7)
RBC #/AREA URNS HPF: ABNORMAL /HPF (ref 0–5)
SODIUM SERPL-SCNC: 138 MMOL/L (ref 136–145)
SP GR UR REFRACTOMETRY: 1.02 (ref 1–1.03)
URINE CULTURE IF INDICATED: ABNORMAL
UROBILINOGEN UR QL STRIP.AUTO: 1 EU/DL (ref 0.2–1)
WBC # BLD AUTO: 13.8 K/UL (ref 4.1–11.1)
WBC URNS QL MICRO: ABNORMAL /HPF (ref 0–4)

## 2025-04-22 PROCEDURE — 36415 COLL VENOUS BLD VENIPUNCTURE: CPT

## 2025-04-22 PROCEDURE — 85025 COMPLETE CBC W/AUTO DIFF WBC: CPT

## 2025-04-22 PROCEDURE — 80053 COMPREHEN METABOLIC PANEL: CPT

## 2025-04-22 PROCEDURE — 93005 ELECTROCARDIOGRAM TRACING: CPT | Performed by: NURSE PRACTITIONER

## 2025-04-22 PROCEDURE — 81001 URINALYSIS AUTO W/SCOPE: CPT

## 2025-04-22 ASSESSMENT — ENCOUNTER SYMPTOMS
APNEA: 0
SINUS PRESSURE: 0
WHEEZING: 1
STRIDOR: 0
FACIAL SWELLING: 0
CONSTIPATION: 0
SINUS PAIN: 0
SORE THROAT: 0
CHOKING: 0
TROUBLE SWALLOWING: 0
SHORTNESS OF BREATH: 1
VOICE CHANGE: 0
COUGH: 0
NAUSEA: 0
RHINORRHEA: 0
CHEST TIGHTNESS: 1
COLOR CHANGE: 0

## 2025-04-22 NOTE — DISCHARGE INSTRUCTIONS
[facility]                   [address] [place] [address]   MAIN OR                               [phone]   MAIN PRE OP                       [phone]                                                                                AMBULATORY PRE OP       [phone]  PRE-ADMISSION TESTING [phone]     Surgery Date:   Thursday 5/8/2025        Is surgery arrival time given by surgeon?   If “NO”, [facility] staff will call you between 3 and 7pm the day before your surgery with your arrival time.   (If your surgery is on a Monday, we will call you the Friday before.)    Call [phone] after 7pm Monday-Friday if you did not receive this call.    INSTRUCTIONS BEFORE YOUR SURGERY   When You  Arrive Free  parking is available from 7:00 AM - 5:00 PM.  Arrive at the 2nd Floor Admitting Desk on the day of your surgery.  Have your insurance card, photo ID, and any copayment (if needed).   Food   and   Drink No food or drink (gum, mints, coffee, juice, etc) after midnight the night before surgery.   No alcohol (beer, wine, liquor) or marijuana 24 hours before and after surgery.    You may drink WATER ONLY up until 2 hours prior to your surgery time.   Please do not add anything to your water as this may result in your surgery being postponed.      Pre- operative  Medication Instructions   MEDICATIONS TO TAKE THE MORNING OF SURGERY WITH A SIP OF WATER:     Hydralazine  Pantoprazole  Trelegy-Ellipta Inhaler  Albuterol Inhaler if needed (bring with you on day pf surgery)    DO NOT TAKE THE FOLLOWING MEDICATIONS THE EVENING BEFORE SURGERY:                 Lisinopril    Tylenol may be taken as needed.   Medications  To  STOP      7 days before surgery Non-Steroidal anti-inflammatory Drugs (NSAID's): for example, Ibuprofen (Advil, Motrin), Naproxen (Aleve).  Herbal supplements, vitamins, and fish oil.  Other:     Blood  Thinners   If you take  Aspirin, Plavix, Coumadin,

## 2025-04-22 NOTE — PERIOP NOTE
Last cardiac note and EKG requested from Dr. Blandon.     Last pulmonary note requested from Dr. Nelson. Per their office staff, the patient has an appointment 5/21/2025 to follow up. Per Dylon JONES, the patient has stopped using his Trelegy inhaler due to cost and as a result is using his rescue inhaler multiple times daily to manage his COPD symptoms. Spoke with Dr. Nelson's office and they were able to move his appointment up to 4/24/2025. Tena PEDRAZA to notify the patient of his new pulmonary appointment.

## 2025-04-23 LAB
EKG ATRIAL RATE: 52 BPM
EKG DIAGNOSIS: NORMAL
EKG P AXIS: 64 DEGREES
EKG P-R INTERVAL: 256 MS
EKG Q-T INTERVAL: 478 MS
EKG QRS DURATION: 98 MS
EKG QTC CALCULATION (BAZETT): 444 MS
EKG R AXIS: 63 DEGREES
EKG T AXIS: 39 DEGREES
EKG VENTRICULAR RATE: 52 BPM

## 2025-04-23 PROCEDURE — 93010 ELECTROCARDIOGRAM REPORT: CPT | Performed by: INTERNAL MEDICINE

## 2025-04-23 NOTE — PERIOP NOTE
Called to patient. Informed to hold Plavix 7 days prior to surgery and ASA 2 days prior to surgery per Dr. Blandon. Verbalized understanding.

## 2025-05-07 NOTE — PERIOP NOTE
Pre-op orders requested again from Dr. Abdi's office, Casie Vidal again- again confirmed fax number (the one she reported was incorrect).  DOS 5/8/2025

## 2025-05-08 ENCOUNTER — HOSPITAL ENCOUNTER (INPATIENT)
Facility: HOSPITAL | Age: 75
LOS: 1 days | Discharge: HOME | DRG: 670 | End: 2025-05-09
Attending: UROLOGY | Admitting: UROLOGY
Payer: MEDICARE

## 2025-05-08 ENCOUNTER — ANESTHESIA (OUTPATIENT)
Facility: HOSPITAL | Age: 75
DRG: 989 | End: 2025-05-08
Payer: MEDICARE

## 2025-05-08 ENCOUNTER — ANESTHESIA EVENT (OUTPATIENT)
Facility: HOSPITAL | Age: 75
DRG: 989 | End: 2025-05-08
Payer: MEDICARE

## 2025-05-08 PROBLEM — Z98.890 H/O TRANSURETHRAL RESECTION OF BLADDER TUMOR (TURBT): Status: ACTIVE | Noted: 2025-05-08

## 2025-05-08 PROBLEM — Z86.03 H/O TRANSURETHRAL RESECTION OF BLADDER TUMOR (TURBT): Status: ACTIVE | Noted: 2025-05-08

## 2025-05-08 PROBLEM — Z98.890 STATUS POST SURGERY: Status: ACTIVE | Noted: 2025-05-08

## 2025-05-08 PROCEDURE — 6360000002 HC RX W HCPCS: Performed by: NURSE ANESTHETIST, CERTIFIED REGISTERED

## 2025-05-08 PROCEDURE — 2580000003 HC RX 258: Performed by: ANESTHESIOLOGY

## 2025-05-08 PROCEDURE — 6360000002 HC RX W HCPCS: Performed by: UROLOGY

## 2025-05-08 PROCEDURE — 3700000000 HC ANESTHESIA ATTENDED CARE: Performed by: UROLOGY

## 2025-05-08 PROCEDURE — 3700000001 HC ADD 15 MINUTES (ANESTHESIA): Performed by: UROLOGY

## 2025-05-08 PROCEDURE — 7100000000 HC PACU RECOVERY - FIRST 15 MIN: Performed by: UROLOGY

## 2025-05-08 PROCEDURE — 6370000000 HC RX 637 (ALT 250 FOR IP): Performed by: UROLOGY

## 2025-05-08 PROCEDURE — 1100000000 HC RM PRIVATE

## 2025-05-08 PROCEDURE — G0378 HOSPITAL OBSERVATION PER HR: HCPCS

## 2025-05-08 PROCEDURE — 6370000000 HC RX 637 (ALT 250 FOR IP): Performed by: NURSE PRACTITIONER

## 2025-05-08 PROCEDURE — 0TBB8ZX EXCISION OF BLADDER, VIA NATURAL OR ARTIFICIAL OPENING ENDOSCOPIC, DIAGNOSTIC: ICD-10-PCS | Performed by: UROLOGY

## 2025-05-08 PROCEDURE — 3600000003 HC SURGERY LEVEL 3 BASE: Performed by: UROLOGY

## 2025-05-08 PROCEDURE — 6360000002 HC RX W HCPCS: Performed by: ANESTHESIOLOGY

## 2025-05-08 PROCEDURE — 3600000013 HC SURGERY LEVEL 3 ADDTL 15MIN: Performed by: UROLOGY

## 2025-05-08 PROCEDURE — 88307 TISSUE EXAM BY PATHOLOGIST: CPT

## 2025-05-08 PROCEDURE — 2500000003 HC RX 250 WO HCPCS: Performed by: NURSE ANESTHETIST, CERTIFIED REGISTERED

## 2025-05-08 PROCEDURE — 2709999900 HC NON-CHARGEABLE SUPPLY: Performed by: UROLOGY

## 2025-05-08 PROCEDURE — 7100000001 HC PACU RECOVERY - ADDTL 15 MIN: Performed by: UROLOGY

## 2025-05-08 PROCEDURE — 2500000003 HC RX 250 WO HCPCS: Performed by: UROLOGY

## 2025-05-08 PROCEDURE — 0T7D8ZZ DILATION OF URETHRA, VIA NATURAL OR ARTIFICIAL OPENING ENDOSCOPIC: ICD-10-PCS | Performed by: UROLOGY

## 2025-05-08 RX ORDER — ALBUTEROL SULFATE 90 UG/1
2 INHALANT RESPIRATORY (INHALATION) EVERY 4 HOURS PRN
Status: DISCONTINUED | OUTPATIENT
Start: 2025-05-08 | End: 2025-05-08

## 2025-05-08 RX ORDER — LIDOCAINE HYDROCHLORIDE 20 MG/ML
JELLY TOPICAL PRN
Status: DISCONTINUED | OUTPATIENT
Start: 2025-05-08 | End: 2025-05-08 | Stop reason: ALTCHOICE

## 2025-05-08 RX ORDER — PANTOPRAZOLE SODIUM 40 MG/1
40 TABLET, DELAYED RELEASE ORAL
Status: DISCONTINUED | OUTPATIENT
Start: 2025-05-09 | End: 2025-05-09 | Stop reason: HOSPADM

## 2025-05-08 RX ORDER — SODIUM CHLORIDE, SODIUM LACTATE, POTASSIUM CHLORIDE, CALCIUM CHLORIDE 600; 310; 30; 20 MG/100ML; MG/100ML; MG/100ML; MG/100ML
INJECTION, SOLUTION INTRAVENOUS CONTINUOUS
Status: DISCONTINUED | OUTPATIENT
Start: 2025-05-08 | End: 2025-05-08 | Stop reason: HOSPADM

## 2025-05-08 RX ORDER — SODIUM CHLORIDE, SODIUM LACTATE, POTASSIUM CHLORIDE, CALCIUM CHLORIDE 600; 310; 30; 20 MG/100ML; MG/100ML; MG/100ML; MG/100ML
INJECTION, SOLUTION INTRAVENOUS CONTINUOUS
Status: CANCELLED | OUTPATIENT
Start: 2025-05-08

## 2025-05-08 RX ORDER — DEXAMETHASONE SODIUM PHOSPHATE 4 MG/ML
INJECTION, SOLUTION INTRA-ARTICULAR; INTRALESIONAL; INTRAMUSCULAR; INTRAVENOUS; SOFT TISSUE
Status: DISCONTINUED | OUTPATIENT
Start: 2025-05-08 | End: 2025-05-08 | Stop reason: SDUPTHER

## 2025-05-08 RX ORDER — ONDANSETRON 2 MG/ML
4 INJECTION INTRAMUSCULAR; INTRAVENOUS
Status: DISCONTINUED | OUTPATIENT
Start: 2025-05-08 | End: 2025-05-08 | Stop reason: HOSPADM

## 2025-05-08 RX ORDER — ROCURONIUM BROMIDE 10 MG/ML
INJECTION, SOLUTION INTRAVENOUS
Status: DISCONTINUED | OUTPATIENT
Start: 2025-05-08 | End: 2025-05-08 | Stop reason: SDUPTHER

## 2025-05-08 RX ORDER — ALBUTEROL SULFATE 0.83 MG/ML
2.5 SOLUTION RESPIRATORY (INHALATION) EVERY 4 HOURS PRN
Status: DISCONTINUED | OUTPATIENT
Start: 2025-05-08 | End: 2025-05-09 | Stop reason: HOSPADM

## 2025-05-08 RX ORDER — FENTANYL CITRATE 50 UG/ML
100 INJECTION, SOLUTION INTRAMUSCULAR; INTRAVENOUS
Status: DISCONTINUED | OUTPATIENT
Start: 2025-05-08 | End: 2025-05-08 | Stop reason: HOSPADM

## 2025-05-08 RX ORDER — LIDOCAINE HYDROCHLORIDE 10 MG/ML
1 INJECTION, SOLUTION EPIDURAL; INFILTRATION; INTRACAUDAL; PERINEURAL
Status: DISCONTINUED | OUTPATIENT
Start: 2025-05-08 | End: 2025-05-08 | Stop reason: HOSPADM

## 2025-05-08 RX ORDER — PRAVASTATIN SODIUM 20 MG
40 TABLET ORAL NIGHTLY
Status: DISCONTINUED | OUTPATIENT
Start: 2025-05-08 | End: 2025-05-09 | Stop reason: HOSPADM

## 2025-05-08 RX ORDER — TRAZODONE HYDROCHLORIDE 100 MG/1
200 TABLET ORAL NIGHTLY
Status: DISCONTINUED | OUTPATIENT
Start: 2025-05-08 | End: 2025-05-09 | Stop reason: HOSPADM

## 2025-05-08 RX ORDER — NALOXONE HYDROCHLORIDE 0.4 MG/ML
INJECTION, SOLUTION INTRAMUSCULAR; INTRAVENOUS; SUBCUTANEOUS PRN
Status: DISCONTINUED | OUTPATIENT
Start: 2025-05-08 | End: 2025-05-08 | Stop reason: HOSPADM

## 2025-05-08 RX ORDER — ONDANSETRON 2 MG/ML
INJECTION INTRAMUSCULAR; INTRAVENOUS
Status: DISCONTINUED | OUTPATIENT
Start: 2025-05-08 | End: 2025-05-08 | Stop reason: SDUPTHER

## 2025-05-08 RX ORDER — AMLODIPINE BESYLATE 5 MG/1
10 TABLET ORAL NIGHTLY
Status: CANCELLED | OUTPATIENT
Start: 2025-05-09

## 2025-05-08 RX ORDER — MORPHINE SULFATE 4 MG/ML
2 INJECTION, SOLUTION INTRAMUSCULAR; INTRAVENOUS
Refills: 0 | Status: CANCELLED | OUTPATIENT
Start: 2025-05-08

## 2025-05-08 RX ORDER — DIPHENHYDRAMINE HYDROCHLORIDE 50 MG/ML
12.5 INJECTION, SOLUTION INTRAMUSCULAR; INTRAVENOUS
Status: DISCONTINUED | OUTPATIENT
Start: 2025-05-08 | End: 2025-05-08 | Stop reason: HOSPADM

## 2025-05-08 RX ORDER — HYDRALAZINE HYDROCHLORIDE 25 MG/1
50 TABLET, FILM COATED ORAL EVERY 8 HOURS SCHEDULED
Status: DISCONTINUED | OUTPATIENT
Start: 2025-05-08 | End: 2025-05-09 | Stop reason: HOSPADM

## 2025-05-08 RX ORDER — MIDAZOLAM HYDROCHLORIDE 2 MG/2ML
2 INJECTION, SOLUTION INTRAMUSCULAR; INTRAVENOUS
Status: DISCONTINUED | OUTPATIENT
Start: 2025-05-08 | End: 2025-05-08 | Stop reason: HOSPADM

## 2025-05-08 RX ORDER — FENTANYL CITRATE 50 UG/ML
INJECTION, SOLUTION INTRAMUSCULAR; INTRAVENOUS
Status: DISCONTINUED | OUTPATIENT
Start: 2025-05-08 | End: 2025-05-08 | Stop reason: SDUPTHER

## 2025-05-08 RX ORDER — SENNA AND DOCUSATE SODIUM 50; 8.6 MG/1; MG/1
1 TABLET, FILM COATED ORAL 2 TIMES DAILY
Status: CANCELLED | OUTPATIENT
Start: 2025-05-08

## 2025-05-08 RX ORDER — PROPOFOL 10 MG/ML
INJECTION, EMULSION INTRAVENOUS
Status: DISCONTINUED | OUTPATIENT
Start: 2025-05-08 | End: 2025-05-08 | Stop reason: SDUPTHER

## 2025-05-08 RX ORDER — ONDANSETRON 2 MG/ML
4 INJECTION INTRAMUSCULAR; INTRAVENOUS EVERY 6 HOURS PRN
Status: CANCELLED | OUTPATIENT
Start: 2025-05-08

## 2025-05-08 RX ORDER — HYDRALAZINE HYDROCHLORIDE 20 MG/ML
10 INJECTION INTRAMUSCULAR; INTRAVENOUS EVERY 6 HOURS PRN
Status: DISCONTINUED | OUTPATIENT
Start: 2025-05-08 | End: 2025-05-09 | Stop reason: HOSPADM

## 2025-05-08 RX ORDER — OXYCODONE HYDROCHLORIDE 5 MG/1
5 TABLET ORAL EVERY 4 HOURS PRN
Status: DISCONTINUED | OUTPATIENT
Start: 2025-05-08 | End: 2025-05-09 | Stop reason: HOSPADM

## 2025-05-08 RX ORDER — SODIUM CHLORIDE 0.9 % (FLUSH) 0.9 %
5-40 SYRINGE (ML) INJECTION PRN
Status: CANCELLED | OUTPATIENT
Start: 2025-05-08

## 2025-05-08 RX ORDER — PROMETHAZINE HYDROCHLORIDE 25 MG/1
12.5 TABLET ORAL EVERY 6 HOURS PRN
Status: CANCELLED | OUTPATIENT
Start: 2025-05-08

## 2025-05-08 RX ORDER — AMLODIPINE BESYLATE 5 MG/1
10 TABLET ORAL NIGHTLY
Status: DISCONTINUED | OUTPATIENT
Start: 2025-05-08 | End: 2025-05-09 | Stop reason: HOSPADM

## 2025-05-08 RX ORDER — SODIUM CHLORIDE 0.9 % (FLUSH) 0.9 %
5-40 SYRINGE (ML) INJECTION EVERY 12 HOURS SCHEDULED
Status: CANCELLED | OUTPATIENT
Start: 2025-05-08

## 2025-05-08 RX ORDER — LISINOPRIL 20 MG/1
40 TABLET ORAL DAILY
Status: DISCONTINUED | OUTPATIENT
Start: 2025-05-08 | End: 2025-05-09 | Stop reason: HOSPADM

## 2025-05-08 RX ADMIN — HYDROMORPHONE HYDROCHLORIDE 0.5 MG: 1 INJECTION, SOLUTION INTRAMUSCULAR; INTRAVENOUS; SUBCUTANEOUS at 14:40

## 2025-05-08 RX ADMIN — SODIUM CHLORIDE, POTASSIUM CHLORIDE, SODIUM LACTATE AND CALCIUM CHLORIDE: 600; 310; 30; 20 INJECTION, SOLUTION INTRAVENOUS at 12:18

## 2025-05-08 RX ADMIN — AMLODIPINE BESYLATE 10 MG: 5 TABLET ORAL at 20:40

## 2025-05-08 RX ADMIN — OXYCODONE 5 MG: 5 TABLET ORAL at 20:41

## 2025-05-08 RX ADMIN — FLUOXETINE HYDROCHLORIDE 40 MG: 20 CAPSULE ORAL at 20:46

## 2025-05-08 RX ADMIN — ROCURONIUM BROMIDE 50 MG: 10 INJECTION INTRAVENOUS at 13:02

## 2025-05-08 RX ADMIN — OXYCODONE 5 MG: 5 TABLET ORAL at 15:36

## 2025-05-08 RX ADMIN — ONDANSETRON 4 MG: 2 INJECTION, SOLUTION INTRAMUSCULAR; INTRAVENOUS at 13:43

## 2025-05-08 RX ADMIN — FENTANYL CITRATE 50 MCG: 50 INJECTION, SOLUTION INTRAMUSCULAR; INTRAVENOUS at 12:54

## 2025-05-08 RX ADMIN — HYDRALAZINE HYDROCHLORIDE 50 MG: 25 TABLET ORAL at 20:40

## 2025-05-08 RX ADMIN — HYDRALAZINE HYDROCHLORIDE 50 MG: 25 TABLET ORAL at 17:15

## 2025-05-08 RX ADMIN — HYDROMORPHONE HYDROCHLORIDE 0.5 MG: 1 INJECTION, SOLUTION INTRAMUSCULAR; INTRAVENOUS; SUBCUTANEOUS at 15:33

## 2025-05-08 RX ADMIN — TRAZODONE HYDROCHLORIDE 200 MG: 100 TABLET ORAL at 20:40

## 2025-05-08 RX ADMIN — PRAVASTATIN SODIUM 40 MG: 20 TABLET ORAL at 20:41

## 2025-05-08 RX ADMIN — PROPOFOL 200 MG: 10 INJECTION, EMULSION INTRAVENOUS at 13:02

## 2025-05-08 RX ADMIN — LISINOPRIL 40 MG: 20 TABLET ORAL at 20:46

## 2025-05-08 RX ADMIN — ROCURONIUM BROMIDE 10 MG: 10 INJECTION INTRAVENOUS at 13:17

## 2025-05-08 RX ADMIN — SUGAMMADEX 200 MG: 100 INJECTION, SOLUTION INTRAVENOUS at 13:48

## 2025-05-08 RX ADMIN — FENTANYL CITRATE 50 MCG: 50 INJECTION, SOLUTION INTRAMUSCULAR; INTRAVENOUS at 13:01

## 2025-05-08 RX ADMIN — HYDROMORPHONE HYDROCHLORIDE 0.5 MG: 1 INJECTION, SOLUTION INTRAMUSCULAR; INTRAVENOUS; SUBCUTANEOUS at 14:51

## 2025-05-08 RX ADMIN — WATER 2000 MG: 1 INJECTION INTRAMUSCULAR; INTRAVENOUS; SUBCUTANEOUS at 13:12

## 2025-05-08 RX ADMIN — DEXAMETHASONE SODIUM PHOSPHATE 8 MG: 4 INJECTION, SOLUTION INTRAMUSCULAR; INTRAVENOUS at 12:54

## 2025-05-08 ASSESSMENT — COPD QUESTIONNAIRES: CAT_SEVERITY: MODERATE

## 2025-05-08 ASSESSMENT — PAIN SCALES - GENERAL
PAINLEVEL_OUTOF10: 8
PAINLEVEL_OUTOF10: 0
PAINLEVEL_OUTOF10: 8
PAINLEVEL_OUTOF10: 8
PAINLEVEL_OUTOF10: 6
PAINLEVEL_OUTOF10: 8

## 2025-05-08 ASSESSMENT — PAIN DESCRIPTION - LOCATION
LOCATION: PENIS
LOCATION: PENIS
LOCATION: PENIS;PELVIS

## 2025-05-08 ASSESSMENT — LIFESTYLE VARIABLES: SMOKING_STATUS: 1

## 2025-05-08 ASSESSMENT — PAIN - FUNCTIONAL ASSESSMENT: PAIN_FUNCTIONAL_ASSESSMENT: NONE - DENIES PAIN

## 2025-05-08 ASSESSMENT — PAIN DESCRIPTION - ORIENTATION: ORIENTATION: LOWER

## 2025-05-08 ASSESSMENT — PAIN DESCRIPTION - DESCRIPTORS
DESCRIPTORS: CRAMPING;SORE
DESCRIPTORS: DISCOMFORT;OTHER (COMMENT)

## 2025-05-08 NOTE — ANESTHESIA PRE PROCEDURE
Department of Anesthesiology  Preprocedure Note       Name:  Misael Oneil   Age:  74 y.o.  :  1950                                          MRN:  [id number]         Date:  2025      Surgeon: Surgeon(s):  Jer Abdi II, MD    Procedure: Procedure(s):  TRANSURETHRAL RESECTION OF BLADDER    Medications prior to admission:   Prior to Admission medications    Medication Sig Start Date End Date Taking? Authorizing Provider   B Complex Vitamins (VITAMIN B COMPLEX PO) Take by mouth every morning   Yes Jennifer Kaufman MD   Multiple Vitamin (MULTIVITAMIN ADULT PO) Take by mouth every morning   Yes Jennifer Kaufman MD   VITAMIN E PO Take by mouth every morning   Yes Provider, Historical, MD   Saw Palmetto, Serenoa repens, (SAW PALMETTO PO) Take by mouth every morning   Yes Jennifer Kaufman MD   amLODIPine (NORVASC) 5 MG tablet Take 2 tablets by mouth nightly Take 2 every night.   Yes Jennifer Kaufman MD   Ascorbic Acid (VITAMIN C PO) Take by mouth every morning   Yes Jennifer Kaufman MD   ALBUTEROL IN Inhale into the lungs as needed   Yes Jennifer Kaufman MD   Fluticasone-Umeclidin-Vilant (TRELEGY ELLIPTA IN) Inhale into the lungs daily   Yes Jennifer Kaufman MD   aspirin 81 MG chewable tablet Take 1 tablet by mouth every morning   Yes Automatic Reconciliation, Ar   clopidogrel (PLAVIX) 75 MG tablet Take by mouth every morning   Yes Automatic Reconciliation, Ar   FLUoxetine (PROZAC) 40 MG capsule Take 1 capsule by mouth nightly   Yes Automatic Reconciliation, Ar   pantoprazole (PROTONIX) 40 MG tablet Take by mouth daily   Yes Automatic Reconciliation, Ar   pravastatin (PRAVACHOL) 40 MG tablet Take 1 tablet by mouth nightly 11  Yes Automatic Reconciliation, Ar   traZODone (DESYREL) 100 MG tablet Take by mouth nightly Take 2 every night.   Yes Automatic Reconciliation, Ar   hydrALAZINE (APRESOLINE) 50 MG tablet Take 1 tablet by mouth 3 times daily    Jennifer Kaufman

## 2025-05-08 NOTE — OP NOTE
Operative Note      Patient: Misael Oneil  YOB: 1950  MRN: [id number]    Date of Procedure: 5/8/2025    Pre-op diagnosis: Bladder mass, urethral stricture    Post-Op Diagnosis: Same       Procedure(s):  TRANSURETHRAL RESECTION OF BLADDER TUMOR, OPTIMAL INTERNAL URETEROTOMY, URETHRAL DILATION    Surgeon(s):  Jer Abdi II, MD    Assistant:   * No surgical staff found *    Anesthesia: General    Estimated Blood Loss (mL): less than 50     Complications: None    Specimens:   ID Type Source Tests Collected by Time Destination   1 : BLADDER TUMOR GREATER THAN 3 CM Tissue Bladder SURGICAL PATHOLOGY Jer Abdi II, MD 5/8/2025 1341        Implants:  * No implants in log *      Drains:   Urinary Catheter 05/08/25 3 Way (Active)   $ Urethral catheter insertion $ Not inserted for procedure 05/08/25 1357   Catheter Indications Urinary retention (acute or chronic), continuous bladder irrigation or bladder outlet obstruction 05/08/25 1357   Site Assessment Red 05/08/25 1357   Urine Color Pink 05/08/25 1357   Urine Appearance Clear 05/08/25 1357   Collection Container Standard 05/08/25 1357   Securement Method Securing device (Describe) 05/08/25 1357   Catheter Care  Perineal wipes 05/08/25 1357   Catheter Best Practices  Drainage tube clipped to bed;Catheter secured to thigh;Tamper seal intact;Bag below bladder;Bag not on floor;Lack of dependent loop in tubing;Drainage bag less than half full 05/08/25 1357   Status Continuous bladder irrigation;Patent;Draining 05/08/25 1357   Rate [place] 05/08/25 1357   Irrigant Normal saline 05/08/25 1357       Findings:  Infection Present At Time Of Surgery (PATOS) (choose all levels that have infection present):  No infection present  Other Findings: Bulbar urethral stricture, 3 cm bladder mass seen proximal to the right ureteral orifice extending up to the right lateral wall, several satellite areas seen as bladder tumor adjacent    Detailed Description of Procedure:

## 2025-05-08 NOTE — ANESTHESIA POSTPROCEDURE EVALUATION
Department of Anesthesiology  Postprocedure Note    Patient: Misael Oneil  MRN: [id number]  YOB: 1950  Date of evaluation: 5/8/2025    Procedure Summary       Date: 05/08/25 Room / Location: [facility] MAIN OR  / [facility] MAIN OR    Anesthesia Start: 1256 Anesthesia Stop: 1410    Procedure: TRANSURETHRAL RESECTION OF BLADDER TUMOR, OPTIMAL INTERNAL URETEROTOMY, URETHRAL DILATION (Bladder) Diagnosis:       Bladder distension      (Bladder distension [N32.89])    Surgeons: Jer Abdi II, MD Responsible Provider: Cruz Don MD    Anesthesia Type: General ASA Status: 3            Anesthesia Type: General    Tatianna Phase I: Tatianna Score: 10    Tatianna Phase II:      Anesthesia Post Evaluation    No notable events documented.

## 2025-05-08 NOTE — PERIOP NOTE
TRANSFER - OUT REPORT:    Verbal report given to RN on Misael Oneil  being transferred to [facility] for routine post-op for observation    Report consisted of patient's Situation, Background, Assessment and   Recommendations(SBAR).     Information from the following report(s) Nurse Handoff Report, ED SBAR, MAR, Recent Results, Cardiac Rhythm SB with 1st degree block, Procedure Verification, and Event Log was reviewed with the receiving nurse.   Peripheral Intravenous Line:  Peripheral IV 05/08/25 Posterior;Right Hand (Active)   Site Assessment Clean, dry & intact 05/08/25 1509   Line Status Blood return noted;Flushed;Infusing 05/08/25 1509   Line Care Connections checked and tightened 05/08/25 1509   Phlebitis Assessment No symptoms 05/08/25 1509   Infiltration Assessment 0 05/08/25 1509   Alcohol Cap Used Yes 05/08/25 1509   Dressing Status Clean, dry & intact 05/08/25 1509   Dressing Type Transparent 05/08/25 1509   Dressing Intervention New 05/08/25 1427     Drain(s):  Urinary Catheter 05/08/25 3 Way (Active)   $ Urethral catheter insertion $ Not inserted for procedure 05/08/25 1357   Catheter Indications Urinary retention (acute or chronic), continuous bladder irrigation or bladder outlet obstruction 05/08/25 1437   Site Assessment Bleeding 05/08/25 1437   Urine Color Bloody;Pink 05/08/25 1437   Urine Appearance Clear 05/08/25 1437   Collection Container Standard 05/08/25 1437   Securement Method Securing device (Describe) 05/08/25 1437   Catheter Care  Perineal wipes 05/08/25 1415   Catheter Best Practices  Drainage tube clipped to bed;Catheter secured to thigh;Tamper seal intact;Bag below bladder;Bag not on floor;Lack of dependent loop in tubing;Drainage bag less than half full 05/08/25 1415   Status Continuous bladder irrigation;Draining;Patent 05/08/25 1437   $ Bladder irrigation simple- 1X PER DAY $ Yes 05/08/25 1437   Rate Slow 05/08/25 1545   Irrigant Normal saline 05/08/25 1437   CBI Irrigation Intake (mL)

## 2025-05-08 NOTE — PROGRESS NOTES
Reassessed pt, CBI off for approx 15-20 mins. Urine grossly bloody in proximal tubing. CBI restarted to mod gtt -> urine cleared easily.     Keep catheter on tension overnight  Nursing to titrate CBI to ensure urine clear/pink tinged  Stop CBI and manually irrigate PRN if concerns for catheter occlusion     If urine remains clear in AM, stop CBI at 0600 for my reassessment     Encourage oral hydration   CBC in AM  Garay to remain in place    Call if needed overnight   Ann Brgigs, APRN - NP

## 2025-05-09 VITALS
HEART RATE: 57 BPM | BODY MASS INDEX: 33.65 KG/M2 | SYSTOLIC BLOOD PRESSURE: 133 MMHG | HEIGHT: 68 IN | RESPIRATION RATE: 14 BRPM | TEMPERATURE: 97.5 F | OXYGEN SATURATION: 95 % | WEIGHT: 222 LBS | DIASTOLIC BLOOD PRESSURE: 58 MMHG

## 2025-05-09 LAB
ERYTHROCYTE [DISTWIDTH] IN BLOOD BY AUTOMATED COUNT: 13.2 % (ref 11.5–14.5)
HCT VFR BLD AUTO: 40.8 % (ref 36.6–50.3)
HGB BLD-MCNC: 13.7 G/DL (ref 12.1–17)
MCH RBC QN AUTO: 34 PG (ref 26–34)
MCHC RBC AUTO-ENTMCNC: 33.6 G/DL (ref 30–36.5)
MCV RBC AUTO: 101.2 FL (ref 80–99)
NRBC # BLD: 0 K/UL (ref 0–0.01)
NRBC BLD-RTO: 0 PER 100 WBC
PLATELET # BLD AUTO: 169 K/UL (ref 150–400)
PMV BLD AUTO: 10.3 FL (ref 8.9–12.9)
RBC # BLD AUTO: 4.03 M/UL (ref 4.1–5.7)
WBC # BLD AUTO: 14.5 K/UL (ref 4.1–11.1)

## 2025-05-09 PROCEDURE — 6370000000 HC RX 637 (ALT 250 FOR IP): Performed by: NURSE PRACTITIONER

## 2025-05-09 PROCEDURE — 85027 COMPLETE CBC AUTOMATED: CPT

## 2025-05-09 PROCEDURE — 94761 N-INVAS EAR/PLS OXIMETRY MLT: CPT

## 2025-05-09 PROCEDURE — G0378 HOSPITAL OBSERVATION PER HR: HCPCS

## 2025-05-09 PROCEDURE — 36415 COLL VENOUS BLD VENIPUNCTURE: CPT

## 2025-05-09 RX ADMIN — FLUOXETINE HYDROCHLORIDE 40 MG: 20 CAPSULE ORAL at 09:42

## 2025-05-09 RX ADMIN — LISINOPRIL 40 MG: 20 TABLET ORAL at 09:42

## 2025-05-09 NOTE — PLAN OF CARE
Problem: Chronic Conditions and Co-morbidities  Goal: Patient's chronic conditions and co-morbidity symptoms are monitored and maintained or improved  Outcome: Progressing  Flowsheets (Taken 5/8/2025 2015)  Care Plan - Patient's Chronic Conditions and Co-Morbidity Symptoms are Monitored and Maintained or Improved: Monitor and assess patient's chronic conditions and comorbid symptoms for stability, deterioration, or improvement     Problem: Safety - Adult  Goal: Free from fall injury  Outcome: Progressing     Problem: ABCDS Injury Assessment  Goal: Absence of physical injury  Outcome: Progressing     Problem: Pain  Goal: Verbalizes/displays adequate comfort level or baseline comfort level  Outcome: Progressing     Problem: Skin/Tissue Integrity  Goal: Skin integrity remains intact  Description: 1.  Monitor for areas of redness and/or skin breakdown2.  Assess vascular access sites hourly3.  Every 4-6 hours minimum:  Change oxygen saturation probe site4.  Every 4-6 hours:  If on nasal continuous positive airway pressure, respiratory therapy assess nares and determine need for appliance change or resting period  Outcome: Progressing  Flowsheets (Taken 5/8/2025 2015)  Skin Integrity Remains Intact: Monitor for areas of redness and/or skin breakdown

## 2025-05-09 NOTE — DISCHARGE SUMMARY
Urology Discharge Summary    Patient: Misael Oneil MRN: [id number]  SSN: [id number]    YOB: 1950  Age: 74 y.o.  Sex: male                 FOLLOWUP: As scheduled    MEDS: Current Discharge Medication List        CONTINUE these medications which have NOT CHANGED    Details   B Complex Vitamins (VITAMIN B COMPLEX PO) Take by mouth every morning      Multiple Vitamin (MULTIVITAMIN ADULT PO) Take by mouth every morning      VITAMIN E PO Take by mouth every morning      amLODIPine (NORVASC) 5 MG tablet Take 2 tablets by mouth nightly Take 2 every night.      Ascorbic Acid (VITAMIN C PO) Take by mouth every morning      ALBUTEROL IN Inhale into the lungs as needed      Fluticasone-Umeclidin-Vilant (TRELEGY ELLIPTA IN) Inhale into the lungs daily      aspirin 81 MG chewable tablet Take 1 tablet by mouth every morning starting 5/10/25      FLUoxetine (PROZAC) 40 MG capsule Take 1 capsule by mouth nightly      pantoprazole (PROTONIX) 40 MG tablet Take by mouth daily      pravastatin (PRAVACHOL) 40 MG tablet Take 1 tablet by mouth nightly      traZODone (DESYREL) 100 MG tablet Take by mouth nightly Take 2 every night.      hydrALAZINE (APRESOLINE) 50 MG tablet Take 1 tablet by mouth 3 times daily      Omega-3 Fatty Acids (FISH OIL PO) Take by mouth every morning      polyethylene glycol (GLYCOLAX) 17 g packet Take 1 packet by mouth daily as needed for Constipation      lisinopril (PRINIVIL;ZESTRIL) 40 MG tablet 1 tablet nightly           STOP taking these medications       Jones Villagomez, (SAW PALMETTO PO) Comments:   Reason for Stopping:         clopidogrel (PLAVIX) 75 MG tablet Comments:   Reason for Stopping:  RESUME PLAVIX SUNDAY 5/11/25               ADMISSION: 5/8/2025 by Jer Abdi II, MD to Home or Self Care  There were no encounter diagnoses.   DISCHARGE: 5/9/2025 9:05 AM to Home or Self Care   PROCEDURES: 1 Day Post-Op Procedure(s):  TRANSURETHRAL RESECTION OF BLADDER TUMOR,

## 2025-05-09 NOTE — PROGRESS NOTES
Patient is alert and oriented     3 way Chavez Clamped patient was educated on how  to  manage the chavez at home patient understood and agreed on education.     Family at bedside.

## 2025-07-13 ENCOUNTER — APPOINTMENT (OUTPATIENT)
Facility: HOSPITAL | Age: 75
DRG: 871 | End: 2025-07-13
Payer: MEDICARE

## 2025-07-13 ENCOUNTER — HOSPITAL ENCOUNTER (INPATIENT)
Facility: HOSPITAL | Age: 75
LOS: 5 days | Discharge: HOME | DRG: 871 | End: 2025-07-18
Attending: EMERGENCY MEDICINE
Payer: MEDICARE

## 2025-07-13 DIAGNOSIS — I71.40 ABDOMINAL AORTIC ANEURYSM (AAA) WITHOUT RUPTURE, UNSPECIFIED PART: ICD-10-CM

## 2025-07-13 DIAGNOSIS — R41.82 ALTERED MENTAL STATUS, UNSPECIFIED ALTERED MENTAL STATUS TYPE: Primary | ICD-10-CM

## 2025-07-13 DIAGNOSIS — A41.9 SEPSIS, DUE TO UNSPECIFIED ORGANISM, UNSPECIFIED WHETHER ACUTE ORGAN DYSFUNCTION PRESENT (HCC): ICD-10-CM

## 2025-07-13 DIAGNOSIS — E86.0 SEVERE DEHYDRATION: ICD-10-CM

## 2025-07-13 PROBLEM — N17.9 AKI (ACUTE KIDNEY INJURY): Status: ACTIVE | Noted: 2025-07-13

## 2025-07-13 PROBLEM — N39.0 URINARY TRACT INFECTION WITHOUT HEMATURIA: Status: ACTIVE | Noted: 2025-07-13

## 2025-07-13 LAB
ALBUMIN SERPL-MCNC: 2.9 G/DL (ref 3.5–5)
ALBUMIN/GLOB SERPL: 0.7 (ref 1.1–2.2)
ALP SERPL-CCNC: 164 U/L (ref 45–117)
ALT SERPL-CCNC: 19 U/L (ref 12–78)
AMMONIA PLAS-SCNC: 23 UMOL/L
ANION GAP SERPL CALC-SCNC: 18 MMOL/L (ref 2–12)
APPEARANCE UR: ABNORMAL
AST SERPL W P-5'-P-CCNC: 36 U/L (ref 15–37)
BACTERIA URNS QL MICRO: ABNORMAL /HPF
BASE DEFICIT BLD-SCNC: 4.6 MMOL/L
BASOPHILS # BLD: 0 K/UL (ref 0–0.1)
BASOPHILS # BLD: 0 K/UL (ref 0–0.1)
BASOPHILS NFR BLD: 0 % (ref 0–1)
BASOPHILS NFR BLD: 0 % (ref 0–1)
BILIRUB SERPL-MCNC: 1.2 MG/DL (ref 0.2–1)
BILIRUB UR QL: NEGATIVE
BUN SERPL-MCNC: 12 MG/DL (ref 6–20)
BUN/CREAT SERPL: 5 (ref 12–20)
CA-I BLD-MCNC: 1.15 MMOL/L (ref 1.12–1.32)
CA-I BLD-MCNC: 9.2 MG/DL (ref 8.5–10.1)
CHLORIDE BLD-SCNC: 105 MMOL/L (ref 98–107)
CHLORIDE SERPL-SCNC: 105 MMOL/L (ref 97–108)
CO2 BLD-SCNC: 19 MMOL/L
CO2 SERPL-SCNC: 17 MMOL/L (ref 21–32)
COLOR UR: ABNORMAL
CREAT SERPL-MCNC: 2.25 MG/DL (ref 0.7–1.3)
CREAT UR-MCNC: 1.9 MG/DL (ref 0.6–1.3)
CRP SERPL-MCNC: 8.96 MG/DL (ref 0–0.3)
DIFFERENTIAL METHOD BLD: ABNORMAL
DIFFERENTIAL METHOD BLD: ABNORMAL
EOSINOPHIL # BLD: 0 K/UL (ref 0–0.4)
EOSINOPHIL # BLD: 0 K/UL (ref 0–0.4)
EOSINOPHIL NFR BLD: 0 % (ref 0–7)
EOSINOPHIL NFR BLD: 0 % (ref 0–7)
EPITH CASTS URNS QL MICRO: ABNORMAL /LPF
ERYTHROCYTE [DISTWIDTH] IN BLOOD BY AUTOMATED COUNT: 12.6 % (ref 11.5–14.5)
ERYTHROCYTE [DISTWIDTH] IN BLOOD BY AUTOMATED COUNT: 12.8 % (ref 11.5–14.5)
FLUAV RNA SPEC QL NAA+PROBE: NOT DETECTED
FLUBV RNA SPEC QL NAA+PROBE: NOT DETECTED
GLOBULIN SER CALC-MCNC: 3.9 G/DL (ref 2–4)
GLUCOSE BLD STRIP.AUTO-MCNC: 110 MG/DL (ref 65–100)
GLUCOSE BLD STRIP.AUTO-MCNC: 119 MG/DL (ref 65–100)
GLUCOSE BLD STRIP.AUTO-MCNC: 124 MG/DL (ref 65–100)
GLUCOSE BLD STRIP.AUTO-MCNC: 94 MG/DL (ref 65–100)
GLUCOSE SERPL-MCNC: 102 MG/DL (ref 65–100)
GLUCOSE UR STRIP.AUTO-MCNC: NEGATIVE MG/DL
HCO3 BLD-SCNC: 19.9 MMOL/L (ref 19–28)
HCT VFR BLD AUTO: 35.8 % (ref 36.6–50.3)
HCT VFR BLD AUTO: 40 % (ref 36.6–50.3)
HGB BLD-MCNC: 12.2 G/DL (ref 12.1–17)
HGB BLD-MCNC: 13.6 G/DL (ref 12.1–17)
HGB UR QL STRIP: ABNORMAL
IMM GRANULOCYTES # BLD AUTO: 0 K/UL
IMM GRANULOCYTES # BLD AUTO: 0 K/UL
IMM GRANULOCYTES NFR BLD AUTO: 0 %
IMM GRANULOCYTES NFR BLD AUTO: 0 %
KETONES UR QL STRIP.AUTO: NEGATIVE MG/DL
LACTATE BLD-SCNC: 11.65 MMOL/L (ref 0.4–2)
LACTATE BLD-SCNC: 4.2 MMOL/L (ref 0.4–2)
LACTATE SERPL-SCNC: 3.7 MMOL/L (ref 0.4–2)
LEUKOCYTE ESTERASE UR QL STRIP.AUTO: ABNORMAL
LYMPHOCYTES # BLD: 0.37 K/UL (ref 0.8–3.5)
LYMPHOCYTES # BLD: 0.67 K/UL (ref 0.8–3.5)
LYMPHOCYTES NFR BLD: 2 % (ref 12–49)
LYMPHOCYTES NFR BLD: 7 % (ref 12–49)
MCH RBC QN AUTO: 32.9 PG (ref 26–34)
MCH RBC QN AUTO: 33.3 PG (ref 26–34)
MCHC RBC AUTO-ENTMCNC: 34 G/DL (ref 30–36.5)
MCHC RBC AUTO-ENTMCNC: 34.1 G/DL (ref 30–36.5)
MCV RBC AUTO: 96.5 FL (ref 80–99)
MCV RBC AUTO: 97.8 FL (ref 80–99)
MONOCYTES # BLD: 0.48 K/UL (ref 0–1)
MONOCYTES # BLD: 1.29 K/UL (ref 0–1)
MONOCYTES NFR BLD: 5 % (ref 5–13)
MONOCYTES NFR BLD: 7 % (ref 5–13)
MUCOUS THREADS URNS QL MICRO: ABNORMAL /LPF
NEUTS BAND NFR BLD MANUAL: 1 % (ref 0–6)
NEUTS BAND NFR BLD MANUAL: 3 % (ref 0–6)
NEUTS SEG # BLD: 16.74 K/UL (ref 1.8–8)
NEUTS SEG # BLD: 8.45 K/UL (ref 1.8–8)
NEUTS SEG NFR BLD: 87 % (ref 32–75)
NEUTS SEG NFR BLD: 88 % (ref 32–75)
NITRITE UR QL STRIP.AUTO: POSITIVE
NRBC # BLD: 0 K/UL (ref 0–0.01)
NRBC # BLD: 0 K/UL (ref 0–0.01)
NRBC BLD-RTO: 0 PER 100 WBC
NRBC BLD-RTO: 0 PER 100 WBC
PCO2 BLD: 34.7 MMHG (ref 35–45)
PERFORMED BY:: ABNORMAL
PH BLD: 7.37 (ref 7.35–7.45)
PH UR STRIP: 5 (ref 5–8)
PLATELET # BLD AUTO: 118 K/UL (ref 150–400)
PLATELET # BLD AUTO: 154 K/UL (ref 150–400)
PMV BLD AUTO: 10.6 FL (ref 8.9–12.9)
PMV BLD AUTO: 10.8 FL (ref 8.9–12.9)
PO2 BLD: <27 MMHG (ref 75–100)
POTASSIUM BLD-SCNC: 3.1 MMOL/L (ref 3.5–5.5)
POTASSIUM SERPL-SCNC: 3.2 MMOL/L (ref 3.5–5.1)
PROCALCITONIN SERPL-MCNC: 24.12 NG/ML
PROT SERPL-MCNC: 6.8 G/DL (ref 6.4–8.2)
PROT UR STRIP-MCNC: NEGATIVE MG/DL
RBC # BLD AUTO: 3.71 M/UL (ref 4.1–5.7)
RBC # BLD AUTO: 4.09 M/UL (ref 4.1–5.7)
RBC #/AREA URNS HPF: ABNORMAL /HPF (ref 0–5)
RBC MORPH BLD: ABNORMAL
RBC MORPH BLD: ABNORMAL
SARS-COV-2 RNA RESP QL NAA+PROBE: NOT DETECTED
SERVICE CMNT-IMP: ABNORMAL
SODIUM BLD-SCNC: 138 MMOL/L (ref 136–145)
SODIUM SERPL-SCNC: 140 MMOL/L (ref 136–145)
SP GR UR REFRACTOMETRY: 1.01 (ref 1–1.03)
SPECIMEN SITE: ABNORMAL
URINE CULTURE IF INDICATED: ABNORMAL
UROBILINOGEN UR QL STRIP.AUTO: 0.1 EU/DL (ref 0.1–1)
WBC # BLD AUTO: 18.4 K/UL (ref 4.1–11.1)
WBC # BLD AUTO: 9.6 K/UL (ref 4.1–11.1)
WBC URNS QL MICRO: >100 /HPF (ref 0–4)

## 2025-07-13 PROCEDURE — 86140 C-REACTIVE PROTEIN: CPT

## 2025-07-13 PROCEDURE — 6360000002 HC RX W HCPCS: Performed by: EMERGENCY MEDICINE

## 2025-07-13 PROCEDURE — 87636 SARSCOV2 & INF A&B AMP PRB: CPT

## 2025-07-13 PROCEDURE — 83605 ASSAY OF LACTIC ACID: CPT

## 2025-07-13 PROCEDURE — 96375 TX/PRO/DX INJ NEW DRUG ADDON: CPT

## 2025-07-13 PROCEDURE — 84132 ASSAY OF SERUM POTASSIUM: CPT

## 2025-07-13 PROCEDURE — 36415 COLL VENOUS BLD VENIPUNCTURE: CPT

## 2025-07-13 PROCEDURE — 82330 ASSAY OF CALCIUM: CPT

## 2025-07-13 PROCEDURE — 80053 COMPREHEN METABOLIC PANEL: CPT

## 2025-07-13 PROCEDURE — 1100000000 HC RM PRIVATE

## 2025-07-13 PROCEDURE — 94640 AIRWAY INHALATION TREATMENT: CPT

## 2025-07-13 PROCEDURE — 82803 BLOOD GASES ANY COMBINATION: CPT

## 2025-07-13 PROCEDURE — 99285 EMERGENCY DEPT VISIT HI MDM: CPT

## 2025-07-13 PROCEDURE — 6370000000 HC RX 637 (ALT 250 FOR IP)

## 2025-07-13 PROCEDURE — 2500000003 HC RX 250 WO HCPCS

## 2025-07-13 PROCEDURE — 87040 BLOOD CULTURE FOR BACTERIA: CPT

## 2025-07-13 PROCEDURE — 71045 X-RAY EXAM CHEST 1 VIEW: CPT

## 2025-07-13 PROCEDURE — 6360000002 HC RX W HCPCS

## 2025-07-13 PROCEDURE — 96365 THER/PROPH/DIAG IV INF INIT: CPT

## 2025-07-13 PROCEDURE — 84295 ASSAY OF SERUM SODIUM: CPT

## 2025-07-13 PROCEDURE — 51798 US URINE CAPACITY MEASURE: CPT

## 2025-07-13 PROCEDURE — 87086 URINE CULTURE/COLONY COUNT: CPT

## 2025-07-13 PROCEDURE — 82962 GLUCOSE BLOOD TEST: CPT

## 2025-07-13 PROCEDURE — 82947 ASSAY GLUCOSE BLOOD QUANT: CPT

## 2025-07-13 PROCEDURE — 85025 COMPLETE CBC W/AUTO DIFF WBC: CPT

## 2025-07-13 PROCEDURE — 2700000000 HC OXYGEN THERAPY PER DAY

## 2025-07-13 PROCEDURE — 2580000003 HC RX 258: Performed by: EMERGENCY MEDICINE

## 2025-07-13 PROCEDURE — 97530 THERAPEUTIC ACTIVITIES: CPT

## 2025-07-13 PROCEDURE — 2580000003 HC RX 258

## 2025-07-13 PROCEDURE — 99222 1ST HOSP IP/OBS MODERATE 55: CPT | Performed by: INTERNAL MEDICINE

## 2025-07-13 PROCEDURE — 81001 URINALYSIS AUTO W/SCOPE: CPT

## 2025-07-13 PROCEDURE — 97161 PT EVAL LOW COMPLEX 20 MIN: CPT

## 2025-07-13 PROCEDURE — 93005 ELECTROCARDIOGRAM TRACING: CPT | Performed by: EMERGENCY MEDICINE

## 2025-07-13 PROCEDURE — 70450 CT HEAD/BRAIN W/O DYE: CPT

## 2025-07-13 PROCEDURE — 84145 PROCALCITONIN (PCT): CPT

## 2025-07-13 PROCEDURE — 82140 ASSAY OF AMMONIA: CPT

## 2025-07-13 PROCEDURE — 94761 N-INVAS EAR/PLS OXIMETRY MLT: CPT

## 2025-07-13 RX ORDER — ACETAMINOPHEN 325 MG/1
650 TABLET ORAL EVERY 6 HOURS PRN
Status: DISCONTINUED | OUTPATIENT
Start: 2025-07-13 | End: 2025-07-18 | Stop reason: HOSPADM

## 2025-07-13 RX ORDER — SODIUM CHLORIDE, SODIUM LACTATE, POTASSIUM CHLORIDE, CALCIUM CHLORIDE 600; 310; 30; 20 MG/100ML; MG/100ML; MG/100ML; MG/100ML
INJECTION, SOLUTION INTRAVENOUS CONTINUOUS
Status: DISCONTINUED | OUTPATIENT
Start: 2025-07-13 | End: 2025-07-15

## 2025-07-13 RX ORDER — ASPIRIN 81 MG/1
81 TABLET, CHEWABLE ORAL EVERY MORNING
Status: DISCONTINUED | OUTPATIENT
Start: 2025-07-13 | End: 2025-07-18 | Stop reason: HOSPADM

## 2025-07-13 RX ORDER — 0.9 % SODIUM CHLORIDE 0.9 %
30 INTRAVENOUS SOLUTION INTRAVENOUS ONCE
Status: COMPLETED | OUTPATIENT
Start: 2025-07-13 | End: 2025-07-13

## 2025-07-13 RX ORDER — INDOMETHACIN 25 MG/1
50 CAPSULE ORAL
Status: DISPENSED | OUTPATIENT
Start: 2025-07-13 | End: 2025-07-13

## 2025-07-13 RX ORDER — CLOPIDOGREL BISULFATE 75 MG/1
75 TABLET ORAL DAILY
COMMUNITY

## 2025-07-13 RX ORDER — ONDANSETRON 4 MG/1
4 TABLET, ORALLY DISINTEGRATING ORAL EVERY 8 HOURS PRN
Status: DISCONTINUED | OUTPATIENT
Start: 2025-07-13 | End: 2025-07-18 | Stop reason: HOSPADM

## 2025-07-13 RX ORDER — POTASSIUM CHLORIDE 1500 MG/1
40 TABLET, EXTENDED RELEASE ORAL PRN
Status: DISCONTINUED | OUTPATIENT
Start: 2025-07-13 | End: 2025-07-18 | Stop reason: HOSPADM

## 2025-07-13 RX ORDER — ENOXAPARIN SODIUM 100 MG/ML
30 INJECTION SUBCUTANEOUS 2 TIMES DAILY
Status: DISCONTINUED | OUTPATIENT
Start: 2025-07-13 | End: 2025-07-18 | Stop reason: HOSPADM

## 2025-07-13 RX ORDER — SODIUM CHLORIDE 9 MG/ML
INJECTION, SOLUTION INTRAVENOUS PRN
Status: DISCONTINUED | OUTPATIENT
Start: 2025-07-13 | End: 2025-07-18 | Stop reason: HOSPADM

## 2025-07-13 RX ORDER — ACETAMINOPHEN 650 MG/1
650 SUPPOSITORY RECTAL EVERY 6 HOURS PRN
Status: DISCONTINUED | OUTPATIENT
Start: 2025-07-13 | End: 2025-07-18 | Stop reason: HOSPADM

## 2025-07-13 RX ORDER — ALBUTEROL SULFATE 90 UG/1
2 INHALANT RESPIRATORY (INHALATION) EVERY 6 HOURS PRN
Status: DISCONTINUED | OUTPATIENT
Start: 2025-07-13 | End: 2025-07-18 | Stop reason: HOSPADM

## 2025-07-13 RX ORDER — SODIUM CHLORIDE 0.9 % (FLUSH) 0.9 %
5-40 SYRINGE (ML) INJECTION EVERY 12 HOURS SCHEDULED
Status: DISCONTINUED | OUTPATIENT
Start: 2025-07-13 | End: 2025-07-18 | Stop reason: HOSPADM

## 2025-07-13 RX ORDER — ONDANSETRON 2 MG/ML
4 INJECTION INTRAMUSCULAR; INTRAVENOUS EVERY 6 HOURS PRN
Status: DISCONTINUED | OUTPATIENT
Start: 2025-07-13 | End: 2025-07-18 | Stop reason: HOSPADM

## 2025-07-13 RX ORDER — SODIUM CHLORIDE 0.9 % (FLUSH) 0.9 %
5-40 SYRINGE (ML) INJECTION PRN
Status: DISCONTINUED | OUTPATIENT
Start: 2025-07-13 | End: 2025-07-18 | Stop reason: HOSPADM

## 2025-07-13 RX ORDER — TAMSULOSIN HYDROCHLORIDE 0.4 MG/1
0.4 CAPSULE ORAL DAILY
Status: DISCONTINUED | OUTPATIENT
Start: 2025-07-13 | End: 2025-07-18 | Stop reason: HOSPADM

## 2025-07-13 RX ORDER — TRAZODONE HYDROCHLORIDE 50 MG/1
100 TABLET ORAL NIGHTLY
Status: DISCONTINUED | OUTPATIENT
Start: 2025-07-13 | End: 2025-07-18 | Stop reason: HOSPADM

## 2025-07-13 RX ORDER — MAGNESIUM SULFATE IN WATER 40 MG/ML
2000 INJECTION, SOLUTION INTRAVENOUS PRN
Status: DISCONTINUED | OUTPATIENT
Start: 2025-07-13 | End: 2025-07-18 | Stop reason: HOSPADM

## 2025-07-13 RX ORDER — BUDESONIDE AND FORMOTEROL FUMARATE DIHYDRATE 80; 4.5 UG/1; UG/1
2 AEROSOL RESPIRATORY (INHALATION)
Status: DISCONTINUED | OUTPATIENT
Start: 2025-07-13 | End: 2025-07-18 | Stop reason: HOSPADM

## 2025-07-13 RX ORDER — POLYETHYLENE GLYCOL 3350 17 G/17G
17 POWDER, FOR SOLUTION ORAL DAILY PRN
Status: DISCONTINUED | OUTPATIENT
Start: 2025-07-13 | End: 2025-07-18 | Stop reason: HOSPADM

## 2025-07-13 RX ORDER — POTASSIUM CHLORIDE 1500 MG/1
40 TABLET, EXTENDED RELEASE ORAL ONCE
Status: COMPLETED | OUTPATIENT
Start: 2025-07-13 | End: 2025-07-13

## 2025-07-13 RX ORDER — PANTOPRAZOLE SODIUM 20 MG/1
20 TABLET, DELAYED RELEASE ORAL
Status: DISCONTINUED | OUTPATIENT
Start: 2025-07-13 | End: 2025-07-18 | Stop reason: HOSPADM

## 2025-07-13 RX ORDER — POTASSIUM CHLORIDE 7.45 MG/ML
10 INJECTION INTRAVENOUS PRN
Status: DISCONTINUED | OUTPATIENT
Start: 2025-07-13 | End: 2025-07-18 | Stop reason: HOSPADM

## 2025-07-13 RX ORDER — PRAVASTATIN SODIUM 40 MG
40 TABLET ORAL NIGHTLY
Status: DISCONTINUED | OUTPATIENT
Start: 2025-07-13 | End: 2025-07-18 | Stop reason: HOSPADM

## 2025-07-13 RX ADMIN — SODIUM CHLORIDE, SODIUM LACTATE, POTASSIUM CHLORIDE, AND CALCIUM CHLORIDE: .6; .31; .03; .02 INJECTION, SOLUTION INTRAVENOUS at 06:09

## 2025-07-13 RX ADMIN — Medication 2 PUFF: at 20:12

## 2025-07-13 RX ADMIN — SODIUM BICARBONATE 50 MEQ: 84 INJECTION, SOLUTION INTRAVENOUS at 04:53

## 2025-07-13 RX ADMIN — PANTOPRAZOLE SODIUM 20 MG: 20 TABLET, DELAYED RELEASE ORAL at 06:18

## 2025-07-13 RX ADMIN — VANCOMYCIN HYDROCHLORIDE 2500 MG: 1.25 INJECTION, POWDER, LYOPHILIZED, FOR SOLUTION INTRAVENOUS at 02:43

## 2025-07-13 RX ADMIN — TAMSULOSIN HYDROCHLORIDE 0.4 MG: 0.4 CAPSULE ORAL at 09:26

## 2025-07-13 RX ADMIN — PIPERACILLIN AND TAZOBACTAM 4500 MG: 4; .5 INJECTION, POWDER, LYOPHILIZED, FOR SOLUTION INTRAVENOUS at 01:28

## 2025-07-13 RX ADMIN — PRAVASTATIN SODIUM 40 MG: 40 TABLET ORAL at 20:56

## 2025-07-13 RX ADMIN — ENOXAPARIN SODIUM 30 MG: 100 INJECTION SUBCUTANEOUS at 20:55

## 2025-07-13 RX ADMIN — POTASSIUM CHLORIDE 40 MEQ: 1500 TABLET, EXTENDED RELEASE ORAL at 04:53

## 2025-07-13 RX ADMIN — SODIUM CHLORIDE 2000 ML: 0.9 INJECTION, SOLUTION INTRAVENOUS at 01:27

## 2025-07-13 RX ADMIN — TRAZODONE HYDROCHLORIDE 100 MG: 50 TABLET ORAL at 20:57

## 2025-07-13 RX ADMIN — ENOXAPARIN SODIUM 30 MG: 100 INJECTION SUBCUTANEOUS at 09:26

## 2025-07-13 RX ADMIN — Medication 5 MCG: at 08:18

## 2025-07-13 RX ADMIN — Medication 2 PUFF: at 08:18

## 2025-07-13 RX ADMIN — ACETAMINOPHEN 650 MG: 325 TABLET ORAL at 23:33

## 2025-07-13 RX ADMIN — PIPERACILLIN AND TAZOBACTAM 3375 MG: 3; .375 INJECTION, POWDER, LYOPHILIZED, FOR SOLUTION INTRAVENOUS at 15:02

## 2025-07-13 RX ADMIN — FLUOXETINE HYDROCHLORIDE 40 MG: 20 CAPSULE ORAL at 20:56

## 2025-07-13 RX ADMIN — ASPIRIN 81 MG: 81 TABLET, CHEWABLE ORAL at 09:26

## 2025-07-13 RX ADMIN — PIPERACILLIN AND TAZOBACTAM 3375 MG: 3; .375 INJECTION, POWDER, LYOPHILIZED, FOR SOLUTION INTRAVENOUS at 06:14

## 2025-07-13 RX ADMIN — SODIUM CHLORIDE, PRESERVATIVE FREE 10 ML: 5 INJECTION INTRAVENOUS at 09:26

## 2025-07-13 RX ADMIN — SODIUM CHLORIDE, SODIUM LACTATE, POTASSIUM CHLORIDE, AND CALCIUM CHLORIDE: .6; .31; .03; .02 INJECTION, SOLUTION INTRAVENOUS at 19:37

## 2025-07-13 RX ADMIN — SODIUM CHLORIDE, PRESERVATIVE FREE 10 ML: 5 INJECTION INTRAVENOUS at 20:57

## 2025-07-13 RX ADMIN — PIPERACILLIN AND TAZOBACTAM 3375 MG: 3; .375 INJECTION, POWDER, LYOPHILIZED, FOR SOLUTION INTRAVENOUS at 23:30

## 2025-07-13 ASSESSMENT — PAIN SCALES - GENERAL: PAINLEVEL_OUTOF10: 4

## 2025-07-13 ASSESSMENT — LIFESTYLE VARIABLES
HOW MANY STANDARD DRINKS CONTAINING ALCOHOL DO YOU HAVE ON A TYPICAL DAY: PATIENT DOES NOT DRINK
HOW OFTEN DO YOU HAVE A DRINK CONTAINING ALCOHOL: NEVER

## 2025-07-13 ASSESSMENT — PAIN DESCRIPTION - LOCATION: LOCATION: GENERALIZED

## 2025-07-13 NOTE — ED NOTES
Bladder scan performed and 245mL noted in bladder. MD made aware. No new orders at this time.   
Pt cleaned up at this time and linen changed without issue  
Pt more alert and oriented x3 at this time. Unsure of year.   
of Consciousness (1a): Alert  LOC Questions (1b): Answers both correctly  LOC Commands (1c): Performs both tasks correctly  Best Gaze (2): Normal  Visual (3): No visual loss  Facial Palsy (4): Normal symmetrical movement  Motor Arm, Left (5a): No drift  Motor Arm, Right (5b): No drift  Motor Leg, Left (6a): No drift  Motor Leg, Right (6b): No drift  Limb Ataxia (7): Absent  Sensory (8): Normal  Best Language (9): No aphasia  Dysarthria (10): Normal  Extinction and Inattention (11): No abnormality  Total: 0   Active LDA's:   Peripheral IV 07/13/25 Left Antecubital (Active)       Peripheral IV 07/13/25 Left Hand (Active)     Active Central Lines:                          Active Wounds:    Active Garay's:    Active Feeding Tubes:      Administered Medications:   Medications   potassium chloride (KLOR-CON M) extended release tablet 40 mEq (has no administration in time range)   sodium bicarbonate 8.4 % IntraVENous 50 mEq (has no administration in time range)   lactated ringers infusion (has no administration in time range)   aspirin chewable tablet 81 mg (has no administration in time range)   FLUoxetine (PROZAC) capsule 40 mg (has no administration in time range)   fluticasone-umeclidin-vilant (TRELEGY ELLIPTA) 100-62.5-25 MCG/ACT inhaler 1 puff (has no administration in time range)   pantoprazole (PROTONIX) tablet 20 mg (has no administration in time range)   pravastatin (PRAVACHOL) tablet 40 mg (has no administration in time range)   traZODone (DESYREL) tablet 100 mg (has no administration in time range)   sodium chloride flush 0.9 % injection 5-40 mL (has no administration in time range)   sodium chloride flush 0.9 % injection 5-40 mL (has no administration in time range)   0.9 % sodium chloride infusion (has no administration in time range)   potassium chloride (KLOR-CON M) extended release tablet 40 mEq (has no administration in time range)     Or   potassium bicarb-citric acid (EFFER-K) effervescent tablet 40 mEq

## 2025-07-13 NOTE — ED TRIAGE NOTES
Pt BIB EMS for altered mental status after fall and covered in fecal matter. Abnormal EKG with EMS. LKW 1 month ago per daughter but also reports seeing dad yesterday per EMS    Glucose 96  
Constantino Carroll LMSW

## 2025-07-13 NOTE — H&P
LAB DATA REVIEWED:    Recent Results (from the past 12 hours)   CBC with Auto Differential    Collection Time: 07/13/25  1:08 AM   Result Value Ref Range    WBC 9.6 4.1 - 11.1 K/uL    RBC 4.09 (L) 4.10 - 5.70 M/uL    Hemoglobin 13.6 12.1 - 17.0 g/dL    Hematocrit 40.0 36.6 - 50.3 %    MCV 97.8 80.0 - 99.0 FL    MCH 33.3 26.0 - 34.0 PG    MCHC 34.0 30.0 - 36.5 g/dL    RDW 12.6 11.5 - 14.5 %    Platelets 154 150 - 400 K/uL    MPV 10.8 8.9 - 12.9 FL    Nucleated RBCs 0.0 0.0  WBC    nRBC 0.00 0.00 - 0.01 K/uL    Neutrophils % 87.0 (H) 32 - 75 %    Band Neutrophils 1 0 - 6 %    Lymphocytes % 7.0 (L) 12 - 49 %    Monocytes % 5.0 5 - 13 %    Eosinophils % 0.0 0 - 7 %    Basophils % 0.0 0 - 1 %    Immature Granulocytes % 0.0 %    Neutrophils Absolute 8.45 (H) 1.8 - 8.0 K/UL    Lymphocytes Absolute 0.67 (L) 0.8 - 3.5 K/UL    Monocytes Absolute 0.48 0.0 - 1.0 K/UL    Eosinophils Absolute 0.00 0.0 - 0.4 K/UL    Basophils Absolute 0.00 0.0 - 0.1 K/UL    Immature Granulocytes Absolute 0.00 K/UL    Differential Type Manual      RBC Comment Normocytic, Normochromic     Comprehensive Metabolic Panel    Collection Time: 07/13/25  1:08 AM   Result Value Ref Range    Sodium 140 136 - 145 mmol/L    Potassium 3.2 (L) 3.5 - 5.1 mmol/L    Chloride 105 97 - 108 mmol/L    CO2 17 (L) 21 - 32 mmol/L    Anion Gap 18 (H) 2 - 12 mmol/L    Glucose 102 (H) 65 - 100 mg/dL    BUN 12 6 - 20 mg/dL    Creatinine 2.25 (H) 0.70 - 1.30 mg/dL    BUN/Creatinine Ratio 5 (L) 12 - 20      Est, Glom Filt Rate 29 (L) >60 ml/min/1.73m2    Calcium 9.2 8.5 - 10.1 mg/dL    Total Bilirubin 1.2 (H) 0.2 - 1.0 mg/dL    AST 36 15 - 37 U/L    ALT 19 12 - 78 U/L    Alk Phosphatase 164 (H) 45 - 117 U/L    Total Protein 6.8 6.4 - 8.2 g/dL    Albumin 2.9 (L) 3.5 - 5.0 g/dL    Globulin 3.9 2.0 - 4.0 g/dL    Albumin/Globulin Ratio 0.7 (L) 1.1 - 2.2     COVID-19 & Influenza Combo    Collection Time: 07/13/25  1:08 AM    Specimen: Nasopharyngeal   Result Value

## 2025-07-13 NOTE — ED PROVIDER NOTES
ondansetron (ZOFRAN) injection 4 mg (has no administration in time range)   polyethylene glycol (GLYCOLAX) packet 17 g (has no administration in time range)   acetaminophen (TYLENOL) tablet 650 mg (has no administration in time range)     Or   acetaminophen (TYLENOL) suppository 650 mg (has no administration in time range)   piperacillin-tazobactam (ZOSYN) 3,375 mg in sodium chloride 0.9 % 50 mL IVPB (addEASE) (has no administration in time range)   sodium chloride 0.9 % bolus 2,052 mL (0 mLs IntraVENous Stopped 7/13/25 0233)   piperacillin-tazobactam (ZOSYN) 4,500 mg in sodium chloride 0.9 % 100 mL IVPB (addEASE) (0 mg IntraVENous Stopped 7/13/25 0233)   vancomycin (VANCOCIN) 2,500 mg in sodium chloride 0.9 % 500 mL IVPB (0 mg IntraVENous Stopped 7/13/25 0454)   potassium chloride (KLOR-CON M) extended release tablet 40 mEq (40 mEq Oral Given 7/13/25 0453)       CONSULTS: See ED Course/MDM for further details.  IP CONSULT TO NEPHROLOGY   PROCEDURES   Unless otherwise noted above, none  Procedures    SEPSIS REASSESSMENT & CRITICAL CARE TIME   SEPSIS REASSESSMENT: Sepsis Reassessment:    The patient initially met SIRS criteria with a suspected source of Sepsis of Unknown Origin. Cultures and antibiotics were initiated sequentially as per orders.   Fluid resuscitation volume with the FULL 30ml/kg crystalloid bolus was: GIVEN: the patient received the FULL 30ml/kg bolus based on Ideal body weight for BMI >30  I have performed a sepsis reassessment of the patient's clinical volume status and tissue perfusion at TIME: 521 AM, and DATE: 7/13, and the patient is adequately resuscitated.    Critical Care Time: There was a high probability of life-threatening clinical deterioration in the patient's condition requiring my urgent intervention.  I personally saw the patient and independently provided 35 minutes of non-concurrent critical care out of the total shared critical care time provided, excluding separately reportable

## 2025-07-14 ENCOUNTER — APPOINTMENT (OUTPATIENT)
Facility: HOSPITAL | Age: 75
DRG: 871 | End: 2025-07-14
Payer: MEDICARE

## 2025-07-14 LAB
ALBUMIN SERPL-MCNC: 2.3 G/DL (ref 3.5–5)
ANION GAP SERPL CALC-SCNC: 6 MMOL/L (ref 2–12)
BACTERIA SPEC CULT: NORMAL
BASOPHILS # BLD: 0 K/UL (ref 0–0.1)
BASOPHILS NFR BLD: 0 % (ref 0–1)
BUN SERPL-MCNC: 23 MG/DL (ref 6–20)
BUN/CREAT SERPL: 10 (ref 12–20)
CA-I BLD-MCNC: 8.4 MG/DL (ref 8.5–10.1)
CHLORIDE SERPL-SCNC: 106 MMOL/L (ref 97–108)
CO2 SERPL-SCNC: 28 MMOL/L (ref 21–32)
CREAT SERPL-MCNC: 2.41 MG/DL (ref 0.7–1.3)
DIFFERENTIAL METHOD BLD: ABNORMAL
EKG ATRIAL RATE: 115 BPM
EKG DIAGNOSIS: NORMAL
EKG Q-T INTERVAL: 340 MS
EKG QRS DURATION: 82 MS
EKG QTC CALCULATION (BAZETT): 438 MS
EKG R AXIS: 62 DEGREES
EKG T AXIS: 47 DEGREES
EKG VENTRICULAR RATE: 100 BPM
EOSINOPHIL # BLD: 0.35 K/UL (ref 0–0.4)
EOSINOPHIL NFR BLD: 2 % (ref 0–7)
ERYTHROCYTE [DISTWIDTH] IN BLOOD BY AUTOMATED COUNT: 13 % (ref 11.5–14.5)
GLUCOSE SERPL-MCNC: 84 MG/DL (ref 65–100)
HCT VFR BLD AUTO: 30.8 % (ref 36.6–50.3)
HGB BLD-MCNC: 10.3 G/DL (ref 12.1–17)
IMM GRANULOCYTES # BLD AUTO: 0 K/UL
IMM GRANULOCYTES NFR BLD AUTO: 0 %
LYMPHOCYTES # BLD: 1.58 K/UL (ref 0.8–3.5)
LYMPHOCYTES NFR BLD: 9 % (ref 12–49)
Lab: NORMAL
MAGNESIUM SERPL-MCNC: 2.1 MG/DL (ref 1.6–2.4)
MCH RBC QN AUTO: 33.1 PG (ref 26–34)
MCHC RBC AUTO-ENTMCNC: 33.4 G/DL (ref 30–36.5)
MCV RBC AUTO: 99 FL (ref 80–99)
MONOCYTES # BLD: 1.06 K/UL (ref 0–1)
MONOCYTES NFR BLD: 6 % (ref 5–13)
NEUTS BAND NFR BLD MANUAL: 12 % (ref 0–6)
NEUTS SEG # BLD: 14.61 K/UL (ref 1.8–8)
NEUTS SEG NFR BLD: 71 % (ref 32–75)
NRBC # BLD: 0 K/UL (ref 0–0.01)
NRBC BLD-RTO: 0 PER 100 WBC
PHOSPHATE SERPL-MCNC: 3 MG/DL (ref 2.6–4.7)
PLATELET # BLD AUTO: 90 K/UL (ref 150–400)
PMV BLD AUTO: 11.9 FL (ref 8.9–12.9)
POTASSIUM SERPL-SCNC: 3.6 MMOL/L (ref 3.5–5.1)
RBC # BLD AUTO: 3.11 M/UL (ref 4.1–5.7)
RBC MORPH BLD: ABNORMAL
SODIUM SERPL-SCNC: 140 MMOL/L (ref 136–145)
WBC # BLD AUTO: 17.6 K/UL (ref 4.1–11.1)

## 2025-07-14 PROCEDURE — 94640 AIRWAY INHALATION TREATMENT: CPT

## 2025-07-14 PROCEDURE — 99232 SBSQ HOSP IP/OBS MODERATE 35: CPT | Performed by: INTERNAL MEDICINE

## 2025-07-14 PROCEDURE — 83735 ASSAY OF MAGNESIUM: CPT

## 2025-07-14 PROCEDURE — 6370000000 HC RX 637 (ALT 250 FOR IP)

## 2025-07-14 PROCEDURE — 36415 COLL VENOUS BLD VENIPUNCTURE: CPT

## 2025-07-14 PROCEDURE — 76770 US EXAM ABDO BACK WALL COMP: CPT

## 2025-07-14 PROCEDURE — 85025 COMPLETE CBC W/AUTO DIFF WBC: CPT

## 2025-07-14 PROCEDURE — 6360000002 HC RX W HCPCS: Performed by: STUDENT IN AN ORGANIZED HEALTH CARE EDUCATION/TRAINING PROGRAM

## 2025-07-14 PROCEDURE — 6360000002 HC RX W HCPCS

## 2025-07-14 PROCEDURE — 1100000000 HC RM PRIVATE

## 2025-07-14 PROCEDURE — 2700000000 HC OXYGEN THERAPY PER DAY

## 2025-07-14 PROCEDURE — 94761 N-INVAS EAR/PLS OXIMETRY MLT: CPT

## 2025-07-14 PROCEDURE — 2500000003 HC RX 250 WO HCPCS

## 2025-07-14 PROCEDURE — 80069 RENAL FUNCTION PANEL: CPT

## 2025-07-14 PROCEDURE — 2580000003 HC RX 258

## 2025-07-14 RX ORDER — MAGNESIUM HYDROXIDE/ALUMINUM HYDROXICE/SIMETHICONE 120; 1200; 1200 MG/30ML; MG/30ML; MG/30ML
30 SUSPENSION ORAL EVERY 6 HOURS PRN
Status: DISCONTINUED | OUTPATIENT
Start: 2025-07-14 | End: 2025-07-18 | Stop reason: HOSPADM

## 2025-07-14 RX ORDER — MORPHINE SULFATE 2 MG/ML
1 INJECTION, SOLUTION INTRAMUSCULAR; INTRAVENOUS EVERY 4 HOURS PRN
Status: DISCONTINUED | OUTPATIENT
Start: 2025-07-14 | End: 2025-07-18 | Stop reason: HOSPADM

## 2025-07-14 RX ADMIN — ENOXAPARIN SODIUM 30 MG: 100 INJECTION SUBCUTANEOUS at 20:28

## 2025-07-14 RX ADMIN — ACETAMINOPHEN 650 MG: 325 TABLET ORAL at 11:13

## 2025-07-14 RX ADMIN — ENOXAPARIN SODIUM 30 MG: 100 INJECTION SUBCUTANEOUS at 10:26

## 2025-07-14 RX ADMIN — SODIUM CHLORIDE, PRESERVATIVE FREE 10 ML: 5 INJECTION INTRAVENOUS at 10:27

## 2025-07-14 RX ADMIN — Medication 2 PUFF: at 20:50

## 2025-07-14 RX ADMIN — PIPERACILLIN AND TAZOBACTAM 3375 MG: 3; .375 INJECTION, POWDER, LYOPHILIZED, FOR SOLUTION INTRAVENOUS at 23:28

## 2025-07-14 RX ADMIN — PRAVASTATIN SODIUM 40 MG: 40 TABLET ORAL at 20:28

## 2025-07-14 RX ADMIN — SODIUM CHLORIDE, SODIUM LACTATE, POTASSIUM CHLORIDE, AND CALCIUM CHLORIDE: .6; .31; .03; .02 INJECTION, SOLUTION INTRAVENOUS at 10:32

## 2025-07-14 RX ADMIN — PIPERACILLIN AND TAZOBACTAM 3375 MG: 3; .375 INJECTION, POWDER, LYOPHILIZED, FOR SOLUTION INTRAVENOUS at 14:59

## 2025-07-14 RX ADMIN — Medication 2 PUFF: at 08:33

## 2025-07-14 RX ADMIN — PIPERACILLIN AND TAZOBACTAM 3375 MG: 3; .375 INJECTION, POWDER, LYOPHILIZED, FOR SOLUTION INTRAVENOUS at 06:30

## 2025-07-14 RX ADMIN — ASPIRIN 81 MG: 81 TABLET, CHEWABLE ORAL at 10:26

## 2025-07-14 RX ADMIN — TRAZODONE HYDROCHLORIDE 100 MG: 50 TABLET ORAL at 20:28

## 2025-07-14 RX ADMIN — SODIUM CHLORIDE, PRESERVATIVE FREE 10 ML: 5 INJECTION INTRAVENOUS at 20:28

## 2025-07-14 RX ADMIN — TAMSULOSIN HYDROCHLORIDE 0.4 MG: 0.4 CAPSULE ORAL at 10:26

## 2025-07-14 RX ADMIN — FLUOXETINE HYDROCHLORIDE 40 MG: 20 CAPSULE ORAL at 20:28

## 2025-07-14 RX ADMIN — SODIUM CHLORIDE, SODIUM LACTATE, POTASSIUM CHLORIDE, AND CALCIUM CHLORIDE: .6; .31; .03; .02 INJECTION, SOLUTION INTRAVENOUS at 23:21

## 2025-07-14 RX ADMIN — PANTOPRAZOLE SODIUM 20 MG: 20 TABLET, DELAYED RELEASE ORAL at 06:30

## 2025-07-14 RX ADMIN — Medication 5 MCG: at 08:33

## 2025-07-14 RX ADMIN — MORPHINE SULFATE 1 MG: 2 INJECTION, SOLUTION INTRAMUSCULAR; INTRAVENOUS at 14:27

## 2025-07-14 ASSESSMENT — PAIN DESCRIPTION - ORIENTATION
ORIENTATION: RIGHT;LEFT
ORIENTATION: RIGHT;LEFT

## 2025-07-14 ASSESSMENT — PAIN SCALES - GENERAL
PAINLEVEL_OUTOF10: 6
PAINLEVEL_OUTOF10: 4
PAINLEVEL_OUTOF10: 0
PAINLEVEL_OUTOF10: 9
PAINLEVEL_OUTOF10: 0

## 2025-07-14 ASSESSMENT — PAIN DESCRIPTION - LOCATION
LOCATION: FACE;NECK
LOCATION: JAW

## 2025-07-14 ASSESSMENT — PAIN DESCRIPTION - DESCRIPTORS
DESCRIPTORS: ACHING;STABBING
DESCRIPTORS: ACHING

## 2025-07-14 ASSESSMENT — PAIN SCALES - WONG BAKER
WONGBAKER_NUMERICALRESPONSE: NO HURT
WONGBAKER_NUMERICALRESPONSE: NO HURT

## 2025-07-14 NOTE — CARE COORDINATION
07/14/25 1109   Service Assessment   Patient Orientation Alert and Oriented   Cognition Alert   History Provided By Patient   Primary Caregiver Self   Accompanied By/Relationship alone   Support Systems Children   Patient's Healthcare Decision Maker is: Named in Scanned ACP Document   PCP Verified by CM Yes   Last Visit to PCP Within last 6 months   Prior Functional Level Independent in ADLs/IADLs   Current Functional Level Assistance with the following:;Mobility   Can patient return to prior living arrangement Yes   Ability to make needs known: Good   Family able to assist with home care needs: Yes   Would you like for me to discuss the discharge plan with any other family members/significant others, and if so, who? Yes   Financial Resources Medicare   Community Resources None     Lives at address on file with son and daughter. States no DME, independent with care. Reports driving/no transport issues. No prior home health or SNF/IRF. Uses [facility] for pharmacy.     PT recommendations for SNF. Patient declined currently. Plans are for home. Will follow up closer to discharge to see if patient changes his mind.     Advance Care Planning   Healthcare Decision Maker:    Primary Decision Maker: JIN MOODY - Child - [phone]    Primary Decision Maker: MIRIAM MOODY - Child - [phone]    Click here to complete Healthcare Decision Makers including selection of the Healthcare Decision Maker Relationship (ie \"Primary\").

## 2025-07-15 ENCOUNTER — APPOINTMENT (OUTPATIENT)
Facility: HOSPITAL | Age: 75
DRG: 871 | End: 2025-07-15
Payer: MEDICARE

## 2025-07-15 LAB
ANION GAP SERPL CALC-SCNC: 8 MMOL/L (ref 2–12)
BASOPHILS # BLD: 0.12 K/UL (ref 0–0.1)
BASOPHILS NFR BLD: 1 % (ref 0–1)
BUN SERPL-MCNC: 23 MG/DL (ref 6–20)
BUN/CREAT SERPL: 10 (ref 12–20)
CA-I BLD-MCNC: 8.5 MG/DL (ref 8.5–10.1)
CHLORIDE SERPL-SCNC: 108 MMOL/L (ref 97–108)
CO2 SERPL-SCNC: 24 MMOL/L (ref 21–32)
CREAT SERPL-MCNC: 2.25 MG/DL (ref 0.7–1.3)
CRP SERPL-MCNC: 12.1 MG/DL (ref 0–0.3)
DIFFERENTIAL METHOD BLD: ABNORMAL
EOSINOPHIL # BLD: 0.35 K/UL (ref 0–0.4)
EOSINOPHIL NFR BLD: 3 % (ref 0–7)
ERYTHROCYTE [DISTWIDTH] IN BLOOD BY AUTOMATED COUNT: 12.8 % (ref 11.5–14.5)
ERYTHROCYTE [SEDIMENTATION RATE] IN BLOOD: 60 MM/HR (ref 0–20)
GLUCOSE BLD STRIP.AUTO-MCNC: 134 MG/DL (ref 65–100)
GLUCOSE SERPL-MCNC: 106 MG/DL (ref 65–100)
HCT VFR BLD AUTO: 29.5 % (ref 36.6–50.3)
HGB BLD-MCNC: 10.2 G/DL (ref 12.1–17)
IMM GRANULOCYTES # BLD AUTO: 0 K/UL
IMM GRANULOCYTES NFR BLD AUTO: 0 %
LYMPHOCYTES # BLD: 0.58 K/UL (ref 0.8–3.5)
LYMPHOCYTES NFR BLD: 5 % (ref 12–49)
MCH RBC QN AUTO: 33 PG (ref 26–34)
MCHC RBC AUTO-ENTMCNC: 34.6 G/DL (ref 30–36.5)
MCV RBC AUTO: 95.5 FL (ref 80–99)
MONOCYTES # BLD: 0.69 K/UL (ref 0–1)
MONOCYTES NFR BLD: 6 % (ref 5–13)
NEUTS SEG # BLD: 9.76 K/UL (ref 1.8–8)
NEUTS SEG NFR BLD: 85 % (ref 32–75)
NRBC # BLD: 0 K/UL (ref 0–0.01)
NRBC BLD-RTO: 0 PER 100 WBC
PERFORMED BY:: ABNORMAL
PLATELET # BLD AUTO: 97 K/UL (ref 150–400)
PMV BLD AUTO: 11.5 FL (ref 8.9–12.9)
POTASSIUM SERPL-SCNC: 3.3 MMOL/L (ref 3.5–5.1)
PROCALCITONIN SERPL-MCNC: 41.58 NG/ML
RBC # BLD AUTO: 3.09 M/UL (ref 4.1–5.7)
RBC MORPH BLD: ABNORMAL
SODIUM SERPL-SCNC: 140 MMOL/L (ref 136–145)
WBC # BLD AUTO: 11.5 K/UL (ref 4.1–11.1)

## 2025-07-15 PROCEDURE — 93005 ELECTROCARDIOGRAM TRACING: CPT | Performed by: STUDENT IN AN ORGANIZED HEALTH CARE EDUCATION/TRAINING PROGRAM

## 2025-07-15 PROCEDURE — 6370000000 HC RX 637 (ALT 250 FOR IP)

## 2025-07-15 PROCEDURE — 94640 AIRWAY INHALATION TREATMENT: CPT

## 2025-07-15 PROCEDURE — 73700 CT LOWER EXTREMITY W/O DYE: CPT

## 2025-07-15 PROCEDURE — 80048 BASIC METABOLIC PNL TOTAL CA: CPT

## 2025-07-15 PROCEDURE — 6360000002 HC RX W HCPCS: Performed by: STUDENT IN AN ORGANIZED HEALTH CARE EDUCATION/TRAINING PROGRAM

## 2025-07-15 PROCEDURE — 82962 GLUCOSE BLOOD TEST: CPT

## 2025-07-15 PROCEDURE — 72125 CT NECK SPINE W/O DYE: CPT

## 2025-07-15 PROCEDURE — 6360000002 HC RX W HCPCS

## 2025-07-15 PROCEDURE — 94761 N-INVAS EAR/PLS OXIMETRY MLT: CPT

## 2025-07-15 PROCEDURE — 36415 COLL VENOUS BLD VENIPUNCTURE: CPT

## 2025-07-15 PROCEDURE — 99232 SBSQ HOSP IP/OBS MODERATE 35: CPT | Performed by: INTERNAL MEDICINE

## 2025-07-15 PROCEDURE — 2580000003 HC RX 258

## 2025-07-15 PROCEDURE — 84145 PROCALCITONIN (PCT): CPT

## 2025-07-15 PROCEDURE — 85652 RBC SED RATE AUTOMATED: CPT

## 2025-07-15 PROCEDURE — 2500000003 HC RX 250 WO HCPCS

## 2025-07-15 PROCEDURE — 86140 C-REACTIVE PROTEIN: CPT

## 2025-07-15 PROCEDURE — 1100000000 HC RM PRIVATE

## 2025-07-15 PROCEDURE — 85025 COMPLETE CBC W/AUTO DIFF WBC: CPT

## 2025-07-15 PROCEDURE — 97530 THERAPEUTIC ACTIVITIES: CPT

## 2025-07-15 RX ORDER — OXYCODONE HYDROCHLORIDE 5 MG/1
5 TABLET ORAL EVERY 4 HOURS PRN
Refills: 0 | Status: DISCONTINUED | OUTPATIENT
Start: 2025-07-15 | End: 2025-07-18 | Stop reason: HOSPADM

## 2025-07-15 RX ADMIN — ENOXAPARIN SODIUM 30 MG: 100 INJECTION SUBCUTANEOUS at 21:28

## 2025-07-15 RX ADMIN — ENOXAPARIN SODIUM 30 MG: 100 INJECTION SUBCUTANEOUS at 09:45

## 2025-07-15 RX ADMIN — SODIUM CHLORIDE, PRESERVATIVE FREE 10 ML: 5 INJECTION INTRAVENOUS at 09:45

## 2025-07-15 RX ADMIN — Medication 2 PUFF: at 08:38

## 2025-07-15 RX ADMIN — MORPHINE SULFATE 1 MG: 2 INJECTION, SOLUTION INTRAMUSCULAR; INTRAVENOUS at 02:26

## 2025-07-15 RX ADMIN — PIPERACILLIN AND TAZOBACTAM 3375 MG: 3; .375 INJECTION, POWDER, LYOPHILIZED, FOR SOLUTION INTRAVENOUS at 06:38

## 2025-07-15 RX ADMIN — Medication 2 PUFF: at 20:39

## 2025-07-15 RX ADMIN — POTASSIUM CHLORIDE 40 MEQ: 1500 TABLET, EXTENDED RELEASE ORAL at 16:07

## 2025-07-15 RX ADMIN — PRAVASTATIN SODIUM 40 MG: 40 TABLET ORAL at 21:27

## 2025-07-15 RX ADMIN — ASPIRIN 81 MG: 81 TABLET, CHEWABLE ORAL at 09:45

## 2025-07-15 RX ADMIN — ALUMINUM HYDROXIDE, MAGNESIUM HYDROXIDE, AND SIMETHICONE 30 ML: 200; 200; 20 SUSPENSION ORAL at 00:03

## 2025-07-15 RX ADMIN — FLUOXETINE HYDROCHLORIDE 40 MG: 20 CAPSULE ORAL at 21:27

## 2025-07-15 RX ADMIN — Medication 5 MCG: at 08:38

## 2025-07-15 RX ADMIN — TAMSULOSIN HYDROCHLORIDE 0.4 MG: 0.4 CAPSULE ORAL at 09:45

## 2025-07-15 RX ADMIN — PIPERACILLIN AND TAZOBACTAM 3375 MG: 3; .375 INJECTION, POWDER, LYOPHILIZED, FOR SOLUTION INTRAVENOUS at 16:05

## 2025-07-15 RX ADMIN — TRAZODONE HYDROCHLORIDE 100 MG: 50 TABLET ORAL at 21:27

## 2025-07-15 RX ADMIN — PANTOPRAZOLE SODIUM 20 MG: 20 TABLET, DELAYED RELEASE ORAL at 06:35

## 2025-07-15 ASSESSMENT — PAIN SCALES - GENERAL
PAINLEVEL_OUTOF10: 0
PAINLEVEL_OUTOF10: 7

## 2025-07-15 ASSESSMENT — PAIN SCALES - WONG BAKER: WONGBAKER_NUMERICALRESPONSE: NO HURT

## 2025-07-15 ASSESSMENT — PAIN DESCRIPTION - LOCATION: LOCATION: HIP

## 2025-07-15 ASSESSMENT — PAIN DESCRIPTION - ORIENTATION: ORIENTATION: LEFT

## 2025-07-15 NOTE — CARE COORDINATION
Cm reviewed chart. Being followed by ID, currently on IV antibiotics. Being followed by nephrology, monitoring labs.    Currently patient declining SNF, discharge plan is home. Requested PT to re-evaluate for safe discharge planning.

## 2025-07-16 ENCOUNTER — APPOINTMENT (OUTPATIENT)
Facility: HOSPITAL | Age: 75
DRG: 871 | End: 2025-07-16
Attending: INTERNAL MEDICINE
Payer: MEDICARE

## 2025-07-16 LAB
ANION GAP SERPL CALC-SCNC: 5 MMOL/L (ref 2–12)
BASOPHILS # BLD: 0 K/UL (ref 0–0.1)
BASOPHILS NFR BLD: 0 % (ref 0–1)
BUN SERPL-MCNC: 20 MG/DL (ref 6–20)
BUN/CREAT SERPL: 10 (ref 12–20)
CA-I BLD-MCNC: 8.8 MG/DL (ref 8.5–10.1)
CHLORIDE SERPL-SCNC: 113 MMOL/L (ref 97–108)
CO2 SERPL-SCNC: 25 MMOL/L (ref 21–32)
CREAT SERPL-MCNC: 1.94 MG/DL (ref 0.7–1.3)
CRP SERPL-MCNC: 8.29 MG/DL (ref 0–0.3)
DIFFERENTIAL METHOD BLD: ABNORMAL
EKG ATRIAL RATE: 51 BPM
EKG DIAGNOSIS: NORMAL
EKG P AXIS: 62 DEGREES
EKG P-R INTERVAL: 312 MS
EKG Q-T INTERVAL: 490 MS
EKG QRS DURATION: 96 MS
EKG QTC CALCULATION (BAZETT): 451 MS
EKG R AXIS: 62 DEGREES
EKG T AXIS: 43 DEGREES
EKG VENTRICULAR RATE: 51 BPM
EOSINOPHIL # BLD: 0.3 K/UL (ref 0–0.4)
EOSINOPHIL NFR BLD: 4 % (ref 0–7)
ERYTHROCYTE [DISTWIDTH] IN BLOOD BY AUTOMATED COUNT: 13 % (ref 11.5–14.5)
GLUCOSE SERPL-MCNC: 83 MG/DL (ref 65–100)
HCT VFR BLD AUTO: 29.1 % (ref 36.6–50.3)
HGB BLD-MCNC: 9.9 G/DL (ref 12.1–17)
IMM GRANULOCYTES # BLD AUTO: 0 K/UL
IMM GRANULOCYTES NFR BLD AUTO: 0 %
LYMPHOCYTES # BLD: 1.06 K/UL (ref 0.8–3.5)
LYMPHOCYTES NFR BLD: 14 % (ref 12–49)
MCH RBC QN AUTO: 33 PG (ref 26–34)
MCHC RBC AUTO-ENTMCNC: 34 G/DL (ref 30–36.5)
MCV RBC AUTO: 97 FL (ref 80–99)
MONOCYTES # BLD: 0.3 K/UL (ref 0–1)
MONOCYTES NFR BLD: 4 % (ref 5–13)
NEUTS BAND NFR BLD MANUAL: 8 % (ref 0–6)
NEUTS SEG # BLD: 5.94 K/UL (ref 1.8–8)
NEUTS SEG NFR BLD: 70 % (ref 32–75)
NRBC # BLD: 0 K/UL (ref 0–0.01)
NRBC BLD-RTO: 0 PER 100 WBC
PLATELET # BLD AUTO: 110 K/UL (ref 150–400)
PMV BLD AUTO: 11.7 FL (ref 8.9–12.9)
POTASSIUM SERPL-SCNC: 3.8 MMOL/L (ref 3.5–5.1)
PROCALCITONIN SERPL-MCNC: 23.56 NG/ML
RBC # BLD AUTO: 3 M/UL (ref 4.1–5.7)
RBC MORPH BLD: ABNORMAL
SODIUM SERPL-SCNC: 143 MMOL/L (ref 136–145)
WBC # BLD AUTO: 7.6 K/UL (ref 4.1–11.1)

## 2025-07-16 PROCEDURE — 84145 PROCALCITONIN (PCT): CPT

## 2025-07-16 PROCEDURE — 94761 N-INVAS EAR/PLS OXIMETRY MLT: CPT

## 2025-07-16 PROCEDURE — 6370000000 HC RX 637 (ALT 250 FOR IP): Performed by: INTERNAL MEDICINE

## 2025-07-16 PROCEDURE — 2580000003 HC RX 258

## 2025-07-16 PROCEDURE — 6360000002 HC RX W HCPCS

## 2025-07-16 PROCEDURE — 80048 BASIC METABOLIC PNL TOTAL CA: CPT

## 2025-07-16 PROCEDURE — 6370000000 HC RX 637 (ALT 250 FOR IP)

## 2025-07-16 PROCEDURE — 86140 C-REACTIVE PROTEIN: CPT

## 2025-07-16 PROCEDURE — 85025 COMPLETE CBC W/AUTO DIFF WBC: CPT

## 2025-07-16 PROCEDURE — 94640 AIRWAY INHALATION TREATMENT: CPT

## 2025-07-16 PROCEDURE — 99232 SBSQ HOSP IP/OBS MODERATE 35: CPT | Performed by: INTERNAL MEDICINE

## 2025-07-16 PROCEDURE — 97530 THERAPEUTIC ACTIVITIES: CPT

## 2025-07-16 PROCEDURE — 1100000000 HC RM PRIVATE

## 2025-07-16 PROCEDURE — 36415 COLL VENOUS BLD VENIPUNCTURE: CPT

## 2025-07-16 PROCEDURE — 93306 TTE W/DOPPLER COMPLETE: CPT

## 2025-07-16 RX ORDER — HYDRALAZINE HYDROCHLORIDE 25 MG/1
25 TABLET, FILM COATED ORAL EVERY 8 HOURS SCHEDULED
Status: DISCONTINUED | OUTPATIENT
Start: 2025-07-16 | End: 2025-07-18 | Stop reason: HOSPADM

## 2025-07-16 RX ADMIN — Medication 2 PUFF: at 08:17

## 2025-07-16 RX ADMIN — TRAZODONE HYDROCHLORIDE 100 MG: 50 TABLET ORAL at 20:12

## 2025-07-16 RX ADMIN — PANTOPRAZOLE SODIUM 20 MG: 20 TABLET, DELAYED RELEASE ORAL at 06:41

## 2025-07-16 RX ADMIN — HYDRALAZINE HYDROCHLORIDE 25 MG: 25 TABLET ORAL at 20:12

## 2025-07-16 RX ADMIN — FLUOXETINE HYDROCHLORIDE 40 MG: 20 CAPSULE ORAL at 20:12

## 2025-07-16 RX ADMIN — Medication 2 PUFF: at 20:19

## 2025-07-16 RX ADMIN — ASPIRIN 81 MG: 81 TABLET, CHEWABLE ORAL at 08:27

## 2025-07-16 RX ADMIN — PIPERACILLIN AND TAZOBACTAM 3375 MG: 3; .375 INJECTION, POWDER, LYOPHILIZED, FOR SOLUTION INTRAVENOUS at 14:49

## 2025-07-16 RX ADMIN — ENOXAPARIN SODIUM 30 MG: 100 INJECTION SUBCUTANEOUS at 08:27

## 2025-07-16 RX ADMIN — PRAVASTATIN SODIUM 40 MG: 40 TABLET ORAL at 20:12

## 2025-07-16 RX ADMIN — TAMSULOSIN HYDROCHLORIDE 0.4 MG: 0.4 CAPSULE ORAL at 08:27

## 2025-07-16 RX ADMIN — ENOXAPARIN SODIUM 30 MG: 100 INJECTION SUBCUTANEOUS at 20:13

## 2025-07-16 RX ADMIN — PIPERACILLIN AND TAZOBACTAM 3375 MG: 3; .375 INJECTION, POWDER, LYOPHILIZED, FOR SOLUTION INTRAVENOUS at 06:42

## 2025-07-16 RX ADMIN — PIPERACILLIN AND TAZOBACTAM 3375 MG: 3; .375 INJECTION, POWDER, LYOPHILIZED, FOR SOLUTION INTRAVENOUS at 00:08

## 2025-07-16 RX ADMIN — Medication 5 MCG: at 08:17

## 2025-07-16 ASSESSMENT — PAIN SCALES - GENERAL
PAINLEVEL_OUTOF10: 0

## 2025-07-16 NOTE — CARE COORDINATION
CM reviewed chart.  Per IDRs, patient likely to need another 48 hours in the hospital.  Patient on IV ABX, and receiving fluids.    Patient and family declined SNF yesterday.  They would prefer that patient come home.  PT to work with patient again to help family determine final plan.    CM will follow up after PT works with patient today.

## 2025-07-16 NOTE — CONSULTS
Consult    NAME: Misael Oneil   :  1950   MRN:  [id number]     Date/Time:  2025 11:22 AM    Patient PCP: Ruth Kenny MD  ________________________________________________________________________      Subjective:   CHIEF COMPLAINT: Chart reviewed.  Patient examined.  Mr. Oneil is a very pleasant 75-year-old gentleman who was brought with altered mental status and according to the note he was covered in feces and urine.  He apparently was okay previous day.    HISTORY OF PRESENT ILLNESS: After he was treated for possible sepsis he feels much better and he told me that he had fell off the bed when he was trying to reach out something and he did not lose consciousness.  He did mention that he had a similar episode many months ago once.  Denies any chest tightness palpitation.  He is found to have bradycardia and cardiology consultation is invited.           Past Medical History:   Diagnosis Date    AAA (abdominal aortic aneurysm)     Adrenal adenoma, left     Aneurysm of infrarenal abdominal aorta     4.2 cm    Aneurysm of left common iliac artery     2.7 cm    Aneurysm of left external iliac artery     2.5 cm    Anxiety and depression     Aortic stenosis     Bradycardia     CAD (coronary artery disease)     Carotid artery stenosis, unilateral, right     Cirrhosis (HCC)     COPD (chronic obstructive pulmonary disease) (HCC)     Cyst of epididymis     Diabetes mellitus, type II (HCC)     GERD (gastroesophageal reflux disease)     Heart block AV first degree     History of placement of stent in LAD coronary artery     HLD (hyperlipidemia)     Hypertension     Internal carotid artery stent present     Lung nodule     MI (myocardial infarction) (HCC)     pt states several MI's 1998 and last MI     SILVINA (obstructive sleep apnea)     pt states not using CPAP    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     
Infectious Disease Consult Note    Reason for Consult:  Sepsis   Date of Consultation: July 13, 2025  Date of Admission: 7/13/2025  Referring Physician: Hospitalist     HPI: 75 y.o WM admitted with diagnosis of sepsis w/o a source for which ID has been consulted. He presented to the ED w AMS after a fall, was found to be covered w fecal matter, abnormal EKG was noted by EMS. He is a limited historian, history obtained from chart documentations. His med history is a listed below including AAA, CAD, liver cirrhosis, DM type II, GERD, first-degree heart block, HTN, PVD and nephrolithiasis.  He has been afebrile and hemodynamically stable since presentation.  Today's blood work shows a WBC of 18.4, lactic acid 3.7, POC lactic acid was 11.65 trended down to 4.20, Cr 2.25, CRP 8.96, and Procal 24.12. UA showed pyuria, bacteriuria and large leukocyte esterase. Blood and urine Cx from earlier today are pending. He tested neg for COVID and influenza in the ED. Admission CXR is neg for focal infiltrates. CT head revealed Chronic right parietal infarct and chronic microvascular ischemic white matter change, no acute process. He is on Zosyn monotherapy, s/p Vanc. No acute events since admission.     Review of Systems:     Gen: Negative for chills, fevers, weight loss, weight gain   CV:  Negative for chest pain, dyspnea on exertion, leg edema   Lungs: Negative for shortness of breath, cough, wheezing   Abdomen: Negative for abdominal pain, nausea, vomiting, diarrhea, constipation   Genitourinary: Negative for genital pain or genital discharge     Neuro: confused    Skin: Negative for rash, sores/open wounds   Musculoskeletal: Negative for joint pain, joint swelling, joint erythema    Psych: Negative for manic behavior       Past Medical History:  Past Medical History:   Diagnosis Date    AAA (abdominal aortic aneurysm)     Adrenal adenoma, left     Aneurysm of infrarenal abdominal aorta     4.2 cm    Aneurysm of left common 
tablet 40 mEq  40 mEq Oral PRN Rosario Boyer MD        Or    potassium bicarb-citric acid (EFFER-K) effervescent tablet 40 mEq  40 mEq Oral PRN Rosario Boyer MD        Or    potassium chloride 10 mEq/100 mL IVPB (Peripheral Line)  10 mEq IntraVENous PRN Rosario Boyer MD        magnesium sulfate 2000 mg in 50 mL IVPB premix  2,000 mg IntraVENous PRN Rosario Boyer MD        enoxaparin Sodium (LOVENOX) injection 30 mg  30 mg SubCUTAneous BID Rosario Boyer MD   30 mg at 07/13/25 0926    ondansetron (ZOFRAN-ODT) disintegrating tablet 4 mg  4 mg Oral Q8H PRN Rosario Boyer MD        Or    ondansetron (ZOFRAN) injection 4 mg  4 mg IntraVENous Q6H PRN Rosario Boyer MD        polyethylene glycol (GLYCOLAX) packet 17 g  17 g Oral Daily PRN Rosario Boyer MD        acetaminophen (TYLENOL) tablet 650 mg  650 mg Oral Q6H PRN Rosario Boyer MD        Or    acetaminophen (TYLENOL) suppository 650 mg  650 mg Rectal Q6H PRN Rosario Boyer MD        piperacillin-tazobactam (ZOSYN) 3,375 mg in sodium chloride 0.9 % 50 mL IVPB (addEASE)  3,375 mg IntraVENous q8h Rosario Boyer MD 12.5 mL/hr at 07/13/25 1502 3,375 mg at 07/13/25 1502    budesonide-formoterol (SYMBICORT) 80-4.5 MCG/ACT inhaler 2 puff  2 puff Inhalation BID RT Rosario Boyer MD   2 puff at 07/13/25 0818    And    tiotropium (SPIRIVA RESPIMAT) 2.5 MCG/ACT inhaler 5 mcg  2 puff Inhalation Daily RT Rosario Boyer MD   5 mcg at 07/13/25 0818    tamsulosin (FLOMAX) capsule 0.4 mg  0.4 mg Oral Daily Rosario Boyer MD   0.4 mg at 07/13/25 0926    albuterol sulfate HFA (PROVENTIL;VENTOLIN;PROAIR) 108 (90 Base) MCG/ACT inhaler 2 puff  2 puff Inhalation Q6H PRN Rosario Boyer MD            Allergies   Allergen Reactions    Hydrochlorothiazide Other (See Comments)     kidney problem    Lisinopril Cough       Review of Systems:  A comprehensive review of systems was negative except for that written in the History of Present Illness.    Objective:

## 2025-07-17 ENCOUNTER — APPOINTMENT (OUTPATIENT)
Facility: HOSPITAL | Age: 75
DRG: 871 | End: 2025-07-17
Attending: INTERNAL MEDICINE
Payer: MEDICARE

## 2025-07-17 LAB
ECHO AO ROOT DIAM: 3.6 CM
ECHO AO ROOT INDEX: 1.6 CM/M2
ECHO AR MAX VEL PISA: 3.3 M/S
ECHO AV AREA PEAK VELOCITY: 1.8 CM2
ECHO AV AREA VTI: 1.8 CM2
ECHO AV AREA/BSA PEAK VELOCITY: 0.8 CM2/M2
ECHO AV AREA/BSA VTI: 0.8 CM2/M2
ECHO AV MEAN GRADIENT: 7 MMHG
ECHO AV MEAN VELOCITY: 1.3 M/S
ECHO AV PEAK GRADIENT: 14 MMHG
ECHO AV PEAK VELOCITY: 1.9 M/S
ECHO AV REGURGITANT PHT: 601 MS
ECHO AV VELOCITY RATIO: 0.58
ECHO AV VTI: 47.5 CM
ECHO BSA: 2.33 M2
ECHO EST RA PRESSURE: 15 MMHG
ECHO IVC EXP: 2.3 CM
ECHO LA AREA 4C: 19.3 CM2
ECHO LA DIAMETER INDEX: 1.91 CM/M2
ECHO LA DIAMETER: 4.3 CM
ECHO LA MAJOR AXIS: 5.8 CM
ECHO LA TO AORTIC ROOT RATIO: 1.19
ECHO LA VOL MOD A4C: 51 ML (ref 18–58)
ECHO LA VOLUME INDEX MOD A4C: 23 ML/M2 (ref 16–34)
ECHO LV E' LATERAL VELOCITY: 7.29 CM/S
ECHO LV E' SEPTAL VELOCITY: 8.38 CM/S
ECHO LV EDV A2C: 103 ML
ECHO LV EDV A4C: 121 ML
ECHO LV EDV INDEX A4C: 54 ML/M2
ECHO LV EDV NDEX A2C: 46 ML/M2
ECHO LV EF PHYSICIAN: 55 %
ECHO LV EJECTION FRACTION A2C: 53 %
ECHO LV EJECTION FRACTION A4C: 61 %
ECHO LV EJECTION FRACTION BIPLANE: 58 % (ref 55–100)
ECHO LV ESV A2C: 48 ML
ECHO LV ESV A4C: 47 ML
ECHO LV ESV INDEX A2C: 21 ML/M2
ECHO LV ESV INDEX A4C: 21 ML/M2
ECHO LV FRACTIONAL SHORTENING: 14 % (ref 28–44)
ECHO LV INTERNAL DIMENSION DIASTOLE INDEX: 2.22 CM/M2
ECHO LV INTERNAL DIMENSION DIASTOLIC: 5 CM (ref 4.2–5.9)
ECHO LV INTERNAL DIMENSION SYSTOLIC INDEX: 1.91 CM/M2
ECHO LV INTERNAL DIMENSION SYSTOLIC: 4.3 CM
ECHO LV IVSD: 1.2 CM (ref 0.6–1)
ECHO LV MASS 2D: 233.7 G (ref 88–224)
ECHO LV MASS INDEX 2D: 103.9 G/M2 (ref 49–115)
ECHO LV POSTERIOR WALL DIASTOLIC: 1.2 CM (ref 0.6–1)
ECHO LV RELATIVE WALL THICKNESS RATIO: 0.48
ECHO LVOT AREA: 3.1 CM2
ECHO LVOT AV VTI INDEX: 0.57
ECHO LVOT DIAM: 2 CM
ECHO LVOT MEAN GRADIENT: 3 MMHG
ECHO LVOT PEAK GRADIENT: 5 MMHG
ECHO LVOT PEAK VELOCITY: 1.1 M/S
ECHO LVOT STROKE VOLUME INDEX: 37.7 ML/M2
ECHO LVOT SV: 84.8 ML
ECHO LVOT VTI: 27 CM
ECHO MV A VELOCITY: 0.89 M/S
ECHO MV AREA VTI: 1.9 CM2
ECHO MV E DECELERATION TIME (DT): 284 MS
ECHO MV E VELOCITY: 0.98 M/S
ECHO MV E/A RATIO: 1.1
ECHO MV E/E' LATERAL: 13.44
ECHO MV E/E' RATIO (AVERAGED): 12.57
ECHO MV E/E' SEPTAL: 11.69
ECHO MV LVOT VTI INDEX: 1.68
ECHO MV MAX VELOCITY: 1.2 M/S
ECHO MV MEAN GRADIENT: 3 MMHG
ECHO MV MEAN VELOCITY: 0.8 M/S
ECHO MV PEAK GRADIENT: 6 MMHG
ECHO MV REGURGITANT PEAK GRADIENT: 25 MMHG
ECHO MV REGURGITANT PEAK VELOCITY: 2.5 M/S
ECHO MV VTI: 45.4 CM
ECHO RA AREA 4C: 10.6 CM2
ECHO RA END SYSTOLIC VOLUME APICAL 4 CHAMBER INDEX BSA: 8 ML/M2
ECHO RA VOLUME: 18 ML
ECHO RIGHT VENTRICULAR SYSTOLIC PRESSURE (RVSP): 21 MMHG
ECHO RV BASAL DIMENSION: 3.4 CM
ECHO RV FREE WALL PEAK S': 12.9 CM/S
ECHO RV TAPSE: 2.3 CM (ref 1.7–?)
ECHO TV REGURGITANT MAX VELOCITY: 1.25 M/S
ECHO TV REGURGITANT PEAK GRADIENT: 6 MMHG
EKG ATRIAL RATE: 58 BPM
EKG DIAGNOSIS: NORMAL
EKG P AXIS: 37 DEGREES
EKG P-R INTERVAL: 322 MS
EKG Q-T INTERVAL: 462 MS
EKG QRS DURATION: 100 MS
EKG QTC CALCULATION (BAZETT): 453 MS
EKG R AXIS: 48 DEGREES
EKG T AXIS: 32 DEGREES
EKG VENTRICULAR RATE: 58 BPM

## 2025-07-17 PROCEDURE — 94761 N-INVAS EAR/PLS OXIMETRY MLT: CPT

## 2025-07-17 PROCEDURE — 94640 AIRWAY INHALATION TREATMENT: CPT

## 2025-07-17 PROCEDURE — 6370000000 HC RX 637 (ALT 250 FOR IP): Performed by: INTERNAL MEDICINE

## 2025-07-17 PROCEDURE — 76706 US ABDL AORTA SCREEN AAA: CPT

## 2025-07-17 PROCEDURE — 6370000000 HC RX 637 (ALT 250 FOR IP)

## 2025-07-17 PROCEDURE — 93005 ELECTROCARDIOGRAM TRACING: CPT | Performed by: INTERNAL MEDICINE

## 2025-07-17 PROCEDURE — 99232 SBSQ HOSP IP/OBS MODERATE 35: CPT | Performed by: INTERNAL MEDICINE

## 2025-07-17 PROCEDURE — 6360000002 HC RX W HCPCS

## 2025-07-17 PROCEDURE — 76706 US ABDL AORTA SCREEN AAA: CPT | Performed by: SURGERY

## 2025-07-17 PROCEDURE — 2580000003 HC RX 258

## 2025-07-17 PROCEDURE — 1100000000 HC RM PRIVATE

## 2025-07-17 PROCEDURE — 2500000003 HC RX 250 WO HCPCS

## 2025-07-17 RX ORDER — TAMSULOSIN HYDROCHLORIDE 0.4 MG/1
0.4 CAPSULE ORAL DAILY
Qty: 30 CAPSULE | Refills: 3 | Status: SHIPPED | OUTPATIENT
Start: 2025-07-18 | End: 2025-07-19

## 2025-07-17 RX ADMIN — PANTOPRAZOLE SODIUM 20 MG: 20 TABLET, DELAYED RELEASE ORAL at 06:46

## 2025-07-17 RX ADMIN — Medication 2 PUFF: at 07:59

## 2025-07-17 RX ADMIN — Medication 2 PUFF: at 19:55

## 2025-07-17 RX ADMIN — ENOXAPARIN SODIUM 30 MG: 100 INJECTION SUBCUTANEOUS at 08:33

## 2025-07-17 RX ADMIN — PIPERACILLIN AND TAZOBACTAM 3375 MG: 3; .375 INJECTION, POWDER, LYOPHILIZED, FOR SOLUTION INTRAVENOUS at 00:46

## 2025-07-17 RX ADMIN — PRAVASTATIN SODIUM 40 MG: 40 TABLET ORAL at 20:47

## 2025-07-17 RX ADMIN — HYDRALAZINE HYDROCHLORIDE 25 MG: 25 TABLET ORAL at 20:47

## 2025-07-17 RX ADMIN — ASPIRIN 81 MG: 81 TABLET, CHEWABLE ORAL at 08:33

## 2025-07-17 RX ADMIN — FLUOXETINE HYDROCHLORIDE 40 MG: 20 CAPSULE ORAL at 20:47

## 2025-07-17 RX ADMIN — TRAZODONE HYDROCHLORIDE 100 MG: 50 TABLET ORAL at 20:46

## 2025-07-17 RX ADMIN — HYDRALAZINE HYDROCHLORIDE 25 MG: 25 TABLET ORAL at 06:46

## 2025-07-17 RX ADMIN — TAMSULOSIN HYDROCHLORIDE 0.4 MG: 0.4 CAPSULE ORAL at 08:33

## 2025-07-17 RX ADMIN — Medication 5 MCG: at 07:59

## 2025-07-17 RX ADMIN — PIPERACILLIN AND TAZOBACTAM 3375 MG: 3; .375 INJECTION, POWDER, LYOPHILIZED, FOR SOLUTION INTRAVENOUS at 14:42

## 2025-07-17 RX ADMIN — PIPERACILLIN AND TAZOBACTAM 3375 MG: 3; .375 INJECTION, POWDER, LYOPHILIZED, FOR SOLUTION INTRAVENOUS at 06:44

## 2025-07-17 RX ADMIN — HYDRALAZINE HYDROCHLORIDE 25 MG: 25 TABLET ORAL at 14:45

## 2025-07-17 RX ADMIN — SODIUM CHLORIDE, PRESERVATIVE FREE 10 ML: 5 INJECTION INTRAVENOUS at 08:36

## 2025-07-17 RX ADMIN — ENOXAPARIN SODIUM 30 MG: 100 INJECTION SUBCUTANEOUS at 20:47

## 2025-07-17 ASSESSMENT — PAIN SCALES - GENERAL: PAINLEVEL_OUTOF10: 0

## 2025-07-17 NOTE — DISCHARGE SUMMARY
Physician Discharge Summary     Patient ID:    Misael Oneil  [id number]  75 y.o.  1950    Admit date: 7/13/2025    Discharge date : 7/17/2025      Final Diagnoses:   Sepsis (HCC) [A41.9]  Altered mental status, unspecified altered mental status type [R41.82]  Sepsis, due to unspecified organism, unspecified whether acute organ dysfunction present (HCC) [A41.9]  alternator status 2/2 severe sepsis 2/2 UTI  hypertension 2/2 severe sepsis 2/2 UTI  BPH  history of CVA  sinus bradycardia  left hip bruising 2/2 fall, rule out fracture  AK I on CKD  history of CAD  SILVINA, not compliant with CPAP  diabetes mellitus  Mobitz type one 2nd° AV block    Reason for Hospitalization:    Misael Oneil is a 75 y.o.  male with PMHx significant for CAD, diabetes mellitus, GERD, hypertension, SILVINA noncompliant with CPAP, chronic back pain, COPD, hyperlipidemia, history of CVA anxiety/depression, BPH was brought into the ED for evaluation of altered mental status and was covered in fecal matter and urine.  Reportedly patient was last well-known yesterday.     During my evaluation by bedside, patient awake and alert x 3, slow to respond, falling asleep very fast.  States he is doing better, however is very sleepy.  Was able to tell me place month and name not year.  States yesterday he fell, then was unable to get up also having shortness of breath.  Denies any fever, chills, nausea, vomiting.  Denies any dysuria, thinks might have trouble urinating, unable to tell me clearly.  Per reports, patient did not have watery diarrhea, had soft bowel movement that was smeared all over him.     Of note, recently in May had transurethral resection of bladder tumor, ureteral dilatation.     In the ED, afebrile tachypneic tachycardic, soft blood pressure.  CBC unremarkable, CMP shows hypokalemia of 3.2, creatinine of 2.25, baseline is 1.27, bicarb is 17, anion gap 18.  POC shows lactic acid of 11.63.  pH is 7.73, LFT shows

## 2025-07-17 NOTE — CARE COORDINATION
CM reviewed chart.  Per IDRs, patient likely to need another 48 hours in the hospital.  Patient still receiving IV ABX and pending final cultures.    PT saw patient yesterday and still recommend SNF.  CM met with patient at bedside.  Patient is still declining SNF placement and would prefer to go home.    CM offered HH and patient declined this as well.    At this time patient will discharge home/self.

## 2025-07-17 NOTE — CARE COORDINATION
CM noted discharge order pending patient's ultrasound and results.  Patient's plan is to discharge home/self if medically clear today.    Transition of Care Plan:    RUR: 14%  Prior Level of Functioning: Needs assistance  Disposition: Home/self (Declined SNF &HH)  JADEN: 7/17/25  If SNF or IPR: Date FOC offered: N/A  Date FOC received: N/A  Accepting facility: N/A  Date authorization started with reference number: N/A  Date authorization received and expires: N/A  Follow up appointments: Per MD  DME needed: N/A  Transportation at discharge: Patient arranged  IM/IMM Medicare/ letter given: Yes  Is patient a [facility] and connected with [facility]? No   If yes, was [facility] transfer form completed and [facility] notified?   Caregiver Contact: N/A  Discharge Caregiver contacted prior to discharge? N/A  Care Conference needed? No  Barriers to discharge: None

## 2025-07-18 VITALS
BODY MASS INDEX: 37.89 KG/M2 | TEMPERATURE: 97.9 F | OXYGEN SATURATION: 96 % | SYSTOLIC BLOOD PRESSURE: 143 MMHG | RESPIRATION RATE: 18 BRPM | HEART RATE: 54 BPM | WEIGHT: 250 LBS | HEIGHT: 68 IN | DIASTOLIC BLOOD PRESSURE: 66 MMHG

## 2025-07-18 LAB
BASOPHILS # BLD: 0 K/UL (ref 0–0.1)
BASOPHILS NFR BLD: 0 % (ref 0–1)
CRP SERPL-MCNC: 3.52 MG/DL (ref 0–0.3)
DIFFERENTIAL METHOD BLD: ABNORMAL
ECHO BSA: 2.33 M2
EOSINOPHIL # BLD: 0.24 K/UL (ref 0–0.4)
EOSINOPHIL NFR BLD: 3 % (ref 0–7)
ERYTHROCYTE [DISTWIDTH] IN BLOOD BY AUTOMATED COUNT: 13 % (ref 11.5–14.5)
HCT VFR BLD AUTO: 31.9 % (ref 36.6–50.3)
HGB BLD-MCNC: 10.8 G/DL (ref 12.1–17)
IMM GRANULOCYTES # BLD AUTO: 0 K/UL
IMM GRANULOCYTES NFR BLD AUTO: 0 %
LYMPHOCYTES # BLD: 2.72 K/UL (ref 0.8–3.5)
LYMPHOCYTES NFR BLD: 34 % (ref 12–49)
MCH RBC QN AUTO: 33.2 PG (ref 26–34)
MCHC RBC AUTO-ENTMCNC: 33.9 G/DL (ref 30–36.5)
MCV RBC AUTO: 98.2 FL (ref 80–99)
MONOCYTES # BLD: 0.32 K/UL (ref 0–1)
MONOCYTES NFR BLD: 4 % (ref 5–13)
MYELOCYTES NFR BLD MANUAL: 1 %
NEUTS SEG # BLD: 4.64 K/UL (ref 1.8–8)
NEUTS SEG NFR BLD: 58 % (ref 32–75)
NRBC # BLD: 0 K/UL (ref 0–0.01)
NRBC BLD-RTO: 0 PER 100 WBC
PLATELET # BLD AUTO: 155 K/UL (ref 150–400)
PMV BLD AUTO: 11.5 FL (ref 8.9–12.9)
PROCALCITONIN SERPL-MCNC: 5.53 NG/ML
RBC # BLD AUTO: 3.25 M/UL (ref 4.1–5.7)
RBC MORPH BLD: ABNORMAL
T4 FREE SERPL-MCNC: 0.5 NG/DL (ref 0.8–1.5)
TSH SERPL DL<=0.05 MIU/L-ACNC: 6.1 UIU/ML (ref 0.36–3.74)
VAS AORTA DIST AP: 4 CM
VAS AORTA DIST PSV: 52.2 CM/S
VAS AORTA DIST TR: 4.55 CM
VAS AORTA MID AP: 3.04 CM
VAS AORTA MID PSV: 38.1 CM/S
VAS AORTA MID TRANS: 3.04 CM
VAS AORTA PROX AP: 3.14 CM
VAS AORTA PROX PSV: 99.7 CM/S
VAS AORTA PROX TR: 3.3 CM
VAS LEFT COM ILIAC AP: 1.92 CM
VAS LEFT COM ILIAC PROX PSV: 54.7 CM/S
VAS LEFT COM ILIAC TRANS: 2.21 CM
VAS RIGHT COM ILIAC AP: 1.24 CM
VAS RIGHT COM ILIAC PROX PSV: 311 CM/S
VAS RIGHT COM ILIAC TRANS: 1.08 CM
WBC # BLD AUTO: 8 K/UL (ref 4.1–11.1)

## 2025-07-18 PROCEDURE — 84439 ASSAY OF FREE THYROXINE: CPT

## 2025-07-18 PROCEDURE — 94640 AIRWAY INHALATION TREATMENT: CPT

## 2025-07-18 PROCEDURE — 6370000000 HC RX 637 (ALT 250 FOR IP): Performed by: INTERNAL MEDICINE

## 2025-07-18 PROCEDURE — 36415 COLL VENOUS BLD VENIPUNCTURE: CPT

## 2025-07-18 PROCEDURE — 85025 COMPLETE CBC W/AUTO DIFF WBC: CPT

## 2025-07-18 PROCEDURE — 6370000000 HC RX 637 (ALT 250 FOR IP)

## 2025-07-18 PROCEDURE — 84443 ASSAY THYROID STIM HORMONE: CPT

## 2025-07-18 PROCEDURE — 6360000002 HC RX W HCPCS

## 2025-07-18 PROCEDURE — 2500000003 HC RX 250 WO HCPCS

## 2025-07-18 PROCEDURE — 86140 C-REACTIVE PROTEIN: CPT

## 2025-07-18 PROCEDURE — 99232 SBSQ HOSP IP/OBS MODERATE 35: CPT | Performed by: INTERNAL MEDICINE

## 2025-07-18 PROCEDURE — 2580000003 HC RX 258

## 2025-07-18 PROCEDURE — 84145 PROCALCITONIN (PCT): CPT

## 2025-07-18 RX ORDER — LEVOTHYROXINE SODIUM 50 UG/1
50 TABLET ORAL DAILY
Qty: 30 TABLET | Refills: 3 | Status: SHIPPED | OUTPATIENT
Start: 2025-07-18 | End: 2025-07-19

## 2025-07-18 RX ADMIN — ASPIRIN 81 MG: 81 TABLET, CHEWABLE ORAL at 08:10

## 2025-07-18 RX ADMIN — SODIUM CHLORIDE, PRESERVATIVE FREE 10 ML: 5 INJECTION INTRAVENOUS at 08:14

## 2025-07-18 RX ADMIN — Medication 5 MCG: at 07:43

## 2025-07-18 RX ADMIN — HYDRALAZINE HYDROCHLORIDE 25 MG: 25 TABLET ORAL at 06:55

## 2025-07-18 RX ADMIN — ENOXAPARIN SODIUM 30 MG: 100 INJECTION SUBCUTANEOUS at 08:11

## 2025-07-18 RX ADMIN — Medication 2 PUFF: at 07:43

## 2025-07-18 RX ADMIN — PIPERACILLIN AND TAZOBACTAM 3375 MG: 3; .375 INJECTION, POWDER, LYOPHILIZED, FOR SOLUTION INTRAVENOUS at 06:56

## 2025-07-18 RX ADMIN — HYDRALAZINE HYDROCHLORIDE 25 MG: 25 TABLET ORAL at 13:49

## 2025-07-18 RX ADMIN — PANTOPRAZOLE SODIUM 20 MG: 20 TABLET, DELAYED RELEASE ORAL at 06:55

## 2025-07-18 RX ADMIN — SODIUM CHLORIDE: 0.9 INJECTION, SOLUTION INTRAVENOUS at 00:20

## 2025-07-18 RX ADMIN — PIPERACILLIN AND TAZOBACTAM 3375 MG: 3; .375 INJECTION, POWDER, LYOPHILIZED, FOR SOLUTION INTRAVENOUS at 00:22

## 2025-07-18 RX ADMIN — TAMSULOSIN HYDROCHLORIDE 0.4 MG: 0.4 CAPSULE ORAL at 08:09

## 2025-07-18 ASSESSMENT — PAIN SCALES - GENERAL
PAINLEVEL_OUTOF10: 0
PAINLEVEL_OUTOF10: 5
PAINLEVEL_OUTOF10: 0

## 2025-07-18 ASSESSMENT — PAIN DESCRIPTION - LOCATION: LOCATION: NECK

## 2025-07-18 NOTE — PROGRESS NOTES
Infectious Disease Progress Note           Subjective:   Continues to do well clinically, OOB to chair today. Afebrile, WBC normalized on todays labs. CT of left hip on 07/15 was neg for acute findings, soft tissue injury noted.    Objective:   Physical Exam:     BP (!) 162/72 Comment: BP taken again @141/77 @845am  Pulse 62   Temp 97.7 °F (36.5 °C) (Oral)   Resp 19   Ht 1.727 m (5' 8\")   Wt 113.4 kg (250 lb)   SpO2 98%   BMI 38.01 kg/m²  O2 Flow Rate (L/min): 2 L/min O2 Device: None (Room air)    Temp (24hrs), Av.7 °F (36.5 °C), Min:97.5 °F (36.4 °C), Max:97.9 °F (36.6 °C)    701 - 1900  In: -   Out: 400 [Urine:400]   1901 -  07  In: 3083.6 [P.O.:480; I.V.:2208.8]  Out: 2000 [Urine:2000]    General: NAD, AAO x 4  HEENT: MICHELLE, Moist mucosa   Lungs: Decreased at the bases, no wheeze/rhonchi   Heart: S1S2+, RRR, no murmur  Abdo: Soft, NT, ND, +BS   : no chavez cath   Exts: trace edema, + pulses b/l   Skin: Multiple bruises involving upper exts, no drainage or skin break down   Data Review:       Recent Days:    Recent Labs     25  0739 07/15/25  0857 25  0626   WBC 17.6* 11.5* 7.6   HGB 10.3* 10.2* 9.9*   HCT 30.8* 29.5* 29.1*   PLT 90* 97* 110*     Recent Labs     25  0739 07/15/25  0857 25  0626   BUN 23* 23* 20   CREATININE 2.41* 2.25* 1.94*       Lab Results   Component Value Date/Time    CRP 8.29 2025 06:26 AM        Microbiology     Results       Procedure Component Value Units Date/Time    Culture, Urine [[id number]] Collected: 25 0620    Order Status: Completed Specimen: Urine Updated: 25 1052     Special Requests --        No Special Requests  Reflexed from [id number]       Culture       No significant growth, <10,000 CFU/mL          Culture, Blood 1 [[id number]] Collected: 25 0108    Order Status: Completed Specimen: Blood Updated: 25 0711     Special Requests No Special Requests        Culture No 
                    Infectious Disease Progress Note           Subjective:   Continues to do well clinically, denies new complaints, left hip is better. Tolerating oral intake, denies antibiotics associated diarrhea or other side effects. Tells me he wants d/c home when ready   Objective:   Physical Exam:     BP (!) 152/76   Pulse 58   Temp 99 °F (37.2 °C) (Oral)   Resp 18   Ht 1.727 m (5' 8\")   Wt 113.4 kg (250 lb)   SpO2 95%   BMI 38.01 kg/m²  O2 Flow Rate (L/min): 2 L/min O2 Device: None (Room air)    Temp (24hrs), Av.2 °F (36.8 °C), Min:97.3 °F (36.3 °C), Max:99 °F (37.2 °C)    701 - 1900  In: -   Out: 750 [Urine:750]   07/15 1901 - 700  In: 2753.6 [I.V.:2208.8]  Out: 1600 [Urine:1600]    General: NAD, AAO x 4  HEENT: MICHELLE, Moist mucosa   Lungs: Decreased at the bases, no wheeze/rhonchi   Heart: S1S2+, RRR, no murmur  Abdo: Soft, NT, ND, +BS   : no chavez cath   Exts: trace edema, + pulses b/l   Skin: Multiple bruises involving upper exts, and back areas   Data Review:       Recent Days:    Recent Labs     07/15/25  0857 25  0626   WBC 11.5* 7.6   HGB 10.2* 9.9*   HCT 29.5* 29.1*   PLT 97* 110*     Recent Labs     07/15/25  0857 25  0626   BUN 23* 20   CREATININE 2.25* 1.94*       Lab Results   Component Value Date/Time    CRP 8.29 2025 06:26 AM        Microbiology     Results       Procedure Component Value Units Date/Time    Culture, Urine [[id number]] Collected: 25 0620    Order Status: Completed Specimen: Urine Updated: 25 1052     Special Requests --        No Special Requests  Reflexed from [id number]       Culture       No significant growth, <10,000 CFU/mL          Culture, Blood 1 [[id number]] Collected: 25    Order Status: Completed Specimen: Blood Updated: 25     Special Requests No Special Requests        Culture No growth 4 days       Culture, Blood 2 [[id number]] Collected: 25    Order Status: Completed 
                    Infectious Disease Progress Note           Subjective:   Doing well clinically, anticipated discharge home today, no acute events since last seen.  Objective:   Physical Exam:     BP (!) 143/66   Pulse 54   Temp 97.9 °F (36.6 °C) (Oral)   Resp 18   Ht 1.727 m (5' 8\")   Wt 113.4 kg (250 lb)   SpO2 96%   BMI 38.01 kg/m²  O2 Flow Rate (L/min): 2 L/min O2 Device: None (Room air)    Temp (24hrs), Av.1 °F (36.7 °C), Min:97.7 °F (36.5 °C), Max:98.4 °F (36.9 °C)    No intake/output data recorded.    1901 -  0700  In: 283.8 [I.V.:7.4]  Out: 3550 [Urine:3550]    General: NAD, AAO x 4  HEENT: MICHELLE, Moist mucosa   Lungs: Decreased at the bases, no wheeze/rhonchi   Heart: S1S2+, RRR, no murmur  Abdo: Soft, NT, ND, +BS   : no chavez cath   Exts: trace edema, + pulses b/l   Skin: Multiple bruises involving upper exts, and back areas   Data Review:       Recent Days:    Recent Labs     25  0626 25  0835   WBC 7.6 8.0   HGB 9.9* 10.8*   HCT 29.1* 31.9*   * 155     Recent Labs     25  0626   BUN 20   CREATININE 1.94*       Lab Results   Component Value Date/Time    CRP 3.52 2025 08:35 AM        Microbiology     Results       Procedure Component Value Units Date/Time    Culture, Urine [[id number]] Collected: 25 0620    Order Status: Completed Specimen: Urine Updated: 25 1052     Special Requests --        No Special Requests  Reflexed from [id number]       Culture       No significant growth, <10,000 CFU/mL          Culture, Blood 1 [[id number]] Collected: 25    Order Status: Completed Specimen: Blood Updated: 25 0718     Special Requests No Special Requests        Culture No growth 4 days       Culture, Blood 2 [[id number]] Collected: 25    Order Status: Completed Specimen: Blood Updated: 25 0718     Special Requests No Special Requests        Culture No growth 4 days       COVID-19 & Influenza Combo [[id number]] 
                    Infectious Disease Progress Note           Subjective:   Pt seen and examined at bedside. Denies new complaints, no acute events since last seen. Much less confused   Objective:   Physical Exam:     BP (!) 116/58   Pulse 62   Temp 98.1 °F (36.7 °C) (Oral)   Resp 18   Ht 1.727 m (5' 8\")   Wt 113.4 kg (250 lb)   SpO2 97%   BMI 38.01 kg/m²  O2 Flow Rate (L/min): 2 L/min O2 Device: (S) None (Room air)    Temp (24hrs), Av.8 °F (36.6 °C), Min:97.7 °F (36.5 °C), Max:98.1 °F (36.7 °C)    No intake/output data recorded.   1 -  0700  In: 2632 [P.O.:480]  Out: 525 [Urine:525]    General: NAD, alert and following commands   HEENT: MICHELLE, Moist mucosa   Lungs: CTA b/l, decreased at the bases   Heart: S1S2+, RRR, no murmur  Abdo: Soft, NT, ND, +BS   : no chavez cath   Exts: trace edema, + pulses b/l   Skin: No wounds, No rashes or lesions    Data Review:       Recent Days:    Recent Labs     25  0108 25  0541 25  0739   WBC 9.6 18.4* 17.6*   HGB 13.6 12.2 10.3*   HCT 40.0 35.8* 30.8*    118* 90*     Recent Labs     25  0108 25  0115 25  0739   BUN 12  --  23*   CREATININE 2.25* 1.90* 2.41*       Lab Results   Component Value Date/Time    CRP 8.96 2025 01:08 AM        Microbiology     Results       Procedure Component Value Units Date/Time    Culture, Urine [[id number]] Collected: 25 0620    Order Status: Completed Specimen: Urine Updated: 25 1052     Special Requests --        No Special Requests  Reflexed from [id number]       Culture       No significant growth, <10,000 CFU/mL          Culture, Blood 1 [[id number]] Collected: 25 010    Order Status: Completed Specimen: Blood Updated: 25     Special Requests No Special Requests        Culture No growth 1 day       Culture, Blood 2 [[id number]] Collected: 25    Order Status: Completed Specimen: Blood Updated: 25     Special Requests No 
         Nephrology follow-up        Patient: Misael Oneil MRN: [id number]  SSN: [id number]    YOB: 1950  Age: 75 y.o.  Sex: male      Subjective:   The patient is seen at the bedside  He looks comfortable  Blood pressures are normal  On RA  resolving ROBERT    Past Medical History:   Diagnosis Date    AAA (abdominal aortic aneurysm)     Adrenal adenoma, left     Aneurysm of infrarenal abdominal aorta     4.2 cm    Aneurysm of left common iliac artery     2.7 cm    Aneurysm of left external iliac artery     2.5 cm    Anxiety and depression     Aortic stenosis     Bradycardia     CAD (coronary artery disease)     Carotid artery stenosis, unilateral, right     Cirrhosis (HCC)     COPD (chronic obstructive pulmonary disease) (HCC)     Cyst of epididymis     Diabetes mellitus, type II (HCC)     GERD (gastroesophageal reflux disease)     Heart block AV first degree     History of placement of stent in LAD coronary artery     HLD (hyperlipidemia)     Hypertension     Internal carotid artery stent present     Lung nodule     MI (myocardial infarction) (HCC)     pt states several MI's 1st 1998 and last MI 2008    SILVINA (obstructive sleep apnea)     pt states not using CPAP    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     Toe fracture, right      Past Surgical History:   Procedure Laterality Date    BACK SURGERY  02/19/2020    lower back    BLADDER SURGERY N/A 5/8/2025    TRANSURETHRAL RESECTION OF BLADDER TUMOR, OPTIMAL INTERNAL URETEROTOMY, URETHRAL DILATION performed by Jer Abdi II, MD at [facility] MAIN OR    BLEPHAROPLASTY Bilateral     CARDIAC CATHETERIZATION      LAD stent x1    CAROTID STENT PLACEMENT Left     CHOLECYSTECTOMY      COLONOSCOPY  02/2016    CORONARY ARTERY BYPASS GRAFT      LUMBAR DISC SURGERY      Unsure if fusion      Family History   Problem Relation Age of Onset    Hypertension Son     Diabetes Father  
         Nephrology follow-up        Patient: Misael Oneil MRN: [id number]  SSN: [id number]    YOB: 1950  Age: 75 y.o.  Sex: male      Subjective:   The patient is seen at the bedside  He looks comfortable  Blood pressures are normal  On nasal cannula 2 L  Worsening ROBERT    Past Medical History:   Diagnosis Date    AAA (abdominal aortic aneurysm)     Adrenal adenoma, left     Aneurysm of infrarenal abdominal aorta     4.2 cm    Aneurysm of left common iliac artery     2.7 cm    Aneurysm of left external iliac artery     2.5 cm    Anxiety and depression     Aortic stenosis     Bradycardia     CAD (coronary artery disease)     Carotid artery stenosis, unilateral, right     Cirrhosis (HCC)     COPD (chronic obstructive pulmonary disease) (HCC)     Cyst of epididymis     Diabetes mellitus, type II (HCC)     GERD (gastroesophageal reflux disease)     Heart block AV first degree     History of placement of stent in LAD coronary artery     HLD (hyperlipidemia)     Hypertension     Internal carotid artery stent present     Lung nodule     MI (myocardial infarction) (HCC)     pt states several MI's 1st 1998 and last MI 2008    SILVINA (obstructive sleep apnea)     pt states not using CPAP    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     Toe fracture, right      Past Surgical History:   Procedure Laterality Date    BACK SURGERY  02/19/2020    lower back    BLADDER SURGERY N/A 5/8/2025    TRANSURETHRAL RESECTION OF BLADDER TUMOR, OPTIMAL INTERNAL URETEROTOMY, URETHRAL DILATION performed by Jer Abdi II, MD at [facility] MAIN OR    BLEPHAROPLASTY Bilateral     CARDIAC CATHETERIZATION      LAD stent x1    CAROTID STENT PLACEMENT Left     CHOLECYSTECTOMY      COLONOSCOPY  02/2016    CORONARY ARTERY BYPASS GRAFT      LUMBAR DISC SURGERY      Unsure if fusion      Family History   Problem Relation Age of Onset    Hypertension Son     
         Nephrology follow-up        Patient: Misael Oneil MRN: [id number]  SSN: [id number]    YOB: 1950  Age: 75 y.o.  Sex: male      Subjective:   The patient is seen at the bedside  He looks comfortable  Blood pressures are normal  On nasal cannula 2 L to RA  resolving ROBERT    Past Medical History:   Diagnosis Date    AAA (abdominal aortic aneurysm)     Adrenal adenoma, left     Aneurysm of infrarenal abdominal aorta     4.2 cm    Aneurysm of left common iliac artery     2.7 cm    Aneurysm of left external iliac artery     2.5 cm    Anxiety and depression     Aortic stenosis     Bradycardia     CAD (coronary artery disease)     Carotid artery stenosis, unilateral, right     Cirrhosis (HCC)     COPD (chronic obstructive pulmonary disease) (HCC)     Cyst of epididymis     Diabetes mellitus, type II (HCC)     GERD (gastroesophageal reflux disease)     Heart block AV first degree     History of placement of stent in LAD coronary artery     HLD (hyperlipidemia)     Hypertension     Internal carotid artery stent present     Lung nodule     MI (myocardial infarction) (HCC)     pt states several MI's 1st 1998 and last MI 2008    SILVINA (obstructive sleep apnea)     pt states not using CPAP    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 MAIN OR    BLEPHAROPLASTY Bilateral     CARDIAC CATHETERIZATION      LAD stent x1    CAROTID STENT PLACEMENT Left     CHOLECYSTECTOMY      COLONOSCOPY  02/2016    CORONARY ARTERY BYPASS GRAFT      LUMBAR DISC SURGERY      Unsure if fusion      Family History   Problem Relation Age of Onset    Hypertension Son 
         Nephrology follow-up        Patient: Misael Oneil MRN: [id number]  SSN: [id number]    YOB: 1950  Age: 75 y.o.  Sex: male      Subjective:   The patient is seen at the bedside  He looks comfortable  Blood pressures are normal  On RA  resolving ROBERT    Past Medical History:   Diagnosis Date    AAA (abdominal aortic aneurysm)     Adrenal adenoma, left     Aneurysm of infrarenal abdominal aorta     4.2 cm    Aneurysm of left common iliac artery     2.7 cm    Aneurysm of left external iliac artery     2.5 cm    Anxiety and depression     Aortic stenosis     Bradycardia     CAD (coronary artery disease)     Carotid artery stenosis, unilateral, right     Cirrhosis (HCC)     COPD (chronic obstructive pulmonary disease) (HCC)     Cyst of epididymis     Diabetes mellitus, type II (HCC)     GERD (gastroesophageal reflux disease)     Heart block AV first degree     History of placement of stent in LAD coronary artery     HLD (hyperlipidemia)     Hypertension     Internal carotid artery stent present     Lung nodule     MI (myocardial infarction) (HCC)     pt states several MI's 1st 1998 and last MI 2008    SILVINA (obstructive sleep apnea)     pt states not using CPAP    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     Toe fracture, right      Past Surgical History:   Procedure Laterality Date    BACK SURGERY  02/19/2020    lower back    BLADDER SURGERY N/A 5/8/2025    TRANSURETHRAL RESECTION OF BLADDER TUMOR, OPTIMAL INTERNAL URETEROTOMY, URETHRAL DILATION performed by Jer Abdi II, MD at [facility] MAIN OR    BLEPHAROPLASTY Bilateral     CARDIAC CATHETERIZATION      LAD stent x1    CAROTID STENT PLACEMENT Left     CHOLECYSTECTOMY      COLONOSCOPY  02/2016    CORONARY ARTERY BYPASS GRAFT      LUMBAR DISC SURGERY      Unsure if fusion      Family History   Problem Relation Age of Onset    Hypertension Son     Diabetes Father  
         Nephrology follow-up        Patient: Misael Oneil MRN: [id number]  SSN: [id number]    YOB: 1950  Age: 75 y.o.  Sex: male      Subjective:   The patient is seen at the bedside  He looks comfortable  Blood pressures are elevated  On RA  resolving ROBERT    Past Medical History:   Diagnosis Date    AAA (abdominal aortic aneurysm)     Adrenal adenoma, left     Aneurysm of infrarenal abdominal aorta     4.2 cm    Aneurysm of left common iliac artery     2.7 cm    Aneurysm of left external iliac artery     2.5 cm    Anxiety and depression     Aortic stenosis     Bradycardia     CAD (coronary artery disease)     Carotid artery stenosis, unilateral, right     Cirrhosis (HCC)     COPD (chronic obstructive pulmonary disease) (HCC)     Cyst of epididymis     Diabetes mellitus, type II (HCC)     GERD (gastroesophageal reflux disease)     Heart block AV first degree     History of placement of stent in LAD coronary artery     HLD (hyperlipidemia)     Hypertension     Internal carotid artery stent present     Lung nodule     MI (myocardial infarction) (HCC)     pt states several MI's 1st 1998 and last MI 2008    SILVINA (obstructive sleep apnea)     pt states not using CPAP    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     Toe fracture, right      Past Surgical History:   Procedure Laterality Date    BACK SURGERY  02/19/2020    lower back    BLADDER SURGERY N/A 5/8/2025    TRANSURETHRAL RESECTION OF BLADDER TUMOR, OPTIMAL INTERNAL URETEROTOMY, URETHRAL DILATION performed by Jer Abdi II, MD at [facility] MAIN OR    BLEPHAROPLASTY Bilateral     CARDIAC CATHETERIZATION      LAD stent x1    CAROTID STENT PLACEMENT Left     CHOLECYSTECTOMY      COLONOSCOPY  02/2016    CORONARY ARTERY BYPASS GRAFT      LUMBAR DISC SURGERY      Unsure if fusion      Family History   Problem Relation Age of Onset    Hypertension Son     Diabetes 
        Progress Note      7/17/2025 12:11 PM  NAME: Misael Oneil   MRN:[id number]   Admit Diagnosis: Sepsis (HCC) [A41.9]  Altered mental status, unspecified altered mental status type [R41.82]  Sepsis, due to unspecified organism, unspecified whether acute organ dysfunction present (HCC) [A41.9]      Subjective:   07/17/2025 chart reviewed.  Patient feels much better.  Has had abdominal ultrasound to evaluate for abdominal aortic aneurysm.    Review of Systems:    Symptom Y/N Comments  Symptom Y/N Comments   Fever/Chills y Low-grade  Chest Pain n    Poor Appetite    Edema     Cough    Abdominal Pain n    Sputum    Joint Pain     SOB/BALES n   Pruritis/Rash     Nausea/vomit    Other     Diarrhea         Constipation           Could NOT obtain due to:      Objective:          Physical Exam:    Last 24hrs VS reviewed since prior progress note. Most recent are:    BP (!) 152/76   Pulse 58   Temp 99 °F (37.2 °C) (Oral)   Resp 18   Ht 1.727 m (5' 8\")   Wt 113.4 kg (250 lb)   SpO2 95%   BMI 38.01 kg/m²     Intake/Output Summary (Last 24 hours) at 7/17/2025 1211  Last data filed at 7/17/2025 0834  Gross per 24 hour   Intake 150 ml   Output 1950 ml   Net -1800 ml        General Appearance: Well developed, well nourished, alert & oriented x 3,    no acute distress.  Ears/Nose/Mouth/Throat: Hearing grossly normal.  Neck: Supple.  Chest: Lungs clear to auscultation bilaterally.  Cardiovascular: Regular rate and rhythm, S1,S2 normal, no murmur.  Abdomen: Soft, non-tender, bowel sounds are active.  Extremities: No edema bilaterally.  Skin: Warm and dry.    []         Post-cath site without hematoma, bruit, tenderness, or thrill.  Distal pulses intact.    PMH/SH reviewed - no change compared to H&P    Data Review    Telemetry: normal sinus rhythm     EKG:   []  No new EKG for review    Lab Data Personally Reviewed:    Recent Labs     07/15/25  0857 07/16/25  0626   WBC 11.5* 7.6   HGB 10.2* 9.9*   HCT 29.5* 29.1*   PLT 97* 
        Progress Note      7/18/2025 11:07 AM  NAME: Misael Oneil   MRN:[id number]   Admit Diagnosis: Sepsis (HCC) [A41.9]  Altered mental status, unspecified altered mental status type [R41.82]  Sepsis, due to unspecified organism, unspecified whether acute organ dysfunction present (HCC) [A41.9]      Subjective:   07/18/2025 chart reviewed.  Patient feels much better.  Has had abdominal ultrasound to evaluate for abdominal aortic aneurysm.  I discussed the findings of abdominal aortic ultrasound and he has a abdominal aortic aneurysm and has also aneurysm of the left common iliac artery and probably right iliac artery stenosis.  I discussed this with the patient and he states that his primary cardiologist Dr. Blandon he is aware of it and is monitoring.    Review of Systems:    Symptom Y/N Comments  Symptom Y/N Comments   Fever/Chills y Low-grade  Chest Pain n    Poor Appetite    Edema     Cough    Abdominal Pain n    Sputum    Joint Pain     SOB/BALES n   Pruritis/Rash     Nausea/vomit    Other     Diarrhea         Constipation           Could NOT obtain due to:      Objective:          Physical Exam:    Last 24hrs VS reviewed since prior progress note. Most recent are:    BP (!) 146/67   Pulse 52   Temp 97.9 °F (36.6 °C) (Oral)   Resp 18   Ht 1.727 m (5' 8\")   Wt 113.4 kg (250 lb)   SpO2 96%   BMI 38.01 kg/m²     Intake/Output Summary (Last 24 hours) at 7/18/2025 1107  Last data filed at 7/18/2025 0620  Gross per 24 hour   Intake 133.75 ml   Output 1600 ml   Net -1466.25 ml        General Appearance: Well developed, well nourished, alert & oriented x 3,    no acute distress.  Ears/Nose/Mouth/Throat: Hearing grossly normal.  Neck: Supple.  Chest: Lungs clear to auscultation bilaterally.  Cardiovascular: Regular rate and rhythm, S1,S2 normal, no murmur.  Abdomen: Soft, non-tender, bowel sounds are active.  Extremities: No edema bilaterally.  Skin: Warm and dry.    []         Post-cath site without hematoma, 
    Hospitalist Progress Note               Daily Progress Note: 7/13/2025      Hospital Day: 1     Chief complaint:   Chief Complaint   Patient presents with    Altered Mental Status        Subjective:   Hospital course to date:    Misael Oneil is a 75 y.o.  male with PMHx significant for CAD, diabetes mellitus, GERD, hypertension, SILVINA noncompliant with CPAP, chronic back pain, COPD, hyperlipidemia, history of CVA anxiety/depression, BPH was brought into the ED for evaluation of altered mental status and was covered in fecal matter and urine.  Reportedly patient was last well-known yesterday.     During initial hospitalist evaluation at bedside, patient awake and alert x 3, slow to respond, falling asleep very fast.  States he is doing better, however is very sleepy.  Was able to tell me place month and name not year.  States yesterday he fell, then was unable to get up also having shortness of breath.  Denies any fever, chills, nausea, vomiting.  Denies any dysuria, thinks might have trouble urinating, unable to tell me clearly.  Per reports, patient did not have watery diarrhea, had soft bowel movement that was smeared all over him.     Of note, recently in May had transurethral resection of bladder tumor, ureteral dilatation.     In the ED, afebrile tachypneic tachycardic, soft blood pressure.  CBC unremarkable, CMP shows hypokalemia of 3.2, creatinine of 2.25, baseline is 1.27, bicarb is 17, anion gap 18.  POC shows lactic acid of 11.63.  pH is 7.73, LFT shows elevated ALP, T. bili is 1.2, AST ALT unremarkable.  COVID flu negative.  Chest x-ray unremarkable CT of the head shows previous history of chronic parietal infarct, no acute finding.  Patient received broad-spectrum antibiotics with vancomycin and Zosyn and sepsis fluid bolus.  Medicine service requested for altered mental status and sepsis of unclear etiology.  UA was never obtained in the ED     We were asked to admit for work up and evaluation of the 
    Hospitalist Progress Note               Daily Progress Note: 7/14/2025      Hospital Day: 2     Chief complaint:   Chief Complaint   Patient presents with    Altered Mental Status        Subjective:   Hospital course to date:    Misael Oneil is a 75 y.o.  male with PMHx significant for CAD, diabetes mellitus, GERD, hypertension, SILVINA noncompliant with CPAP, chronic back pain, COPD, hyperlipidemia, history of CVA anxiety/depression, BPH was brought into the ED for evaluation of altered mental status and was covered in fecal matter and urine.  Reportedly patient was last well-known yesterday.     During initial hospitalist evaluation at bedside, patient awake and alert x 3, slow to respond, falling asleep very fast.  States he is doing better, however is very sleepy.  Was able to tell me place month and name not year.  States yesterday he fell, then was unable to get up also having shortness of breath.  Denies any fever, chills, nausea, vomiting.  Denies any dysuria, thinks might have trouble urinating, unable to tell me clearly.  Per reports, patient did not have watery diarrhea, had soft bowel movement that was smeared all over him.     Of note, recently in May had transurethral resection of bladder tumor, ureteral dilatation.     In the ED, afebrile tachypneic tachycardic, soft blood pressure.  CBC unremarkable, CMP shows hypokalemia of 3.2, creatinine of 2.25, baseline is 1.27, bicarb is 17, anion gap 18.  POC shows lactic acid of 11.63.  pH is 7.73, LFT shows elevated ALP, T. bili is 1.2, AST ALT unremarkable.  COVID flu negative.  Chest x-ray unremarkable CT of the head shows previous history of chronic parietal infarct, no acute finding.  Patient received broad-spectrum antibiotics with vancomycin and Zosyn and sepsis fluid bolus.  Medicine service requested for altered mental status and sepsis of unclear etiology.  UA was never obtained in the ED     We were asked to admit for work up and evaluation of the 
    Hospitalist Progress Note               Daily Progress Note: 7/15/2025      Hospital Day: 3     Chief complaint:   Chief Complaint   Patient presents with    Altered Mental Status        Subjective:   Hospital course to date:    Misael Oneil is a 75 y.o.  male with PMHx significant for CAD, diabetes mellitus, GERD, hypertension, SILVINA noncompliant with CPAP, chronic back pain, COPD, hyperlipidemia, history of CVA anxiety/depression, BPH was brought into the ED for evaluation of altered mental status and was covered in fecal matter and urine.  Reportedly patient was last well-known yesterday.     During initial hospitalist evaluation at bedside, patient awake and alert x 3, slow to respond, falling asleep very fast.  States he is doing better, however is very sleepy.  Was able to tell me place month and name not year.  States yesterday he fell, then was unable to get up also having shortness of breath.  Denies any fever, chills, nausea, vomiting.  Denies any dysuria, thinks might have trouble urinating, unable to tell me clearly.  Per reports, patient did not have watery diarrhea, had soft bowel movement that was smeared all over him.     Of note, recently in May had transurethral resection of bladder tumor, ureteral dilatation.     In the ED, afebrile tachypneic tachycardic, soft blood pressure.  CBC unremarkable, CMP shows hypokalemia of 3.2, creatinine of 2.25, baseline is 1.27, bicarb is 17, anion gap 18.  POC shows lactic acid of 11.63.  pH is 7.73, LFT shows elevated ALP, T. bili is 1.2, AST ALT unremarkable.  COVID flu negative.  Chest x-ray unremarkable CT of the head shows previous history of chronic parietal infarct, no acute finding.  Patient received broad-spectrum antibiotics with vancomycin and Zosyn and sepsis fluid bolus.  Medicine service requested for altered mental status and sepsis of unclear etiology.  UA was never obtained in the ED     We were asked to admit for work up and evaluation of the 
  Physician Progress Note      PATIENT:               SAVAGE MOODY  CSN #:                  [id number]  :                       1950  ADMIT DATE:       2025 1:01 AM  DISCH DATE:  RESPONDING  PROVIDER #:        Matilde Pena MD          QUERY TEXT:    Sepsis is documented in the medical record H&P . Please provide additional   clinical indicators supportive of your documentation. Or please document if   the diagnosis of sepsis has been ruled out after study.    The clinical indicators include:  H&P   Severe sepsis of unclear etiology  Lactic acidosis, improving  -Has SIRS of tachycardia tachypnea and lactic acidosis  -Continue IV Zosyn, given history of urological procedure  -Status post sepsis fluid bolus  -Continue IVF  -Lactic acid improving trend lactic acid  -COVID flu negative, chest x-ray unremarkable, low suspicion for pneumonia  -Follow-up blood culture, mycoplasma, Legionella, Pro-Bradley, CRP, UA  - Consider infectious disease if sepsis does not improve    ID   1. SIRS/Sepsis on admission, UTI was suspected but Cx neg  Afebrile, WBC 17.6K down from 18.4  No chronic wounds or focal infiltrates on CXR ()  Will leave on Zosyn monotherapy for now  Routine labs in AM including CRP, and Procal    pn   Severe sepsis 2/2 UTI  S IRS positive with tachycardia, leukocytosis, elevated lactate and   hypotension)  Lactic acidosis, improving  -Has SIRS of tachycardia tachypnea and lactic acidosis  -Continue IV Zosyn, given history of urological procedure  -Status post sepsis fluid bolus  -Continue IVF  -Lactic acid improving trend lactic acid  -COVID flu negative, chest x-ray unremarkable, low suspicion for pneumonia  - Procalcitonin 24  -urinalysis> turbid appearance, large leukocyte Estrace, positive nitrites and   + 4 bacteria  Options provided:  -- Sepsis present as evidenced by, Please document evidence.  -- Sepsis was ruled out after study  -- Other - I will add my own diagnosis  -- 
4 Eyes Skin Assessment     NAME:  Misael Oneil  YOB: 1950  MEDICAL RECORD NUMBER:  [id number]    The patient is being assessed for  Shift Handoff    I agree that at least one RN has performed a thorough Head to Toe Skin Assessment on the patient. ALL assessment sites listed below have been assessed.      Areas assessed by both nurses:    Head, Face, Ears, Shoulders, Back, Chest, Arms, Elbows, Hands, Sacrum. Buttock, Coccyx, Ischium, and Legs. Feet and Heels        Does the Patient have a Wound? No noted wound(s)       Kamar Prevention initiated by RN: Yes  Wound Care Orders initiated by RN: No    Pressure Injury (Stage 1,2,3,4, Unstageable, DTI, NWPT, and Complex wounds) if present, place Wound referral order by RN under : No    New Ostomies, if present place, Ostomy referral order under : No     Nurse 1 eSignature: Electronically signed by Sylvia Echols RN on 7/16/25 at 11:52 AM EDT    **SHARE this note so that the co-signing nurse can place an eSignature**    Nurse 2 eSignature: Electronically signed by Marcy Russo RN on 7/16/25 at 6:35 PM EDT   
4 Eyes Skin Assessment     NAME:  Misael Oneil  YOB: 1950  MEDICAL RECORD NUMBER:  [id number]    The patient is being assessed for  Admission    I agree that at least one RN has performed a thorough Head to Toe Skin Assessment on the patient. ALL assessment sites listed below have been assessed.      Areas assessed by both nurses:    Head, Face, Ears, Shoulders, Back, Chest, Arms, Elbows, Hands, Sacrum. Buttock, Coccyx, Ischium, Legs. Feet and Heels, and Under Medical Devices         Does the Patient have a Wound? No noted wound(s)       Kamar Prevention initiated by RN: Yes  Wound Care Orders initiated by RN: No    Pressure Injury (Stage 1,2,3,4, Unstageable, DTI, NWPT, and Complex wounds) if present, place Wound referral order by RN under : No    New Ostomies, if present place, Ostomy referral order under : No     Nurse 1 eSignature: Electronically signed by Grace Cristina RN on 7/13/25 at 6:05 AM EDT    **SHARE this note so that the co-signing nurse can place an eSignature**    Nurse 2 eSignature: Electronically signed by Ivanna Galindo RN on 7/13/25 at 7:32 AM EDT    
Aorta duplex completed. Final results to follow.  
Echo completed. Report to follow.    
OT eval order received and acknowledged. OT eval attempted at 8:53 AM however RITCHIE for CVL. Will continue to follow patient and attempt OT eval at a later time. Thank you.   
PT treatment session attempted at 0854, however pt off the floor at this time. Will continue to check on pt and see them for treatment session at a later time. Thank you.   
Provider made aware of pts bradycardia  
Provider notified of 5 beat run of vtach; patient asymptomatic and voices no complaints at this time;; currently running first degree AV block on the monitor  
Provider notified of patients first degree AV block via secure chat  
Pt SB at baseline, did dip to 40's during sleep as low as 42 and sustaining, asymptomatic while awake, MD notified.  
No growth 1 day       Culture, Blood 2 [[id number]] Collected: 07/13/25 0108    Order Status: Completed Specimen: Blood Updated: 07/14/25 0551     Special Requests No Special Requests        Culture No growth 1 day       COVID-19 & Influenza Combo [[id number]] Collected: 07/13/25 0108    Order Status: Completed Specimen: Nasopharyngeal Updated: 07/13/25 0214     SARS-CoV-2, PCR Not Detected        Comment: Not Detected results do not preclude SARS-CoV-2 infection and should not be used as the sole basis for patient management decisions. Results must be combined with clinical observations, patient history, and epidemiological information        Rapid Influenza A By PCR Not Detected        Rapid Influenza B By PCR Not Detected        Comment:    Testing was performed using hebert Shameka SARS-CoV-2 and Influenza A/B nucleic acid assay.  This test is a multiplex Real-Time Reverse Transcriptas                  Diagnostic   US RETROPERITONEAL COMPLETE  Result Date: 7/14/2025  No hydronephrosis. Electronically signed by TOI TRIMBLE    CT HEAD WO CONTRAST  Result Date: 7/13/2025  No acute findings. Chronic right parietal infarct and chronic microvascular ischemic white matter change. Electronically signed by Ren Parry    XR CHEST 1 VIEW  Result Date: 7/13/2025  No acute findings. Electronically signed by Ren Parry      Assessment/Plan     SIRS/Sepsis on admission, significant leukocytosis, has been afebrile and hemodynamically         Still no source of acute infection, no chronic wounds, UA was abnormal but culture neg         No focal infiltrates on CXR, denies productive cough, maintaining O2 sats on RA        Denies abdominal pain, benign exam, no changes in bowel movement         Remains afebrile, leukocytosis trending down on today's labs, CRP 12.10        Will leave on empiric Zosyn day #3/7 while in place, transition an oral option upon d/c, Augmentin or a quinolone          Routine labs in AM      2. UTI, 
Drugs/treatments that require intensive monitoring for toxicity include:    [x] IV ABX requiring serial renal monitoring for nephrotoxicity:     [] IV Narcotic analgesia for adverse drug reaction  [] Aggressive IV diuresis requiring serial monitoring for renal impairment and electrolyte derangements  [x] Critical electrolyte abnormalities requiring IV replacement and close serial monitoring  [] SQ Insulin SS- monitoring serial FSBS for Hypoglycemic adverse drug reaction  [] Other -   [] Change in code status:    [] Decision to escalate care:    [] Major surgery/procedure with associated risk factors:    ----------------------------------------------------------------------  C. Data (any 2)  [] Discussed current management and discharge planning options with Case Management.  [] Discussed management of the case with:    [] Telemetry personally reviewed and interpreted as documented above    [] Imaging personally reviewed and interpreted, includes:    [] Data Review (any 3)  [] All available Consultant notes from yesterday/today were reviewed  [] All current labs were reviewed and interpreted for clinical significance   [] Appropriate follow-up labs were ordered  [] Collateral history obtained from:               Assessment:  Severe sepsis 2/2 UTI  S IRS positive with tachycardia, leukocytosis, elevated lactate and hypotension)  Lactic acidosis, improving  -Has SIRS of tachycardia tachypnea and lactic acidosis  -Continue IV Zosyn, given history of urological procedure  -Status post sepsis fluid bolus  -Continue IVF  -Lactic acid improving trend lactic acid     -COVID flu negative, chest x-ray unremarkable, low suspicion for pneumonia  - Procalcitonin 24  -urinalysis> turbid appearance, large leukocyte Estrace, positive nitrites and + 4 bacteria but urine culture is negative.      Hypotension, improved, multifactorial   Hypertension  - Could be due to decreased oral intake/diarrhea, sepsis  -discontinue IV fluid  -

## 2025-07-18 NOTE — DISCHARGE SUMMARY
Physician Discharge Summary     Patient ID:    Misael Oneil  [id number]  75 y.o.  1950    Admit date: 7/13/2025    Discharge date : 7/18/2025      Final Diagnoses:   Sepsis (HCC) [A41.9]  Altered mental status, unspecified altered mental status type [R41.82]  Sepsis, due to unspecified organism, unspecified whether acute organ dysfunction present (HCC) [A41.9]  alternator status 2/2 severe sepsis 2/2 UTI  hypertension 2/2 severe sepsis 2/2 UTI  BPH  history of CVA  sinus bradycardia  left hip bruising 2/2 fall, rule out fracture  AK I on CKD  history of CAD  SILVINA, not compliant with CPAP  diabetes mellitus  Mobitz type one 2nd° AV block    Reason for Hospitalization:    Misael Oneil is a 75 y.o.  male with PMHx significant for CAD, diabetes mellitus, GERD, hypertension, SILVINA noncompliant with CPAP, chronic back pain, COPD, hyperlipidemia, history of CVA anxiety/depression, BPH was brought into the ED for evaluation of altered mental status and was covered in fecal matter and urine.  Reportedly patient was last well-known yesterday.     During my evaluation by bedside, patient awake and alert x 3, slow to respond, falling asleep very fast.  States he is doing better, however is very sleepy.  Was able to tell me place month and name not year.  States yesterday he fell, then was unable to get up also having shortness of breath.  Denies any fever, chills, nausea, vomiting.  Denies any dysuria, thinks might have trouble urinating, unable to tell me clearly.  Per reports, patient did not have watery diarrhea, had soft bowel movement that was smeared all over him.     Of note, recently in May had transurethral resection of bladder tumor, ureteral dilatation.     In the ED, afebrile tachypneic tachycardic, soft blood pressure.  CBC unremarkable, CMP shows hypokalemia of 3.2, creatinine of 2.25, baseline is 1.27, bicarb is 17, anion gap 18.  POC shows lactic acid of 11.63.  pH is 7.73, LFT shows

## 2025-07-18 NOTE — CARE COORDINATION
1200: Discharge summary/order noted this morning.     Discharge order pending abdominal ultrasound and results.    Retroperitoneal complete noted on 07/14/2025 with no further ultrasound orders.    Discussed above in IDR meeting and with nurse.    Nurse has messaged attending in regards to above.    1315: Per nurse, patient has been cleared for discharge.    When transportation is available, patient to discharge per order.    Transition of Care Plan:    RUR: 14%  Prior Level of Functioning: Independent  Disposition: Home  JADEN: 07/18/2025  If SNF or IPR: Date FOC offered: Declined  Date FOC received: Declined HH/SNF  Accepting facility: N/A  Date authorization started with reference number: N/A  Date authorization received and expires: N/A  Follow up appointments: Discharge Summary  DME needed: None  Transportation at discharge: Family  IM/IMM Medicare/ letter given: 07/17/2025  Is patient a  and connected with [facility]? No  If yes, was [facility] transfer form completed and [facility] notified? N/A  Caregiver Contact: N/A  Discharge Caregiver contacted prior to discharge? N/A  Care Conference needed? No  Barriers to discharge: None

## 2025-07-18 NOTE — PLAN OF CARE
PHYSICAL THERAPY TREATMENT     Patient: Misael Oneil (75 y.o. male)  Date: 7/15/2025  Diagnosis: Sepsis (HCC) [A41.9]  Altered mental status, unspecified altered mental status type [R41.82]  Sepsis, due to unspecified organism, unspecified whether acute organ dysfunction present (HCC) [A41.9] Sepsis (HCC)      Precautions:                        Recommendations for nursing mobility: Out of bed to chair for meals, Encourage HEP in prep for ADLs/mobility; see handout for details, Frequent repositioning to prevent skin breakdown, Use of bed/chair alarm for safety, Use of BSC for toileting , AD and gt belt for bed to chair , and Assist x1    In place during session: Peripheral IV, External Catheter, and EKG/telemetry   Chart, physical therapy assessment, plan of care and goals were reviewed.  ASSESSMENT  Patient continues with skilled PT services and is progressing towards goals. Pt supine in bed upon PTA arrival, agreeable to session. (See below for objective details and assist levels).     Overall pt tolerated session fair today. Pt transferred to eob with verbal cues and min A. Pt stood impulsively from bed and when asked to sit back down, stumbled onto the bed. Pt performed seated LE therex with no c/o pain or discomfort. Pt stood from bed with RW and gait belt and transferred into recliner chair. Pt stood once more from chair to place chair alarm and sat back down. Gait was slow and slightly unsteady with no lob or knee buckling noted. Pt left sitting in recliner chair with all needs met and chair alarm on. Will continue to benefit from skilled PT services, and will continue to progress as tolerated. Current PT DC recommendation Moderate intensity short-term skilled physical therapy up to 5x/week once medically appropriate.    GOALS:  Problem: Physical Therapy - Adult  Goal: By Discharge: Performs mobility at highest level of function for planned discharge setting.  See evaluation for individualized 
         PHYSICAL THERAPY TREATMENT     Patient: Misael Oneil (75 y.o. male)  Date: 7/16/2025  Diagnosis: Sepsis (HCC) [A41.9]  Altered mental status, unspecified altered mental status type [R41.82]  Sepsis, due to unspecified organism, unspecified whether acute organ dysfunction present (HCC) [A41.9] Sepsis (HCC)      Precautions:                        Recommendations for nursing mobility: Out of bed to chair for meals, Encourage HEP in prep for ADLs/mobility; see handout for details, Frequent repositioning to prevent skin breakdown, AD and gt belt for bed to chair , Amb to bathroom with AD and gait belt, and Assist x1    In place during session: External Catheter and EKG/telemetry   Chart, physical therapy assessment, plan of care and goals were reviewed.  ASSESSMENT  Patient continues with skilled PT services and is progressing towards goals. Pt supine in bed upon PTA arrival, agreeable to session. (See below for objective details and assist levels).     Overall pt tolerated session fair today. Pt transferred to eob and performed seated LE therex with no c/o pain or discomfort. Pt stood from bed and ambulated ~20 ft with no AD and cga/min with slightly unsteady gait. Pt sat in recliner chair and was left with all needs met. Will continue to benefit from skilled PT services, and will continue to progress as tolerated. Current PT DC recommendation Moderate intensity short-term skilled physical therapy up to 5x/week once medically appropriate.    GOALS:  Problem: Physical Therapy - Adult  Goal: By Discharge: Performs mobility at highest level of function for planned discharge setting.  See evaluation for individualized goals.  Description: FUNCTIONAL STATUS PRIOR TO ADMISSION: The patient  required minimal assistance for basic and instrumental ADLs.    HOME SUPPORT PRIOR TO ADMISSION: The patient lived with daughter and required minimal assistance for ADLs.    Physical Therapy Goals  Initiated 7/13/2025  Pt 
  Problem: Chronic Conditions and Co-morbidities  Goal: Patient's chronic conditions and co-morbidity symptoms are monitored and maintained or improved  7/13/2025 1118 by Anastasia Smith RN  Outcome: Progressing  7/13/2025 0546 by Grace Cristina RN  Outcome: Progressing     Problem: Discharge Planning  Goal: Discharge to home or other facility with appropriate resources  7/13/2025 1118 by Anastasia Smith RN  Outcome: Progressing  7/13/2025 0546 by Grace Cristina RN  Outcome: Progressing     Problem: Safety - Adult  Goal: Free from fall injury  7/13/2025 1118 by Anastasia Smith RN  Outcome: Progressing  7/13/2025 0546 by Grace Cristina RN  Outcome: Progressing     Problem: Skin/Tissue Integrity  Goal: Skin integrity remains intact  Description: 1.  Monitor for areas of redness and/or skin breakdown  2.  Assess vascular access sites hourly  3.  Every 4-6 hours minimum:  Change oxygen saturation probe site  4.  Every 4-6 hours:  If on nasal continuous positive airway pressure, respiratory therapy assess nares and determine need for appliance change or resting period  7/13/2025 1118 by Anastasia Smith RN  Outcome: Progressing  7/13/2025 0546 by Grace Cristina RN  Outcome: Progressing     Problem: ABCDS Injury Assessment  Goal: Absence of physical injury  7/13/2025 1118 by Anastasia Smith RN  Outcome: Progressing  7/13/2025 0546 by Grace Cristina RN  Outcome: Progressing     
  Problem: Chronic Conditions and Co-morbidities  Goal: Patient's chronic conditions and co-morbidity symptoms are monitored and maintained or improved  7/14/2025 1101 by Anastasia Smith RN  Outcome: Progressing  7/14/2025 0132 by Grace Cristina RN  Outcome: Progressing  Flowsheets (Taken 7/13/2025 2000)  Care Plan - Patient's Chronic Conditions and Co-Morbidity Symptoms are Monitored and Maintained or Improved: Monitor and assess patient's chronic conditions and comorbid symptoms for stability, deterioration, or improvement     Problem: Discharge Planning  Goal: Discharge to home or other facility with appropriate resources  7/14/2025 1101 by Anastasia Smith RN  Outcome: Progressing  7/14/2025 0132 by Grace Cristina RN  Outcome: Progressing     Problem: Safety - Adult  Goal: Free from fall injury  7/14/2025 1101 by Anastasia Smith RN  Outcome: Progressing  7/14/2025 0132 by Grace Cristina RN  Outcome: Progressing     Problem: Skin/Tissue Integrity  Goal: Skin integrity remains intact  Description: 1.  Monitor for areas of redness and/or skin breakdown  2.  Assess vascular access sites hourly  3.  Every 4-6 hours minimum:  Change oxygen saturation probe site  4.  Every 4-6 hours:  If on nasal continuous positive airway pressure, respiratory therapy assess nares and determine need for appliance change or resting period  7/14/2025 1101 by Anastasia Smith RN  Outcome: Progressing  7/14/2025 0132 by Grace Cristina RN  Outcome: Progressing  Flowsheets (Taken 7/13/2025 2000)  Skin Integrity Remains Intact:   Monitor for areas of redness and/or skin breakdown   Turn and reposition as indicated     Problem: ABCDS Injury Assessment  Goal: Absence of physical injury  7/14/2025 1101 by Anastasia Smith RN  Outcome: Progressing  7/14/2025 0132 by Grace Cristina RN  Outcome: Progressing     Problem: Pain  Goal: Verbalizes/displays adequate comfort level or baseline comfort 
  Problem: Chronic Conditions and Co-morbidities  Goal: Patient's chronic conditions and co-morbidity symptoms are monitored and maintained or improved  7/15/2025 1139 by Anastasia Smith RN  Outcome: Progressing  7/15/2025 0202 by Grace Cristina RN  Outcome: Progressing  Flowsheets (Taken 7/14/2025 2000)  Care Plan - Patient's Chronic Conditions and Co-Morbidity Symptoms are Monitored and Maintained or Improved: Monitor and assess patient's chronic conditions and comorbid symptoms for stability, deterioration, or improvement     Problem: Discharge Planning  Goal: Discharge to home or other facility with appropriate resources  7/15/2025 1139 by Anastasia Smith RN  Outcome: Progressing  7/15/2025 0202 by Grace Cristina RN  Outcome: Progressing     Problem: Safety - Adult  Goal: Free from fall injury  7/15/2025 1139 by Anastasia Smith RN  Outcome: Progressing  7/15/2025 0202 by Grace Cristina RN  Outcome: Progressing     Problem: Skin/Tissue Integrity  Goal: Skin integrity remains intact  Description: 1.  Monitor for areas of redness and/or skin breakdown  2.  Assess vascular access sites hourly  3.  Every 4-6 hours minimum:  Change oxygen saturation probe site  4.  Every 4-6 hours:  If on nasal continuous positive airway pressure, respiratory therapy assess nares and determine need for appliance change or resting period  7/15/2025 1139 by Anastasia Smith RN  Outcome: Progressing  7/15/2025 0202 by Grace Cristina RN  Outcome: Progressing  Flowsheets (Taken 7/14/2025 2000)  Skin Integrity Remains Intact:   Monitor for areas of redness and/or skin breakdown   Turn and reposition as indicated     Problem: ABCDS Injury Assessment  Goal: Absence of physical injury  7/15/2025 1139 by Anastasia Smith RN  Outcome: Progressing  7/15/2025 0202 by Grace Cristina RN  Outcome: Progressing     Problem: Pain  Goal: Verbalizes/displays adequate comfort level or baseline comfort 
  Problem: Chronic Conditions and Co-morbidities  Goal: Patient's chronic conditions and co-morbidity symptoms are monitored and maintained or improved  7/16/2025 1148 by Sylvia Echols RN  Outcome: Progressing  Flowsheets (Taken 7/16/2025 1113)  Care Plan - Patient's Chronic Conditions and Co-Morbidity Symptoms are Monitored and Maintained or Improved: Monitor and assess patient's chronic conditions and comorbid symptoms for stability, deterioration, or improvement  7/16/2025 0437 by Haily Cummings RN  Outcome: Progressing  Flowsheets (Taken 7/15/2025 2134)  Care Plan - Patient's Chronic Conditions and Co-Morbidity Symptoms are Monitored and Maintained or Improved: Monitor and assess patient's chronic conditions and comorbid symptoms for stability, deterioration, or improvement     Problem: Discharge Planning  Goal: Discharge to home or other facility with appropriate resources  7/16/2025 1148 by Sylvia Echols RN  Outcome: Progressing  Flowsheets (Taken 7/16/2025 1113)  Discharge to home or other facility with appropriate resources: Identify barriers to discharge with patient and caregiver  7/16/2025 0437 by Haily Cummings RN  Outcome: Progressing  Flowsheets (Taken 7/15/2025 2134)  Discharge to home or other facility with appropriate resources: Identify barriers to discharge with patient and caregiver     Problem: Safety - Adult  Goal: Free from fall injury  7/16/2025 1148 by Sylvia Echols RN  Outcome: Progressing  7/16/2025 0437 by Haily Cummings RN  Outcome: Progressing     Problem: Skin/Tissue Integrity  Goal: Skin integrity remains intact  Description: 1.  Monitor for areas of redness and/or skin breakdown  2.  Assess vascular access sites hourly  3.  Every 4-6 hours minimum:  Change oxygen saturation probe site  4.  Every 4-6 hours:  If on nasal continuous positive airway pressure, respiratory therapy assess nares and determine need for appliance change or resting period  7/16/2025 1148 by Kinza 
  Problem: Chronic Conditions and Co-morbidities  Goal: Patient's chronic conditions and co-morbidity symptoms are monitored and maintained or improved  7/18/2025 1020 by Josy Robles  Outcome: Progressing  Flowsheets (Taken 7/18/2025 0959)  Care Plan - Patient's Chronic Conditions and Co-Morbidity Symptoms are Monitored and Maintained or Improved: Monitor and assess patient's chronic conditions and comorbid symptoms for stability, deterioration, or improvement  7/17/2025 2054 by Haily Cummings, RN  Outcome: Progressing  Flowsheets (Taken 7/17/2025 2053)  Care Plan - Patient's Chronic Conditions and Co-Morbidity Symptoms are Monitored and Maintained or Improved: Monitor and assess patient's chronic conditions and comorbid symptoms for stability, deterioration, or improvement     Problem: Discharge Planning  Goal: Discharge to home or other facility with appropriate resources  7/18/2025 1020 by Josy Robles  Outcome: Progressing  Flowsheets (Taken 7/18/2025 0959)  Discharge to home or other facility with appropriate resources: Identify barriers to discharge with patient and caregiver  7/17/2025 2054 by Haily Cummings, RN  Outcome: Progressing  Flowsheets (Taken 7/17/2025 2053)  Discharge to home or other facility with appropriate resources: Identify barriers to discharge with patient and caregiver     Problem: Safety - Adult  Goal: Free from fall injury  7/18/2025 1020 by Josy Robles  Outcome: Progressing  7/17/2025 2054 by Haily Cummings, RN  Outcome: Progressing     Problem: Skin/Tissue Integrity  Goal: Skin integrity remains intact  Description: 1.  Monitor for areas of redness and/or skin breakdown  2.  Assess vascular access sites hourly  3.  Every 4-6 hours minimum:  Change oxygen saturation probe site  4.  Every 4-6 hours:  If on nasal continuous positive airway pressure, respiratory therapy assess nares and determine need for appliance change or resting period  7/18/2025 1020 by Josy Robles  Outcome: 
  Problem: Chronic Conditions and Co-morbidities  Goal: Patient's chronic conditions and co-morbidity symptoms are monitored and maintained or improved  Outcome: Progressing     Problem: Discharge Planning  Goal: Discharge to home or other facility with appropriate resources  Outcome: Progressing     Problem: Safety - Adult  Goal: Free from fall injury  Outcome: Progressing     Problem: Skin/Tissue Integrity  Goal: Skin integrity remains intact  Description: 1.  Monitor for areas of redness and/or skin breakdown  2.  Assess vascular access sites hourly  3.  Every 4-6 hours minimum:  Change oxygen saturation probe site  4.  Every 4-6 hours:  If on nasal continuous positive airway pressure, respiratory therapy assess nares and determine need for appliance change or resting period  Outcome: Progressing     Problem: ABCDS Injury Assessment  Goal: Absence of physical injury  Outcome: Progressing     
  Problem: Chronic Conditions and Co-morbidities  Goal: Patient's chronic conditions and co-morbidity symptoms are monitored and maintained or improved  Outcome: Progressing  Flowsheets (Taken 7/13/2025 2000)  Care Plan - Patient's Chronic Conditions and Co-Morbidity Symptoms are Monitored and Maintained or Improved: Monitor and assess patient's chronic conditions and comorbid symptoms for stability, deterioration, or improvement     Problem: Safety - Adult  Goal: Free from fall injury  Outcome: Progressing     Problem: Skin/Tissue Integrity  Goal: Skin integrity remains intact  Description: 1.  Monitor for areas of redness and/or skin breakdown  2.  Assess vascular access sites hourly  3.  Every 4-6 hours minimum:  Change oxygen saturation probe site  4.  Every 4-6 hours:  If on nasal continuous positive airway pressure, respiratory therapy assess nares and determine need for appliance change or resting period  Outcome: Progressing  Flowsheets (Taken 7/13/2025 2000)  Skin Integrity Remains Intact:   Monitor for areas of redness and/or skin breakdown   Turn and reposition as indicated     Problem: Pain  Goal: Verbalizes/displays adequate comfort level or baseline comfort level  Outcome: Progressing     Pt AAOX3-4, [place] w/o his hearing aids, SB on tele, maintaining O2 sats on 2L NC and attempting to wean to RA, IV abx, male purewick in place and pt voiding appropriately this shift, PRN Tylenol given for generalized pain d/t fall at home, bed locked and in low position, call light within reach, bed alarm on.  
  Problem: Chronic Conditions and Co-morbidities  Goal: Patient's chronic conditions and co-morbidity symptoms are monitored and maintained or improved  Outcome: Progressing  Flowsheets (Taken 7/14/2025 2000)  Care Plan - Patient's Chronic Conditions and Co-Morbidity Symptoms are Monitored and Maintained or Improved: Monitor and assess patient's chronic conditions and comorbid symptoms for stability, deterioration, or improvement     Problem: Safety - Adult  Goal: Free from fall injury  Outcome: Progressing     Problem: Skin/Tissue Integrity  Goal: Skin integrity remains intact  Description: 1.  Monitor for areas of redness and/or skin breakdown  2.  Assess vascular access sites hourly  3.  Every 4-6 hours minimum:  Change oxygen saturation probe site  4.  Every 4-6 hours:  If on nasal continuous positive airway pressure, respiratory therapy assess nares and determine need for appliance change or resting period  Outcome: Progressing  Flowsheets (Taken 7/14/2025 2000)  Skin Integrity Remains Intact:   Monitor for areas of redness and/or skin breakdown   Turn and reposition as indicated     Problem: ABCDS Injury Assessment  Goal: Absence of physical injury  Outcome: Progressing     Problem: Pain  Goal: Verbalizes/displays adequate comfort level or baseline comfort level  Outcome: Progressing     Pt AAOX4, [place] w/o home hearing aids, SB on tele especially during sleep, RA, no c/o pain this shift, pt c/o gastric reflux, PRN Maalox ordered and given, IV abx, bed locked and in low position, call light within reach, bed alarm on.  
the task,[place] or still confused. Pt will benefit from continued skilled PT to address above deficits and return to PLOF. Current PT DC recommendation Moderate intensity short-term skilled physical therapy up to 5x/week once medically appropriate.      GOALS:    Problem: Physical Therapy - Adult  Goal: By Discharge: Performs mobility at highest level of function for planned discharge setting.  See evaluation for individualized goals.  Description: FUNCTIONAL STATUS PRIOR TO ADMISSION: The patient  required minimal assistance for basic and instrumental ADLs.    HOME SUPPORT PRIOR TO ADMISSION: The patient lived with daughter and required minimal assistance for ADLs.    Physical Therapy Goals  Initiated 7/13/2025  Pt stated goal: get better  Pt will be I with LE HEP in 7 days.  Pt will perform bed mobility with Supervision in 7 days.  Pt will perform transfers with Supervision in 7 days.   Pt will amb 50 feet with LRAD safely with Supervision in 7 days.  Pt will verbalize and demonstrate compliance with fall precautions  in 7 days.   Pt will demonstrate improvement in standing/gait balance from fair to good in 7 days.    Outcome: Progressing        PLAN :  Recommendations and Planned Interventions: bed mobility training, transfer training, gait training, therapeutic exercises, patient and family training/education, and therapeutic activities    Recommend next PT session: EOB transfers, seated dynamic balance, standing dynamic balance, ambulation with AD , and LE therex    Frequency/Duration: Patient will be followed by physical therapy:  3-5x/week to address goals.    Recommendation for discharge: (in order for the patient to meet his/her long term goals)  Moderate intensity short-term skilled physical therapy up to 5x/week     Potential barriers for safe discharge: pts current support system is unable to meet their requirements for physical assistance, pt has poor safety awareness, pt has impaired cognition, pt is a

## 2025-07-19 LAB
BACTERIA SPEC CULT: NORMAL
BACTERIA SPEC CULT: NORMAL
Lab: NORMAL
Lab: NORMAL

## 2025-07-19 RX ORDER — TAMSULOSIN HYDROCHLORIDE 0.4 MG/1
0.4 CAPSULE ORAL DAILY
Qty: 90 CAPSULE | Refills: 3 | Status: SHIPPED | OUTPATIENT
Start: 2025-07-19

## 2025-07-19 RX ORDER — LEVOTHYROXINE SODIUM 50 UG/1
50 TABLET ORAL DAILY
Qty: 90 TABLET | Refills: 3 | Status: SHIPPED | OUTPATIENT
Start: 2025-07-19 | End: 2025-08-18

## 2025-07-24 PROBLEM — I71.40 ABDOMINAL AORTIC ANEURYSM (AAA) WITHOUT RUPTURE: Status: ACTIVE | Noted: 2025-07-24

## 2025-08-04 ENCOUNTER — TRANSCRIBE ORDERS (OUTPATIENT)
Facility: HOSPITAL | Age: 75
End: 2025-08-04

## 2025-08-04 DIAGNOSIS — F17.210 CIGARETTE SMOKER: ICD-10-CM

## 2025-08-04 DIAGNOSIS — Z87.891 PERSONAL HISTORY OF TOBACCO USE, PRESENTING HAZARDS TO HEALTH: Primary | ICD-10-CM

## 2025-08-07 ENCOUNTER — HOSPITAL ENCOUNTER (OUTPATIENT)
Facility: HOSPITAL | Age: 75
Discharge: HOME | End: 2025-08-10
Attending: INTERNAL MEDICINE
Payer: MEDICARE

## 2025-08-07 DIAGNOSIS — Z87.891 PERSONAL HISTORY OF TOBACCO USE, PRESENTING HAZARDS TO HEALTH: ICD-10-CM

## 2025-08-07 DIAGNOSIS — F17.210 CIGARETTE SMOKER: ICD-10-CM

## 2025-08-07 PROCEDURE — 71271 CT THORAX LUNG CANCER SCR C-: CPT

## (undated) DEVICE — INTENDED FOR TISSUE SEPARATION, AND OTHER PROCEDURES THAT REQUIRE A SHARP SURGICAL BLADE TO PUNCTURE OR CUT.: Brand: BARD-PARKER ® CARBON RIB-BACK BLADES

## (undated) DEVICE — 3M™ RANGER™ FLUID WARMING IRRIGATION SET, 24750, 10/CASE: Brand: 3M™ RANGER™

## (undated) DEVICE — SET IRRIG TB DISP FOR BONESCALPEL

## (undated) DEVICE — PREP SKN DURAPREP 26ML APPL --

## (undated) DEVICE — TURNOVER KIT A GEN SURG

## (undated) DEVICE — STERILE POLYISOPRENE POWDER-FREE SURGICAL GLOVES: Brand: PROTEXIS

## (undated) DEVICE — TIP SUCTION FLANGE YANKAUER FLX NO VENT FN CAP STRL

## (undated) DEVICE — 3M™ IOBAN™ 2 ANTIMICROBIAL INCISE DRAPE 6650EZ: Brand: IOBAN™ 2

## (undated) DEVICE — SUT VCRL + 1 27IN OS6 VIO --

## (undated) DEVICE — SOL INJ SOD CL 0.9% 1000ML BG --

## (undated) DEVICE — STYLET SURG VIPER PRIM

## (undated) DEVICE — GAUZE,SPONGE,4"X4",16PLY,STRL,LF,10/TRAY: Brand: MEDLINE

## (undated) DEVICE — CHS NEURO PLUS 1: Brand: MEDLINE INDUSTRIES, INC.

## (undated) DEVICE — CORD BPLR 12FT SGL USE CLR

## (undated) DEVICE — SOLUTION IRRIG 1000ML H2O PIC PLAS SHATTERPROOF CONTAINER

## (undated) DEVICE — TIBURON UNIVERSAL SPINE DRAPE: Brand: CONVERTORS

## (undated) DEVICE — HEX-LOCKING BLADE ELECTRODE: Brand: EDGE

## (undated) DEVICE — TUBING, SUCTION, 1/4" X 12', STRAIGHT: Brand: MEDLINE

## (undated) DEVICE — DRAPE,REIN 53X77,STERILE: Brand: MEDLINE

## (undated) DEVICE — INTENDED FOR TISSUE SEPARATION, AND OTHER PROCEDURES THAT REQUIRE A SHARP SURGICAL BLADE TO PUNCTURE OR CUT.: Brand: BARD-PARKER SAFETY BLADES SIZE 15, STERILE

## (undated) DEVICE — INTENDED FOR TISSUE SEPARATION, AND OTHER PROCEDURES THAT REQUIRE A SHARP SURGICAL BLADE TO PUNCTURE OR CUT.: Brand: BARD-PARKER SAFETY BLADES SIZE 10, STERILE

## (undated) DEVICE — SKN PREP SPNG STKS PVP PNT STR: Brand: MEDLINE INDUSTRIES, INC.

## (undated) DEVICE — BASIC SINGLE BASIN-LF: Brand: MEDLINE INDUSTRIES, INC.

## (undated) DEVICE — COVER LT HNDL BLU PLAS

## (undated) DEVICE — SOL IRR SOD CHL 0.9% TITAN XL CNTNR 3000ML

## (undated) DEVICE — TURNOVER KIT OR CUST CMB FLD GEN LF

## (undated) DEVICE — REM POLYHESIVE ADULT PATIENT RETURN ELECTRODE: Brand: VALLEYLAB

## (undated) DEVICE — SUTURE VCRL + SZ 2-0 L27IN ABSRB UD CP-1 1/2 CIR REV CUT VCP266H

## (undated) DEVICE — SEALANT HEMOSTAT W/THROM 8ML -- SURGIFLO MATRIX

## (undated) DEVICE — SYSTEM SKIN CLSR 22CM DERMBND PRINEO

## (undated) DEVICE — INSERT CUSH HDRST PRONE AD LG --

## (undated) DEVICE — NEURO SPONGES: Brand: DEROYAL

## (undated) DEVICE — SOLUTION IV 500ML 0.9% SOD BOTTLE CHL LTWT DURABLE SHATTERPROOF

## (undated) DEVICE — SOL IRR SOD CL 0.9% 1000ML BTL --

## (undated) DEVICE — BLADE ELECTRODE: Brand: EDGE

## (undated) DEVICE — SPONGE LAP PREWASHED 4X18 IN X RAY DETECTABLE SURG COUNT

## (undated) DEVICE — DRAPE SURG TOWEL SM 18X12 IN INVISISHIELD MP W/ ADH PLAS NS

## (undated) DEVICE — DRAPE C-ARMOUR C-ARM KIT --

## (undated) DEVICE — GOWN,PREVENTION PLUS,XLN/XL,ST,24/CS: Brand: MEDLINE

## (undated) DEVICE — DRAPE, HEAVY DUTY, STERILE. FOR A 6' BIG CASE BACK TABLE MODEL 429: Brand: OR SPECIFIC

## (undated) DEVICE — HEAD FRAME WITH SOFT LAYER FOAM POSITIONER: Brand: CARDINAL HEALTH

## (undated) DEVICE — GARMENT CMPR STD UNV CALF FLWT -- RN VENTILATE NS DISP 17- IN

## (undated) DEVICE — BLADE ULTRASONIC L20MM BLNT W/ SIL SL SHT EXTN DISP FOR HRD

## (undated) DEVICE — CYSTO-SFMC: Brand: MEDLINE INDUSTRIES, INC.

## (undated) DEVICE — GLOVE ORANGE PI 7 1/2   MSG9075

## (undated) DEVICE — TRAY URIN CATH PED 16FR BLLN 5CC INDWL STR TIP INF CTRL

## (undated) DEVICE — SYRINGE,PISTON,IRRIGATION,60ML,STERILE: Brand: MEDLINE

## (undated) DEVICE — MAGNETIC INSTRUMENT PAD 16" X 20"; LARGE; DISPOSABLE: Brand: CARDINAL HEALTH

## (undated) DEVICE — CATHETER URETH 22FR BLLN 30CC SIL BACT GRD HYDRGEL 3 W

## (undated) DEVICE — DRAPE EQUIP C ARM 74X42 IN MOB XR W/ TIE RUBBER BND LF

## (undated) DEVICE — 3M™ STERI-STRIP™ REINFORCED ADHESIVE SKIN CLOSURES, R1547, 1/2 IN X 4 IN (12 MM X 100 MM), 6 STRIPS/ENVELOPE: Brand: 3M™ STERI-STRIP™

## (undated) DEVICE — CONTAINER,SPECIMEN,PNEU TUBE,4OZ,OR STRL: Brand: MEDLINE

## (undated) DEVICE — 4-PORT MANIFOLD: Brand: NEPTUNE 2